# Patient Record
Sex: FEMALE | Race: WHITE | NOT HISPANIC OR LATINO | URBAN - METROPOLITAN AREA
[De-identification: names, ages, dates, MRNs, and addresses within clinical notes are randomized per-mention and may not be internally consistent; named-entity substitution may affect disease eponyms.]

---

## 2015-12-30 RX ORDER — PANTOPRAZOLE SODIUM 20 MG/1
1 TABLET, DELAYED RELEASE ORAL
Qty: 0 | Refills: 0 | COMMUNITY
Start: 2015-12-30

## 2017-05-12 ENCOUNTER — INPATIENT (INPATIENT)
Facility: HOSPITAL | Age: 82
LOS: 10 days | Discharge: NURSING, REHAB OR LONG-TERM CARE | End: 2017-05-23
Attending: INTERNAL MEDICINE | Admitting: INTERNAL MEDICINE
Payer: MEDICARE

## 2017-05-12 VITALS
OXYGEN SATURATION: 100 % | RESPIRATION RATE: 16 BRPM | SYSTOLIC BLOOD PRESSURE: 194 MMHG | HEART RATE: 90 BPM | DIASTOLIC BLOOD PRESSURE: 52 MMHG

## 2017-05-12 DIAGNOSIS — I73.9 PERIPHERAL VASCULAR DISEASE, UNSPECIFIED: ICD-10-CM

## 2017-05-12 DIAGNOSIS — K25.9 GASTRIC ULCER, UNSPECIFIED AS ACUTE OR CHRONIC, WITHOUT HEMORRHAGE OR PERFORATION: ICD-10-CM

## 2017-05-12 DIAGNOSIS — D64.9 ANEMIA, UNSPECIFIED: ICD-10-CM

## 2017-05-12 DIAGNOSIS — Z29.9 ENCOUNTER FOR PROPHYLACTIC MEASURES, UNSPECIFIED: ICD-10-CM

## 2017-05-12 DIAGNOSIS — I10 ESSENTIAL (PRIMARY) HYPERTENSION: ICD-10-CM

## 2017-05-12 DIAGNOSIS — Z98.89 OTHER SPECIFIED POSTPROCEDURAL STATES: Chronic | ICD-10-CM

## 2017-05-12 DIAGNOSIS — G45.9 TRANSIENT CEREBRAL ISCHEMIC ATTACK, UNSPECIFIED: ICD-10-CM

## 2017-05-12 LAB
ALBUMIN SERPL ELPH-MCNC: 4.5 G/DL — SIGNIFICANT CHANGE UP (ref 3.3–5)
ALP SERPL-CCNC: 143 U/L — HIGH (ref 40–120)
ALT FLD-CCNC: 18 U/L — SIGNIFICANT CHANGE UP (ref 4–33)
AST SERPL-CCNC: 23 U/L — SIGNIFICANT CHANGE UP (ref 4–32)
BASE EXCESS BLDV CALC-SCNC: -5.4 MMOL/L — SIGNIFICANT CHANGE UP
BASOPHILS # BLD AUTO: 0.02 K/UL — SIGNIFICANT CHANGE UP (ref 0–0.2)
BASOPHILS NFR BLD AUTO: 0.3 % — SIGNIFICANT CHANGE UP (ref 0–2)
BILIRUB SERPL-MCNC: 0.3 MG/DL — SIGNIFICANT CHANGE UP (ref 0.2–1.2)
BLD GP AB SCN SERPL QL: POSITIVE — SIGNIFICANT CHANGE UP
BLD GP AB SCN SERPL QL: POSITIVE — SIGNIFICANT CHANGE UP
BLOOD GAS VENOUS - CREATININE: 1.31 MG/DL — HIGH (ref 0.5–1.3)
BUN SERPL-MCNC: 27 MG/DL — HIGH (ref 7–23)
CALCIUM SERPL-MCNC: 9.6 MG/DL — SIGNIFICANT CHANGE UP (ref 8.4–10.5)
CHLORIDE BLDV-SCNC: 109 MMOL/L — HIGH (ref 96–108)
CHLORIDE SERPL-SCNC: 103 MMOL/L — SIGNIFICANT CHANGE UP (ref 98–107)
CO2 SERPL-SCNC: 17 MMOL/L — LOW (ref 22–31)
CREAT SERPL-MCNC: 1.38 MG/DL — HIGH (ref 0.5–1.3)
DAT POLY-SP REAG RBC QL: NEGATIVE — SIGNIFICANT CHANGE UP
EOSINOPHIL # BLD AUTO: 0 K/UL — SIGNIFICANT CHANGE UP (ref 0–0.5)
EOSINOPHIL NFR BLD AUTO: 0 % — SIGNIFICANT CHANGE UP (ref 0–6)
FERRITIN SERPL-MCNC: 8.91 NG/ML — LOW (ref 15–150)
GAS PNL BLDV: 136 MMOL/L — SIGNIFICANT CHANGE UP (ref 136–146)
GLUCOSE BLDV-MCNC: 156 — HIGH (ref 70–99)
GLUCOSE SERPL-MCNC: 153 MG/DL — HIGH (ref 70–99)
HAPTOGLOB SERPL-MCNC: 200 MG/DL — SIGNIFICANT CHANGE UP (ref 34–200)
HCO3 BLDV-SCNC: 19 MMOL/L — LOW (ref 20–27)
HCT VFR BLD CALC: 19.5 % — CRITICAL LOW (ref 34.5–45)
HCT VFR BLDV CALC: 17.7 % — CRITICAL LOW (ref 34.5–45)
HGB BLD-MCNC: 5.5 G/DL — CRITICAL LOW (ref 11.5–15.5)
HGB BLDV-MCNC: 5.6 G/DL — CRITICAL LOW (ref 11.5–15.5)
IMM GRANULOCYTES NFR BLD AUTO: 0.3 % — SIGNIFICANT CHANGE UP (ref 0–1.5)
IRON SATN MFR SERPL: 12 UG/DL — LOW (ref 30–160)
IRON SATN MFR SERPL: 534 UG/DL — HIGH (ref 140–530)
LACTATE BLDV-MCNC: 3.6 MMOL/L — HIGH (ref 0.5–2)
LDH SERPL L TO P-CCNC: 227 U/L — HIGH (ref 135–225)
LYMPHOCYTES # BLD AUTO: 0.69 K/UL — LOW (ref 1–3.3)
LYMPHOCYTES # BLD AUTO: 11.4 % — LOW (ref 13–44)
MCHC RBC-ENTMCNC: 23.1 PG — LOW (ref 27–34)
MCHC RBC-ENTMCNC: 28.2 % — LOW (ref 32–36)
MCV RBC AUTO: 81.9 FL — SIGNIFICANT CHANGE UP (ref 80–100)
MONOCYTES # BLD AUTO: 0.19 K/UL — SIGNIFICANT CHANGE UP (ref 0–0.9)
MONOCYTES NFR BLD AUTO: 3.1 % — SIGNIFICANT CHANGE UP (ref 2–14)
NEUTROPHILS # BLD AUTO: 5.15 K/UL — SIGNIFICANT CHANGE UP (ref 1.8–7.4)
NEUTROPHILS NFR BLD AUTO: 84.9 % — HIGH (ref 43–77)
OB PNL STL: NEGATIVE — SIGNIFICANT CHANGE UP
PCO2 BLDV: 39 MMHG — LOW (ref 41–51)
PH BLDV: 7.32 PH — SIGNIFICANT CHANGE UP (ref 7.32–7.43)
PLATELET # BLD AUTO: 307 K/UL — SIGNIFICANT CHANGE UP (ref 150–400)
PMV BLD: 10.1 FL — SIGNIFICANT CHANGE UP (ref 7–13)
PO2 BLDV: < 24 MMHG — LOW (ref 35–40)
POTASSIUM BLDV-SCNC: 4.7 MMOL/L — HIGH (ref 3.4–4.5)
POTASSIUM SERPL-MCNC: 5 MMOL/L — SIGNIFICANT CHANGE UP (ref 3.5–5.3)
POTASSIUM SERPL-SCNC: 5 MMOL/L — SIGNIFICANT CHANGE UP (ref 3.5–5.3)
PROT SERPL-MCNC: 7.7 G/DL — SIGNIFICANT CHANGE UP (ref 6–8.3)
RBC # BLD: 2.38 M/UL — LOW (ref 3.8–5.2)
RBC # FLD: 18.8 % — HIGH (ref 10.3–14.5)
RETICS #: 71.9 10X3/UL — SIGNIFICANT CHANGE UP (ref 17–73)
RETICS/RBC NFR: 3 % — HIGH (ref 0.5–2.5)
RH IG SCN BLD-IMP: POSITIVE — SIGNIFICANT CHANGE UP
RH IG SCN BLD-IMP: POSITIVE — SIGNIFICANT CHANGE UP
SAO2 % BLDV: 21.7 % — LOW (ref 60–85)
SODIUM SERPL-SCNC: 140 MMOL/L — SIGNIFICANT CHANGE UP (ref 135–145)
UIBC SERPL-MCNC: 522 UG/DL — HIGH (ref 110–370)
WBC # BLD: 6.07 K/UL — SIGNIFICANT CHANGE UP (ref 3.8–10.5)
WBC # FLD AUTO: 6.07 K/UL — SIGNIFICANT CHANGE UP (ref 3.8–10.5)

## 2017-05-12 PROCEDURE — 74176 CT ABD & PELVIS W/O CONTRAST: CPT | Mod: 26

## 2017-05-12 PROCEDURE — 86077 PHYS BLOOD BANK SERV XMATCH: CPT

## 2017-05-12 RX ORDER — ATORVASTATIN CALCIUM 80 MG/1
80 TABLET, FILM COATED ORAL AT BEDTIME
Qty: 0 | Refills: 0 | Status: DISCONTINUED | OUTPATIENT
Start: 2017-05-12 | End: 2017-05-23

## 2017-05-12 RX ORDER — ISOSORBIDE MONONITRATE 60 MG/1
60 TABLET, EXTENDED RELEASE ORAL DAILY
Qty: 0 | Refills: 0 | Status: DISCONTINUED | OUTPATIENT
Start: 2017-05-12 | End: 2017-05-23

## 2017-05-12 RX ORDER — PANTOPRAZOLE SODIUM 20 MG/1
40 TABLET, DELAYED RELEASE ORAL EVERY 12 HOURS
Qty: 0 | Refills: 0 | Status: DISCONTINUED | OUTPATIENT
Start: 2017-05-12 | End: 2017-05-18

## 2017-05-12 RX ORDER — GABAPENTIN 400 MG/1
300 CAPSULE ORAL DAILY
Qty: 0 | Refills: 0 | Status: DISCONTINUED | OUTPATIENT
Start: 2017-05-12 | End: 2017-05-23

## 2017-05-12 RX ORDER — DOXAZOSIN MESYLATE 4 MG
1 TABLET ORAL AT BEDTIME
Qty: 0 | Refills: 0 | Status: DISCONTINUED | OUTPATIENT
Start: 2017-05-12 | End: 2017-05-23

## 2017-05-12 RX ADMIN — ATORVASTATIN CALCIUM 80 MILLIGRAM(S): 80 TABLET, FILM COATED ORAL at 22:26

## 2017-05-12 RX ADMIN — Medication 1 MILLIGRAM(S): at 22:47

## 2017-05-12 NOTE — H&P ADULT. - PROBLEM SELECTOR PLAN 3
-hypertensive in ED; will restart home Cardura 1mg, Imdur 60mg  -holding Lasix 40mg and Losartan in setting of slight CELI

## 2017-05-12 NOTE — ED PROVIDER NOTE - OBJECTIVE STATEMENT
85F PMH HTN, PVD, arthritis, GIB in the past, gastric ulcer sent from urgent care for low H/H. Pt reports seen at urgent care yesterday for b/l LE pain (chronic, currently pain free and ambulatory, worked up and determined to be musculoskeletal in origin). Had bloodwork done and today received a phone call saying to go to the hospital for low hemoglobin. Unsure what value was. Denies dark stools but endorses hx of maroon stools in the past with GI bleed. No abd pain, NVD, fever/chills. Endorses fatigue, but no chest pain or SOB, no WELCH.     GI: Dr. Shelton Connolly

## 2017-05-12 NOTE — H&P ADULT. - NEGATIVE GASTROINTESTINAL SYMPTOMS
no hematochezia/+melena 2 weeks prior to admission/no vomiting/no jaundice/no diarrhea/no constipation/no nausea/no abdominal pain

## 2017-05-12 NOTE — H&P ADULT. - RS GEN PE MLT RESP DETAILS PC
no wheezes/airway patent/no intercostal retractions/good air movement/clear to auscultation bilaterally/no rhonchi/no chest wall tenderness/no rales/breath sounds equal/respirations non-labored/normal

## 2017-05-12 NOTE — PATIENT PROFILE ADULT. - NS TRANSFER PATIENT BELONGINGS
Jewelry/Money (specify)/wedding ring Clothing/cosmetic supplies/Other belongings/Cell Phone/PDA (specify)

## 2017-05-12 NOTE — ED PROVIDER NOTE - PSH
History of operative procedure on hip  Right hip fracture s/p fall on 03/16.  S/P tonsillectomy    Varicose veins  stripping

## 2017-05-12 NOTE — H&P ADULT. - ATTENDING COMMENTS
Chart reviewed. Vitals and Labs noted.   Pt seen and examined at bedside. Plan formulated with the resident/PA/NP. Detail H&P as above.   hx of gastric ulcers, PUD, hx of CVA/CAD on Asa/Plavix. here for 3 days of melena.   Hgb of 5.5. Iron deficient as well likely from chronic loss.   S/p 2 unit pRBC transfusion with appropriate Hgb response. Pt feels well.  Will supplement iron via IV.   GI consult. c/w PPI IV BID, clear liquids diet.  Already have recent NM stress test and TTE in March 2017. All normal other than mild LVH. Hold Asa/Plavix for now.   Await GI eval Chart reviewed. Vitals and Labs noted.   Pt seen and examined at bedside. Plan formulated with the resident/PA/NP. Detail H&P as above.   hx of gastric ulcers, PUD, hx of CVA/CAD on Asa/Plavix. here for 3 days of melena.   Hgb of 5.5. Iron deficient as well likely from chronic loss.   S/p 2 unit pRBC transfusion with appropriate Hgb response. Pt feels well.  Will supplement iron via IV. Recheck Vitamin B12. Was low at 200 back in 2015.   GI consult. c/w PPI IV BID, clear liquids diet.  Already have recent NM stress test and TTE in March 2017. All normal other than mild LVH. Hold Asa/Plavix for now.   Await GI eval

## 2017-05-12 NOTE — ED ADULT NURSE NOTE - CHIEF COMPLAINT QUOTE
Pt sent from [facility]  with low H/H.  Pt seen for leg pain.  Denies chest pain, sob, dizziness

## 2017-05-12 NOTE — H&P ADULT. - NEGATIVE ENMT SYMPTOMS
no ear pain/no nasal congestion/no nose bleeds/no dysphagia/no throat pain/no nasal obstruction/no tinnitus/no hearing difficulty/no vertigo

## 2017-05-12 NOTE — H&P ADULT. - NEGATIVE NEUROLOGICAL SYMPTOMS
no transient paralysis/no vertigo/no paresthesias/no generalized seizures/no focal seizures/no tremors/no weakness

## 2017-05-12 NOTE — ED PROVIDER NOTE - PROGRESS NOTE DETAILS
Hemoglobin 5.5. Consented for blood. 2U PRBC ordered. Will admit given severity of anemia and lack of source. Pt's PMD is Dr. Rasmussen ([facility]); [facility] hospitalist paged x 1. Accepted for admission to [facility]. MAR textpage sent. Pt and family aware and amenable to plan.

## 2017-05-12 NOTE — ED ADULT TRIAGE NOTE - CHIEF COMPLAINT QUOTE
Pt sent from [facility]  with low H/H.  Pt seen for leg pain.  Denies chest pain, sob, dizziness

## 2017-05-12 NOTE — ED PROVIDER NOTE - MEDICAL DECISION MAKING DETAILS
85F with anemia of unclear origin. Denies any acute obvious source of bleeding. Assess cbc, cmp, type and screen, coags, occult blood. Transfuse if indiciated (Hb <7) 85F with anemia of unclear origin. Denies any acute obvious source of bleeding. Assess cbc, cmp, type and screen, coags, occult blood. Transfuse if indiciated (Hb <7)  Tamiko: sent for low blood count had been in outpt care for chronic LE pain.  Now with brown stool now.

## 2017-05-12 NOTE — H&P ADULT. - ASSESSMENT
86 y/o F PMHx PUD c/b GIB 12/2015 -last EGD 1/2016, PVD, CVA (no deficits) on DAPT, arthritis, HTN presents from home after receiving call from Urgent Care facility for asymptomatic anemia Hb 5.5, receiving 2u pRBC in ED, hemodynamically stable at this time, will transfer to medicine floors for continued management and evaluation.

## 2017-05-12 NOTE — ED ADULT NURSE NOTE - OBJECTIVE STATEMENT
Patient received into room 11 AA&Ox3 sent from PMD for abnormal lab value - low h/h. VSS on RA. Patient denies chest pain, N/V, SOB, fever, chills, dizziness/light-headedness at this time. Patient was seen at urgent care yesterday for chronic pain to b/l LE - currently no c/o pain. 20g PIV in place to left AC, labs drawn - will continue to monitor.

## 2017-05-12 NOTE — ED ADULT NURSE REASSESSMENT NOTE - NS ED NURSE REASSESS COMMENT FT1
Patient in NAD. VSS on RA. Patient denies chest pain, N/V, SOB, weakness at this time - will continue to monitor.

## 2017-05-12 NOTE — H&P ADULT. - PROBLEM SELECTOR PLAN 2
-history of PUD c/b GIB, last EGD 1/2016 showing ulcer  -will start Protonix 40mg BID  -Clear Liquid Diet  -GI consulted, likely EGD/Colonoscopy Monday 5/15, formal evaluation to follow

## 2017-05-12 NOTE — H&P ADULT. - NEGATIVE CARDIOVASCULAR SYMPTOMS
no chest pain/no palpitations/no dyspnea on exertion/no paroxysmal nocturnal dyspnea/no orthopnea/no peripheral edema

## 2017-05-12 NOTE — ED PROVIDER NOTE - GASTROINTESTINAL NEGATIVE STATEMENT, MLM
no abdominal pain, no bloating, no constipation, no diarrhea, no nausea and no vomiting. No dark stools

## 2017-05-12 NOTE — ED ADULT NURSE NOTE - PMH
Acute stomach ulcer    Gastric ulcer, unspecified as acute or chronic, without hemorrhage or perforation    GIB (gastrointestinal bleeding)    HTN (hypertension)    PVD (peripheral vascular disease)    TIA (transient ischemic attack)

## 2017-05-12 NOTE — H&P ADULT. - PROBLEM SELECTOR PLAN 1
-Hb 5.5 in ED, unclear etiology, but given melena 2 weeks prior to admission and fatigue since, likely source chronic GIB  -GI consulted  -Will transfuse 2u pRBC now, check CBC post-transfusion and q8 hours, will transfuse to goal Hb 7.0  -Iron studies ordered  -IV PPI BID  -CLD

## 2017-05-12 NOTE — ED PROVIDER NOTE - ATTENDING CONTRIBUTION TO CARE
I performed a history and physical exam of the patient and discussed their management with the resident. I reviewed the resident's note and agree with the documented findings and plan of care. My medical decision making and observations are found above.  And soft, brown stool.

## 2017-05-13 LAB
ALBUMIN SERPL ELPH-MCNC: 4 G/DL — SIGNIFICANT CHANGE UP (ref 3.3–5)
ALP SERPL-CCNC: 119 U/L — SIGNIFICANT CHANGE UP (ref 40–120)
ALT FLD-CCNC: 17 U/L — SIGNIFICANT CHANGE UP (ref 4–33)
ANTIBODY ID 1_1: SIGNIFICANT CHANGE UP
APTT BLD: 26.4 SEC — LOW (ref 27.5–37.4)
AST SERPL-CCNC: 20 U/L — SIGNIFICANT CHANGE UP (ref 4–32)
BASOPHILS # BLD AUTO: 0.03 K/UL — SIGNIFICANT CHANGE UP (ref 0–0.2)
BASOPHILS NFR BLD AUTO: 0.5 % — SIGNIFICANT CHANGE UP (ref 0–2)
BILIRUB SERPL-MCNC: 0.6 MG/DL — SIGNIFICANT CHANGE UP (ref 0.2–1.2)
BUN SERPL-MCNC: 26 MG/DL — HIGH (ref 7–23)
CALCIUM SERPL-MCNC: 9.5 MG/DL — SIGNIFICANT CHANGE UP (ref 8.4–10.5)
CHLORIDE SERPL-SCNC: 105 MMOL/L — SIGNIFICANT CHANGE UP (ref 98–107)
CO2 SERPL-SCNC: 20 MMOL/L — LOW (ref 22–31)
CREAT SERPL-MCNC: 1.18 MG/DL — SIGNIFICANT CHANGE UP (ref 0.5–1.3)
EOSINOPHIL # BLD AUTO: 0.02 K/UL — SIGNIFICANT CHANGE UP (ref 0–0.5)
EOSINOPHIL NFR BLD AUTO: 0.3 % — SIGNIFICANT CHANGE UP (ref 0–6)
GLUCOSE SERPL-MCNC: 103 MG/DL — HIGH (ref 70–99)
HCT VFR BLD CALC: 24.9 % — LOW (ref 34.5–45)
HCT VFR BLD CALC: 25.7 % — LOW (ref 34.5–45)
HCT VFR BLD CALC: 25.9 % — LOW (ref 34.5–45)
HGB BLD-MCNC: 7.5 G/DL — LOW (ref 11.5–15.5)
HGB BLD-MCNC: 7.8 G/DL — LOW (ref 11.5–15.5)
HGB BLD-MCNC: 8.1 G/DL — LOW (ref 11.5–15.5)
IMM GRANULOCYTES NFR BLD AUTO: 0.2 % — SIGNIFICANT CHANGE UP (ref 0–1.5)
INR BLD: 1.05 — SIGNIFICANT CHANGE UP (ref 0.88–1.17)
LACTATE SERPL-SCNC: 2.9 MMOL/L — HIGH (ref 0.5–2)
LYMPHOCYTES # BLD AUTO: 0.99 K/UL — LOW (ref 1–3.3)
LYMPHOCYTES # BLD AUTO: 16.3 % — SIGNIFICANT CHANGE UP (ref 13–44)
MAGNESIUM SERPL-MCNC: 2.3 MG/DL — SIGNIFICANT CHANGE UP (ref 1.6–2.6)
MCHC RBC-ENTMCNC: 24.6 PG — LOW (ref 27–34)
MCHC RBC-ENTMCNC: 24.8 PG — LOW (ref 27–34)
MCHC RBC-ENTMCNC: 25.3 PG — LOW (ref 27–34)
MCHC RBC-ENTMCNC: 30.1 % — LOW (ref 32–36)
MCHC RBC-ENTMCNC: 30.4 % — LOW (ref 32–36)
MCHC RBC-ENTMCNC: 31.3 % — LOW (ref 32–36)
MCV RBC AUTO: 80.9 FL — SIGNIFICANT CHANGE UP (ref 80–100)
MCV RBC AUTO: 81.6 FL — SIGNIFICANT CHANGE UP (ref 80–100)
MCV RBC AUTO: 81.8 FL — SIGNIFICANT CHANGE UP (ref 80–100)
MONOCYTES # BLD AUTO: 0.47 K/UL — SIGNIFICANT CHANGE UP (ref 0–0.9)
MONOCYTES NFR BLD AUTO: 7.7 % — SIGNIFICANT CHANGE UP (ref 2–14)
NEUTROPHILS # BLD AUTO: 4.55 K/UL — SIGNIFICANT CHANGE UP (ref 1.8–7.4)
NEUTROPHILS NFR BLD AUTO: 75 % — SIGNIFICANT CHANGE UP (ref 43–77)
PHOSPHATE SERPL-MCNC: 2.7 MG/DL — SIGNIFICANT CHANGE UP (ref 2.5–4.5)
PLATELET # BLD AUTO: 242 K/UL — SIGNIFICANT CHANGE UP (ref 150–400)
PLATELET # BLD AUTO: 248 K/UL — SIGNIFICANT CHANGE UP (ref 150–400)
PLATELET # BLD AUTO: 252 K/UL — SIGNIFICANT CHANGE UP (ref 150–400)
PMV BLD: 9.5 FL — SIGNIFICANT CHANGE UP (ref 7–13)
PMV BLD: 9.7 FL — SIGNIFICANT CHANGE UP (ref 7–13)
PMV BLD: 9.9 FL — SIGNIFICANT CHANGE UP (ref 7–13)
POTASSIUM SERPL-MCNC: 4.1 MMOL/L — SIGNIFICANT CHANGE UP (ref 3.5–5.3)
POTASSIUM SERPL-SCNC: 4.1 MMOL/L — SIGNIFICANT CHANGE UP (ref 3.5–5.3)
PROT SERPL-MCNC: 7 G/DL — SIGNIFICANT CHANGE UP (ref 6–8.3)
PROTHROM AB SERPL-ACNC: 11.8 SEC — SIGNIFICANT CHANGE UP (ref 9.8–13.1)
RBC # BLD: 3.05 M/UL — LOW (ref 3.8–5.2)
RBC # BLD: 3.14 M/UL — LOW (ref 3.8–5.2)
RBC # BLD: 3.2 M/UL — LOW (ref 3.8–5.2)
RBC # FLD: 16.7 % — HIGH (ref 10.3–14.5)
RBC # FLD: 16.8 % — HIGH (ref 10.3–14.5)
RBC # FLD: 17 % — HIGH (ref 10.3–14.5)
SODIUM SERPL-SCNC: 142 MMOL/L — SIGNIFICANT CHANGE UP (ref 135–145)
WBC # BLD: 6.07 K/UL — SIGNIFICANT CHANGE UP (ref 3.8–10.5)
WBC # BLD: 6.82 K/UL — SIGNIFICANT CHANGE UP (ref 3.8–10.5)
WBC # BLD: 8.46 K/UL — SIGNIFICANT CHANGE UP (ref 3.8–10.5)
WBC # FLD AUTO: 6.07 K/UL — SIGNIFICANT CHANGE UP (ref 3.8–10.5)
WBC # FLD AUTO: 6.82 K/UL — SIGNIFICANT CHANGE UP (ref 3.8–10.5)
WBC # FLD AUTO: 8.46 K/UL — SIGNIFICANT CHANGE UP (ref 3.8–10.5)

## 2017-05-13 RX ORDER — LABETALOL HCL 100 MG
10 TABLET ORAL ONCE
Qty: 0 | Refills: 0 | Status: COMPLETED | OUTPATIENT
Start: 2017-05-13 | End: 2017-05-13

## 2017-05-13 RX ORDER — PREGABALIN 225 MG/1
1000 CAPSULE ORAL DAILY
Qty: 0 | Refills: 0 | Status: DISCONTINUED | OUTPATIENT
Start: 2017-05-13 | End: 2017-05-16

## 2017-05-13 RX ORDER — LOSARTAN POTASSIUM 100 MG/1
100 TABLET, FILM COATED ORAL DAILY
Qty: 0 | Refills: 0 | Status: DISCONTINUED | OUTPATIENT
Start: 2017-05-13 | End: 2017-05-23

## 2017-05-13 RX ORDER — FUROSEMIDE 40 MG
40 TABLET ORAL DAILY
Qty: 0 | Refills: 0 | Status: DISCONTINUED | OUTPATIENT
Start: 2017-05-13 | End: 2017-05-13

## 2017-05-13 RX ORDER — IRON SUCROSE 20 MG/ML
200 INJECTION, SOLUTION INTRAVENOUS ONCE
Qty: 0 | Refills: 0 | Status: COMPLETED | OUTPATIENT
Start: 2017-05-13 | End: 2017-05-13

## 2017-05-13 RX ORDER — FUROSEMIDE 40 MG
40 TABLET ORAL DAILY
Qty: 0 | Refills: 0 | Status: DISCONTINUED | OUTPATIENT
Start: 2017-05-13 | End: 2017-05-23

## 2017-05-13 RX ADMIN — IRON SUCROSE 110 MILLIGRAM(S): 20 INJECTION, SOLUTION INTRAVENOUS at 16:22

## 2017-05-13 RX ADMIN — Medication 10 MILLIGRAM(S): at 06:49

## 2017-05-13 RX ADMIN — Medication 1 MILLIGRAM(S): at 21:32

## 2017-05-13 RX ADMIN — PANTOPRAZOLE SODIUM 40 MILLIGRAM(S): 20 TABLET, DELAYED RELEASE ORAL at 05:06

## 2017-05-13 RX ADMIN — ISOSORBIDE MONONITRATE 60 MILLIGRAM(S): 60 TABLET, EXTENDED RELEASE ORAL at 13:22

## 2017-05-13 RX ADMIN — GABAPENTIN 300 MILLIGRAM(S): 400 CAPSULE ORAL at 13:22

## 2017-05-13 RX ADMIN — PANTOPRAZOLE SODIUM 40 MILLIGRAM(S): 20 TABLET, DELAYED RELEASE ORAL at 17:34

## 2017-05-13 RX ADMIN — PREGABALIN 1000 MICROGRAM(S): 225 CAPSULE ORAL at 13:22

## 2017-05-13 RX ADMIN — ATORVASTATIN CALCIUM 80 MILLIGRAM(S): 80 TABLET, FILM COATED ORAL at 21:32

## 2017-05-13 RX ADMIN — LOSARTAN POTASSIUM 100 MILLIGRAM(S): 100 TABLET, FILM COATED ORAL at 13:22

## 2017-05-13 RX ADMIN — Medication 40 MILLIGRAM(S): at 19:07

## 2017-05-14 LAB
BLD GP AB SCN SERPL QL: POSITIVE — SIGNIFICANT CHANGE UP
BUN SERPL-MCNC: 22 MG/DL — SIGNIFICANT CHANGE UP (ref 7–23)
CALCIUM SERPL-MCNC: 9.3 MG/DL — SIGNIFICANT CHANGE UP (ref 8.4–10.5)
CHLORIDE SERPL-SCNC: 103 MMOL/L — SIGNIFICANT CHANGE UP (ref 98–107)
CO2 SERPL-SCNC: 21 MMOL/L — LOW (ref 22–31)
CREAT SERPL-MCNC: 1.18 MG/DL — SIGNIFICANT CHANGE UP (ref 0.5–1.3)
DAT C3-SP REAG RBC QL: NEGATIVE — SIGNIFICANT CHANGE UP
DAT POLY-SP REAG RBC QL: NEGATIVE — SIGNIFICANT CHANGE UP
DIRECT COOMBS IGG: NEGATIVE — SIGNIFICANT CHANGE UP
GLUCOSE SERPL-MCNC: 137 MG/DL — HIGH (ref 70–99)
HCT VFR BLD CALC: 28.3 % — LOW (ref 34.5–45)
HGB BLD-MCNC: 8.7 G/DL — LOW (ref 11.5–15.5)
LACTATE SERPL-SCNC: 1.9 MMOL/L — SIGNIFICANT CHANGE UP (ref 0.5–2)
MAGNESIUM SERPL-MCNC: 2.2 MG/DL — SIGNIFICANT CHANGE UP (ref 1.6–2.6)
MCHC RBC-ENTMCNC: 25.3 PG — LOW (ref 27–34)
MCHC RBC-ENTMCNC: 30.7 % — LOW (ref 32–36)
MCV RBC AUTO: 82.3 FL — SIGNIFICANT CHANGE UP (ref 80–100)
NRBC FLD-RTO: 1.5 — SIGNIFICANT CHANGE UP
PHOSPHATE SERPL-MCNC: 4 MG/DL — SIGNIFICANT CHANGE UP (ref 2.5–4.5)
PLATELET # BLD AUTO: 274 K/UL — SIGNIFICANT CHANGE UP (ref 150–400)
PMV BLD: 10.4 FL — SIGNIFICANT CHANGE UP (ref 7–13)
POTASSIUM SERPL-MCNC: 4.3 MMOL/L — SIGNIFICANT CHANGE UP (ref 3.5–5.3)
POTASSIUM SERPL-SCNC: 4.3 MMOL/L — SIGNIFICANT CHANGE UP (ref 3.5–5.3)
RBC # BLD: 3.44 M/UL — LOW (ref 3.8–5.2)
RBC # FLD: 17.4 % — HIGH (ref 10.3–14.5)
RH IG SCN BLD-IMP: POSITIVE — SIGNIFICANT CHANGE UP
SODIUM SERPL-SCNC: 142 MMOL/L — SIGNIFICANT CHANGE UP (ref 135–145)
VIT B12 SERPL-MCNC: 447 PG/ML — SIGNIFICANT CHANGE UP (ref 200–900)
WBC # BLD: 4.93 K/UL — SIGNIFICANT CHANGE UP (ref 3.8–10.5)
WBC # FLD AUTO: 4.93 K/UL — SIGNIFICANT CHANGE UP (ref 3.8–10.5)

## 2017-05-14 RX ORDER — SOD SULF/SODIUM/NAHCO3/KCL/PEG
1000 SOLUTION, RECONSTITUTED, ORAL ORAL EVERY 6 HOURS
Qty: 0 | Refills: 0 | Status: COMPLETED | OUTPATIENT
Start: 2017-05-14 | End: 2017-05-14

## 2017-05-14 RX ADMIN — Medication 1000 MILLILITER(S): at 17:53

## 2017-05-14 RX ADMIN — Medication 1 MILLIGRAM(S): at 21:13

## 2017-05-14 RX ADMIN — GABAPENTIN 300 MILLIGRAM(S): 400 CAPSULE ORAL at 11:30

## 2017-05-14 RX ADMIN — LOSARTAN POTASSIUM 100 MILLIGRAM(S): 100 TABLET, FILM COATED ORAL at 05:12

## 2017-05-14 RX ADMIN — Medication 40 MILLIGRAM(S): at 05:12

## 2017-05-14 RX ADMIN — PANTOPRAZOLE SODIUM 40 MILLIGRAM(S): 20 TABLET, DELAYED RELEASE ORAL at 05:12

## 2017-05-14 RX ADMIN — Medication 5 MILLIGRAM(S): at 17:54

## 2017-05-14 RX ADMIN — Medication 5 MILLIGRAM(S): at 23:03

## 2017-05-14 RX ADMIN — PREGABALIN 1000 MICROGRAM(S): 225 CAPSULE ORAL at 11:30

## 2017-05-14 RX ADMIN — ISOSORBIDE MONONITRATE 60 MILLIGRAM(S): 60 TABLET, EXTENDED RELEASE ORAL at 11:31

## 2017-05-14 RX ADMIN — Medication 1000 MILLILITER(S): at 23:03

## 2017-05-14 RX ADMIN — ATORVASTATIN CALCIUM 80 MILLIGRAM(S): 80 TABLET, FILM COATED ORAL at 21:13

## 2017-05-14 RX ADMIN — PANTOPRAZOLE SODIUM 40 MILLIGRAM(S): 20 TABLET, DELAYED RELEASE ORAL at 17:49

## 2017-05-15 LAB
BUN SERPL-MCNC: 21 MG/DL — SIGNIFICANT CHANGE UP (ref 7–23)
CALCIUM SERPL-MCNC: 9 MG/DL — SIGNIFICANT CHANGE UP (ref 8.4–10.5)
CHLORIDE SERPL-SCNC: 106 MMOL/L — SIGNIFICANT CHANGE UP (ref 98–107)
CO2 SERPL-SCNC: 18 MMOL/L — LOW (ref 22–31)
CREAT SERPL-MCNC: 1.23 MG/DL — SIGNIFICANT CHANGE UP (ref 0.5–1.3)
GLUCOSE SERPL-MCNC: 80 MG/DL — SIGNIFICANT CHANGE UP (ref 70–99)
HCT VFR BLD CALC: 26.1 % — LOW (ref 34.5–45)
HGB BLD-MCNC: 7.7 G/DL — LOW (ref 11.5–15.5)
MAGNESIUM SERPL-MCNC: 2 MG/DL — SIGNIFICANT CHANGE UP (ref 1.6–2.6)
MCHC RBC-ENTMCNC: 24.4 PG — LOW (ref 27–34)
MCHC RBC-ENTMCNC: 29.5 % — LOW (ref 32–36)
MCV RBC AUTO: 82.9 FL — SIGNIFICANT CHANGE UP (ref 80–100)
NRBC FLD-RTO: 1 — SIGNIFICANT CHANGE UP
PHOSPHATE SERPL-MCNC: 3.5 MG/DL — SIGNIFICANT CHANGE UP (ref 2.5–4.5)
PLATELET # BLD AUTO: 239 K/UL — SIGNIFICANT CHANGE UP (ref 150–400)
PMV BLD: 9.7 FL — SIGNIFICANT CHANGE UP (ref 7–13)
POTASSIUM SERPL-MCNC: 4 MMOL/L — SIGNIFICANT CHANGE UP (ref 3.5–5.3)
POTASSIUM SERPL-SCNC: 4 MMOL/L — SIGNIFICANT CHANGE UP (ref 3.5–5.3)
RBC # BLD: 3.15 M/UL — LOW (ref 3.8–5.2)
RBC # FLD: 17.6 % — HIGH (ref 10.3–14.5)
SODIUM SERPL-SCNC: 143 MMOL/L — SIGNIFICANT CHANGE UP (ref 135–145)
WBC # BLD: 4.76 K/UL — SIGNIFICANT CHANGE UP (ref 3.8–10.5)
WBC # FLD AUTO: 4.76 K/UL — SIGNIFICANT CHANGE UP (ref 3.8–10.5)

## 2017-05-15 PROCEDURE — 93306 TTE W/DOPPLER COMPLETE: CPT | Mod: 26

## 2017-05-15 PROCEDURE — 43235 EGD DIAGNOSTIC BRUSH WASH: CPT | Mod: GC

## 2017-05-15 PROCEDURE — 99232 SBSQ HOSP IP/OBS MODERATE 35: CPT | Mod: 25,GC

## 2017-05-15 PROCEDURE — 45378 DIAGNOSTIC COLONOSCOPY: CPT | Mod: GC

## 2017-05-15 RX ORDER — IRON SUCROSE 20 MG/ML
200 INJECTION, SOLUTION INTRAVENOUS ONCE
Qty: 0 | Refills: 0 | Status: COMPLETED | OUTPATIENT
Start: 2017-05-15 | End: 2017-05-15

## 2017-05-15 RX ADMIN — GABAPENTIN 300 MILLIGRAM(S): 400 CAPSULE ORAL at 12:03

## 2017-05-15 RX ADMIN — Medication 1 MILLIGRAM(S): at 21:05

## 2017-05-15 RX ADMIN — PANTOPRAZOLE SODIUM 40 MILLIGRAM(S): 20 TABLET, DELAYED RELEASE ORAL at 05:15

## 2017-05-15 RX ADMIN — LOSARTAN POTASSIUM 100 MILLIGRAM(S): 100 TABLET, FILM COATED ORAL at 05:15

## 2017-05-15 RX ADMIN — Medication 40 MILLIGRAM(S): at 05:15

## 2017-05-15 RX ADMIN — ISOSORBIDE MONONITRATE 60 MILLIGRAM(S): 60 TABLET, EXTENDED RELEASE ORAL at 12:02

## 2017-05-15 RX ADMIN — PREGABALIN 1000 MICROGRAM(S): 225 CAPSULE ORAL at 12:03

## 2017-05-15 RX ADMIN — IRON SUCROSE 110 MILLIGRAM(S): 20 INJECTION, SOLUTION INTRAVENOUS at 21:06

## 2017-05-15 RX ADMIN — ATORVASTATIN CALCIUM 80 MILLIGRAM(S): 80 TABLET, FILM COATED ORAL at 21:05

## 2017-05-16 LAB
BUN SERPL-MCNC: 21 MG/DL — SIGNIFICANT CHANGE UP (ref 7–23)
CALCIUM SERPL-MCNC: 9 MG/DL — SIGNIFICANT CHANGE UP (ref 8.4–10.5)
CHLORIDE SERPL-SCNC: 109 MMOL/L — HIGH (ref 98–107)
CO2 SERPL-SCNC: 21 MMOL/L — LOW (ref 22–31)
CREAT SERPL-MCNC: 1.1 MG/DL — SIGNIFICANT CHANGE UP (ref 0.5–1.3)
GLUCOSE SERPL-MCNC: 107 MG/DL — HIGH (ref 70–99)
HCT VFR BLD CALC: 26.8 % — LOW (ref 34.5–45)
HGB BLD-MCNC: 7.9 G/DL — LOW (ref 11.5–15.5)
MAGNESIUM SERPL-MCNC: 2 MG/DL — SIGNIFICANT CHANGE UP (ref 1.6–2.6)
MCHC RBC-ENTMCNC: 24.7 PG — LOW (ref 27–34)
MCHC RBC-ENTMCNC: 29.5 % — LOW (ref 32–36)
MCV RBC AUTO: 83.8 FL — SIGNIFICANT CHANGE UP (ref 80–100)
PHOSPHATE SERPL-MCNC: 3 MG/DL — SIGNIFICANT CHANGE UP (ref 2.5–4.5)
PLATELET # BLD AUTO: 258 K/UL — SIGNIFICANT CHANGE UP (ref 150–400)
PMV BLD: 10 FL — SIGNIFICANT CHANGE UP (ref 7–13)
POTASSIUM SERPL-MCNC: 3.8 MMOL/L — SIGNIFICANT CHANGE UP (ref 3.5–5.3)
POTASSIUM SERPL-SCNC: 3.8 MMOL/L — SIGNIFICANT CHANGE UP (ref 3.5–5.3)
RBC # BLD: 3.2 M/UL — LOW (ref 3.8–5.2)
RBC # FLD: 17.7 % — HIGH (ref 10.3–14.5)
SODIUM SERPL-SCNC: 146 MMOL/L — HIGH (ref 135–145)
WBC # BLD: 8.28 K/UL — SIGNIFICANT CHANGE UP (ref 3.8–10.5)
WBC # FLD AUTO: 8.28 K/UL — SIGNIFICANT CHANGE UP (ref 3.8–10.5)

## 2017-05-16 PROCEDURE — 93925 LOWER EXTREMITY STUDY: CPT | Mod: 26

## 2017-05-16 PROCEDURE — 99223 1ST HOSP IP/OBS HIGH 75: CPT

## 2017-05-16 PROCEDURE — 93923 UPR/LXTR ART STDY 3+ LVLS: CPT | Mod: 26

## 2017-05-16 RX ORDER — IBUPROFEN 200 MG
400 TABLET ORAL ONCE
Qty: 0 | Refills: 0 | Status: COMPLETED | OUTPATIENT
Start: 2017-05-16 | End: 2017-05-16

## 2017-05-16 RX ORDER — ACETAMINOPHEN 500 MG
650 TABLET ORAL ONCE
Qty: 0 | Refills: 0 | Status: COMPLETED | OUTPATIENT
Start: 2017-05-16 | End: 2017-05-16

## 2017-05-16 RX ORDER — ACETAMINOPHEN 500 MG
650 TABLET ORAL EVERY 6 HOURS
Qty: 0 | Refills: 0 | Status: DISCONTINUED | OUTPATIENT
Start: 2017-05-16 | End: 2017-05-23

## 2017-05-16 RX ADMIN — GABAPENTIN 300 MILLIGRAM(S): 400 CAPSULE ORAL at 13:24

## 2017-05-16 RX ADMIN — Medication 650 MILLIGRAM(S): at 19:20

## 2017-05-16 RX ADMIN — Medication 400 MILLIGRAM(S): at 19:20

## 2017-05-16 RX ADMIN — Medication 40 MILLIGRAM(S): at 06:07

## 2017-05-16 RX ADMIN — PANTOPRAZOLE SODIUM 40 MILLIGRAM(S): 20 TABLET, DELAYED RELEASE ORAL at 06:07

## 2017-05-16 RX ADMIN — ATORVASTATIN CALCIUM 80 MILLIGRAM(S): 80 TABLET, FILM COATED ORAL at 21:44

## 2017-05-16 RX ADMIN — Medication 650 MILLIGRAM(S): at 18:22

## 2017-05-16 RX ADMIN — ISOSORBIDE MONONITRATE 60 MILLIGRAM(S): 60 TABLET, EXTENDED RELEASE ORAL at 13:25

## 2017-05-16 RX ADMIN — LOSARTAN POTASSIUM 100 MILLIGRAM(S): 100 TABLET, FILM COATED ORAL at 06:07

## 2017-05-16 RX ADMIN — Medication 400 MILLIGRAM(S): at 18:21

## 2017-05-16 RX ADMIN — Medication 1 MILLIGRAM(S): at 21:43

## 2017-05-16 RX ADMIN — PANTOPRAZOLE SODIUM 40 MILLIGRAM(S): 20 TABLET, DELAYED RELEASE ORAL at 18:22

## 2017-05-17 LAB
BUN SERPL-MCNC: 22 MG/DL — SIGNIFICANT CHANGE UP (ref 7–23)
CALCIUM SERPL-MCNC: 9.1 MG/DL — SIGNIFICANT CHANGE UP (ref 8.4–10.5)
CHLORIDE SERPL-SCNC: 107 MMOL/L — SIGNIFICANT CHANGE UP (ref 98–107)
CO2 SERPL-SCNC: 21 MMOL/L — LOW (ref 22–31)
CREAT SERPL-MCNC: 1.22 MG/DL — SIGNIFICANT CHANGE UP (ref 0.5–1.3)
GLUCOSE SERPL-MCNC: 105 MG/DL — HIGH (ref 70–99)
HCT VFR BLD CALC: 28.4 % — LOW (ref 34.5–45)
HGB BLD-MCNC: 8.5 G/DL — LOW (ref 11.5–15.5)
MAGNESIUM SERPL-MCNC: 1.9 MG/DL — SIGNIFICANT CHANGE UP (ref 1.6–2.6)
MCHC RBC-ENTMCNC: 25.1 PG — LOW (ref 27–34)
MCHC RBC-ENTMCNC: 29.9 % — LOW (ref 32–36)
MCV RBC AUTO: 84 FL — SIGNIFICANT CHANGE UP (ref 80–100)
PHOSPHATE SERPL-MCNC: 3.7 MG/DL — SIGNIFICANT CHANGE UP (ref 2.5–4.5)
PLATELET # BLD AUTO: 228 K/UL — SIGNIFICANT CHANGE UP (ref 150–400)
PMV BLD: 10.3 FL — SIGNIFICANT CHANGE UP (ref 7–13)
POTASSIUM SERPL-MCNC: 3.5 MMOL/L — SIGNIFICANT CHANGE UP (ref 3.5–5.3)
POTASSIUM SERPL-SCNC: 3.5 MMOL/L — SIGNIFICANT CHANGE UP (ref 3.5–5.3)
RBC # BLD: 3.38 M/UL — LOW (ref 3.8–5.2)
RBC # FLD: 17.6 % — HIGH (ref 10.3–14.5)
SODIUM SERPL-SCNC: 144 MMOL/L — SIGNIFICANT CHANGE UP (ref 135–145)
WBC # BLD: 7.24 K/UL — SIGNIFICANT CHANGE UP (ref 3.8–10.5)
WBC # FLD AUTO: 7.24 K/UL — SIGNIFICANT CHANGE UP (ref 3.8–10.5)

## 2017-05-17 PROCEDURE — 75635 CT ANGIO ABDOMINAL ARTERIES: CPT | Mod: 26

## 2017-05-17 RX ORDER — OXYCODONE HYDROCHLORIDE 5 MG/1
5 TABLET ORAL EVERY 6 HOURS
Qty: 0 | Refills: 0 | Status: DISCONTINUED | OUTPATIENT
Start: 2017-05-17 | End: 2017-05-19

## 2017-05-17 RX ADMIN — PANTOPRAZOLE SODIUM 40 MILLIGRAM(S): 20 TABLET, DELAYED RELEASE ORAL at 06:32

## 2017-05-17 RX ADMIN — ISOSORBIDE MONONITRATE 60 MILLIGRAM(S): 60 TABLET, EXTENDED RELEASE ORAL at 12:02

## 2017-05-17 RX ADMIN — Medication 650 MILLIGRAM(S): at 12:40

## 2017-05-17 RX ADMIN — Medication 650 MILLIGRAM(S): at 12:02

## 2017-05-17 RX ADMIN — ATORVASTATIN CALCIUM 80 MILLIGRAM(S): 80 TABLET, FILM COATED ORAL at 21:49

## 2017-05-17 RX ADMIN — LOSARTAN POTASSIUM 100 MILLIGRAM(S): 100 TABLET, FILM COATED ORAL at 06:32

## 2017-05-17 RX ADMIN — Medication 1 MILLIGRAM(S): at 21:49

## 2017-05-17 RX ADMIN — Medication 40 MILLIGRAM(S): at 06:32

## 2017-05-17 RX ADMIN — GABAPENTIN 300 MILLIGRAM(S): 400 CAPSULE ORAL at 12:02

## 2017-05-17 RX ADMIN — PANTOPRAZOLE SODIUM 40 MILLIGRAM(S): 20 TABLET, DELAYED RELEASE ORAL at 18:15

## 2017-05-17 NOTE — PROGRESS NOTE ADULT - ASSESSMENT
85 year old F with PVD c/b GIB 12/2015, PVD, CVA with no deficits on DAPT, arthritis, HTN presented initially for Hb 5.5 s/p 2u pRBC, hemodynamically stable. Now s/p EGD/colonoscopy showing non-bleeding ulcer, no biopsy taken, +anal fissure and +diverticulosis. Stable and 1-2week GI follow-up. Patient had increased left foot pain 5/16/17 found to have severe PVD (not acute), s/p [facility] and Vascular surgery consult.    #Anemia of unclear etiology  -s/p 2u pRBC on admission with stable H/H since  -s/p EGD/colonoscopy (limited views); EGD showed a non-bleeding ulcer (rec: consider capsule study)///colonoscopy showed anal fissure and diverticulosis (recs: consider capsule study, exam could not r/o malignancy will need to follow-up with GI in 1-2 weeks as outpatient  -f/u H. Pylori stool Ag  -c/w PPI therapy    #Left foot PVD  -s/p [facility] Duplex showing severe PVD, Vascular surgery consulted, will f/u their recommendations for outpatient management  -Pain control with Tylenol    #HTN   - stable; c/w Cozaar 100mg, Cardura 1mg, Imdur 60mg, Lasix 40mg    #Hx CVA   -stable; holding ASA/Plavix in setting of bleeding    #VTE ppx - SCDs    #Dispo - f/u Vascular Sx recommendations; will d/c if stable with GI outpatient f/u in 1-2 weeks

## 2017-05-17 NOTE — PROGRESS NOTE ADULT - SUBJECTIVE AND OBJECTIVE BOX
85 year old F who presented at request of Urgent Care for Hb 5.5    SUBJECTIVE / OVERNIGHT EVENTS: No acute events overnight; patient's left lower extremity is mildly improved; vascular Sx following. No weakness, headache, syncope. Mild foot pain controlled with Tylenol    MEDICATIONS  (STANDING):  pantoprazole  Injectable 40milliGRAM(s) IV Push every 12 hours  doxazosin 1milliGRAM(s) Oral at bedtime  gabapentin 300milliGRAM(s) Oral daily  atorvastatin 80milliGRAM(s) Oral at bedtime  isosorbide   mononitrate ER Tablet (IMDUR) 60milliGRAM(s) Oral daily  losartan 100milliGRAM(s) Oral daily  furosemide    Tablet 40milliGRAM(s) Oral daily    MEDICATIONS  (PRN):  acetaminophen   Tablet. 650milliGRAM(s) Oral every 6 hours PRN Moderate Pain (4 - 6)      Vital Signs Last 24 Hrs  T(C): 36.7, Max: 37 (05-16 @ 21:30)  HR: 68 (68 - 91)  BP: 147/52 (142/73 - 177/49)  RR: 18 (18 - 18)  SpO2: 100% (96% - 100%)  Wt(kg): --  CAPILLARY BLOOD GLUCOSE    I&O's Summary      PHYSICAL EXAM:  GENERAL: NAD, well-developed  HEAD:  Atraumatic, Normocephalic  EYES: EOMI, PERRLA, conjunctiva and sclera clear  NECK: Supple, No JVD  CHEST/LUNG: Clear to auscultation bilaterally; No wheeze  HEART: Regular rate and rhythm; No murmurs, rubs, or gallops  ABDOMEN: Soft, Nontender, Nondistended; Bowel sounds present  EXTREMITIES:  Able to move left lower extremity better than yesterday; plantar/dorsiflexion now intact; ext is warm; unable to appreciate DP/PT pulses b/l  PSYCH: AAOx3  NEUROLOGY: non-focal, as above  SKIN: No rashes or lesions    LABS:                        8.5    7.24  )-----------( 228      ( 17 May 2017 06:00 )             28.4     05-17    144  |  107  |  22  ----------------------------<  105<H>  3.5   |  21<L>  |  1.22    Ca    9.1      17 May 2017 06:00  Phos  3.7     05-17  Mg     1.9     05-17                RADIOLOGY & ADDITIONAL TESTS:    Imaging Personally Reviewed:    Consultant(s) Notes Reviewed:  Vascular Surgery    Care Discussed with Consultants/Other Providers:

## 2017-05-18 LAB
APTT BLD: 26.5 SEC — LOW (ref 27.5–37.4)
BASOPHILS # BLD AUTO: 0.03 K/UL — SIGNIFICANT CHANGE UP (ref 0–0.2)
BASOPHILS NFR BLD AUTO: 0.4 % — SIGNIFICANT CHANGE UP (ref 0–2)
BUN SERPL-MCNC: 25 MG/DL — HIGH (ref 7–23)
CALCIUM SERPL-MCNC: 9.1 MG/DL — SIGNIFICANT CHANGE UP (ref 8.4–10.5)
CHLORIDE SERPL-SCNC: 100 MMOL/L — SIGNIFICANT CHANGE UP (ref 98–107)
CO2 SERPL-SCNC: 21 MMOL/L — LOW (ref 22–31)
CREAT SERPL-MCNC: 1.33 MG/DL — HIGH (ref 0.5–1.3)
EOSINOPHIL # BLD AUTO: 0.13 K/UL — SIGNIFICANT CHANGE UP (ref 0–0.5)
EOSINOPHIL NFR BLD AUTO: 1.7 % — SIGNIFICANT CHANGE UP (ref 0–6)
GLUCOSE SERPL-MCNC: 96 MG/DL — SIGNIFICANT CHANGE UP (ref 70–99)
HCT VFR BLD CALC: 26.3 % — LOW (ref 34.5–45)
HGB BLD-MCNC: 8.3 G/DL — LOW (ref 11.5–15.5)
IMM GRANULOCYTES NFR BLD AUTO: 0.3 % — SIGNIFICANT CHANGE UP (ref 0–1.5)
INR BLD: 1.1 — SIGNIFICANT CHANGE UP (ref 0.88–1.17)
LYMPHOCYTES # BLD AUTO: 1.17 K/UL — SIGNIFICANT CHANGE UP (ref 1–3.3)
LYMPHOCYTES # BLD AUTO: 15 % — SIGNIFICANT CHANGE UP (ref 13–44)
MAGNESIUM SERPL-MCNC: 1.8 MG/DL — SIGNIFICANT CHANGE UP (ref 1.6–2.6)
MCHC RBC-ENTMCNC: 26.3 PG — LOW (ref 27–34)
MCHC RBC-ENTMCNC: 31.6 % — LOW (ref 32–36)
MCV RBC AUTO: 83.2 FL — SIGNIFICANT CHANGE UP (ref 80–100)
MONOCYTES # BLD AUTO: 0.95 K/UL — HIGH (ref 0–0.9)
MONOCYTES NFR BLD AUTO: 12.2 % — SIGNIFICANT CHANGE UP (ref 2–14)
NEUTROPHILS # BLD AUTO: 5.48 K/UL — SIGNIFICANT CHANGE UP (ref 1.8–7.4)
NEUTROPHILS NFR BLD AUTO: 70.4 % — SIGNIFICANT CHANGE UP (ref 43–77)
PHOSPHATE SERPL-MCNC: 3.4 MG/DL — SIGNIFICANT CHANGE UP (ref 2.5–4.5)
PLATELET # BLD AUTO: 204 K/UL — SIGNIFICANT CHANGE UP (ref 150–400)
PMV BLD: 9.9 FL — SIGNIFICANT CHANGE UP (ref 7–13)
POTASSIUM SERPL-MCNC: 3.3 MMOL/L — LOW (ref 3.5–5.3)
POTASSIUM SERPL-SCNC: 3.3 MMOL/L — LOW (ref 3.5–5.3)
PROTHROM AB SERPL-ACNC: 12.3 SEC — SIGNIFICANT CHANGE UP (ref 9.8–13.1)
RBC # BLD: 3.16 M/UL — LOW (ref 3.8–5.2)
RBC # FLD: 19.1 % — HIGH (ref 10.3–14.5)
SODIUM SERPL-SCNC: 138 MMOL/L — SIGNIFICANT CHANGE UP (ref 135–145)
WBC # BLD: 7.78 K/UL — SIGNIFICANT CHANGE UP (ref 3.8–10.5)
WBC # FLD AUTO: 7.78 K/UL — SIGNIFICANT CHANGE UP (ref 3.8–10.5)

## 2017-05-18 RX ORDER — CLOPIDOGREL BISULFATE 75 MG/1
75 TABLET, FILM COATED ORAL DAILY
Qty: 0 | Refills: 0 | Status: DISCONTINUED | OUTPATIENT
Start: 2017-05-18 | End: 2017-05-23

## 2017-05-18 RX ORDER — ASPIRIN/CALCIUM CARB/MAGNESIUM 324 MG
81 TABLET ORAL DAILY
Qty: 0 | Refills: 0 | Status: DISCONTINUED | OUTPATIENT
Start: 2017-05-18 | End: 2017-05-23

## 2017-05-18 RX ORDER — HEPARIN SODIUM 5000 [USP'U]/ML
5000 INJECTION INTRAVENOUS; SUBCUTANEOUS EVERY 12 HOURS
Qty: 0 | Refills: 0 | Status: DISCONTINUED | OUTPATIENT
Start: 2017-05-18 | End: 2017-05-23

## 2017-05-18 RX ORDER — PANTOPRAZOLE SODIUM 20 MG/1
40 TABLET, DELAYED RELEASE ORAL
Qty: 0 | Refills: 0 | Status: DISCONTINUED | OUTPATIENT
Start: 2017-05-18 | End: 2017-05-23

## 2017-05-18 RX ADMIN — OXYCODONE HYDROCHLORIDE 5 MILLIGRAM(S): 5 TABLET ORAL at 08:30

## 2017-05-18 RX ADMIN — GABAPENTIN 300 MILLIGRAM(S): 400 CAPSULE ORAL at 14:01

## 2017-05-18 RX ADMIN — CLOPIDOGREL BISULFATE 75 MILLIGRAM(S): 75 TABLET, FILM COATED ORAL at 14:00

## 2017-05-18 RX ADMIN — OXYCODONE HYDROCHLORIDE 5 MILLIGRAM(S): 5 TABLET ORAL at 07:43

## 2017-05-18 RX ADMIN — Medication 1 MILLIGRAM(S): at 22:10

## 2017-05-18 RX ADMIN — Medication 40 MILLIGRAM(S): at 06:12

## 2017-05-18 RX ADMIN — Medication 81 MILLIGRAM(S): at 14:00

## 2017-05-18 RX ADMIN — LOSARTAN POTASSIUM 100 MILLIGRAM(S): 100 TABLET, FILM COATED ORAL at 06:12

## 2017-05-18 RX ADMIN — HEPARIN SODIUM 5000 UNIT(S): 5000 INJECTION INTRAVENOUS; SUBCUTANEOUS at 18:24

## 2017-05-18 RX ADMIN — ISOSORBIDE MONONITRATE 60 MILLIGRAM(S): 60 TABLET, EXTENDED RELEASE ORAL at 14:01

## 2017-05-18 RX ADMIN — ATORVASTATIN CALCIUM 80 MILLIGRAM(S): 80 TABLET, FILM COATED ORAL at 22:10

## 2017-05-18 RX ADMIN — PANTOPRAZOLE SODIUM 40 MILLIGRAM(S): 20 TABLET, DELAYED RELEASE ORAL at 06:13

## 2017-05-18 NOTE — PHYSICAL THERAPY INITIAL EVALUATION ADULT - PERTINENT HX OF CURRENT PROBLEM, REHAB EVAL
86 y/o F PMHx PUD c/b GIB 12/2015 -last EGD 1/2016, PVD, CVA (no deficits) on DAPT, arthritis, HTN presents from home after receiving call from Urgent Care facility for anemia.

## 2017-05-18 NOTE — PROGRESS NOTE ADULT - PROBLEM SELECTOR PLAN 1
Patient was previously on DAPT (ASA/Plavix) which was discontinued due to GI Bleed. We recommend restarting ASA/Plavix given her PAD and stents if Gastroenterology does not oppose. Her foot pain, clinically is not of vascular etiology, recommend PM&R evaluation for chronic pain. Given CT findings will likely proceed with angiogram next week.

## 2017-05-18 NOTE — PHYSICAL THERAPY INITIAL EVALUATION ADULT - RANGE OF MOTION EXAMINATION, REHAB EVAL
bilateral lower extremity ROM was WFL (within functional limits)/except LLE NT, as patient refused LLE to be touched/bilateral upper extremity ROM was WFL (within functional limits)

## 2017-05-18 NOTE — PROGRESS NOTE ADULT - SUBJECTIVE AND OBJECTIVE BOX
Patient is a 85y old Female who presents with a chief complaint of Urgent care blood-work Hb 5.5 (12 May 2017 18:40)      SUBJECTIVE / OVERNIGHT EVENTS:    MEDICATIONS  (STANDING):  pantoprazole  Injectable 40milliGRAM(s) IV Push every 12 hours  doxazosin 1milliGRAM(s) Oral at bedtime  gabapentin 300milliGRAM(s) Oral daily  atorvastatin 80milliGRAM(s) Oral at bedtime  isosorbide   mononitrate ER Tablet (IMDUR) 60milliGRAM(s) Oral daily  losartan 100milliGRAM(s) Oral daily  furosemide    Tablet 40milliGRAM(s) Oral daily  heparin  Injectable 5000Unit(s) SubCutaneous every 12 hours    MEDICATIONS  (PRN):  acetaminophen   Tablet. 650milliGRAM(s) Oral every 6 hours PRN Moderate Pain (4 - 6)  oxyCODONE IR 5milliGRAM(s) Oral every 6 hours PRN moderate or severe pain      Vital Signs Last 24 Hrs  T(C): 36.8, Max: 37.6 (05-17 @ 21:29)  HR: 68 (64 - 72)  BP: 144/60 (129/56 - 163/71)  RR: 18 (18 - 18)  SpO2: 98% (98% - 100%)  Wt(kg): --  CAPILLARY BLOOD GLUCOSE    I&O's Summary      PHYSICAL EXAM:  GENERAL: NAD, well-developed  HEAD:  Atraumatic, Normocephalic  EYES: EOMI, PERRLA, conjunctiva and sclera clear  NECK: Supple, No JVD  CHEST/LUNG: Clear to auscultation bilaterally; No wheeze  HEART: Regular rate and rhythm; No murmurs, rubs, or gallops  ABDOMEN: Soft, Nontender, Nondistended; Bowel sounds present  EXTREMITIES:  2+ Peripheral Pulses, No clubbing, cyanosis, or edema  PSYCH: AAOx3  NEUROLOGY: non-focal  SKIN: No rashes or lesions    LABS:                        8.3    7.78  )-----------( 204      ( 18 May 2017 04:27 )             26.3     05-18    138  |  100  |  25<H>  ----------------------------<  96  3.3<L>   |  21<L>  |  1.33<H>    Ca    9.1      18 May 2017 04:27  Phos  3.4     05-18  Mg     1.8     05-18      PT/INR - ( 18 May 2017 04:27 )   PT: 12.3 SEC;   INR: 1.10          PTT - ( 18 May 2017 04:27 )  PTT:26.5 SEC          RADIOLOGY & ADDITIONAL TESTS:    Imaging Personally Reviewed:    Consultant(s) Notes Reviewed:      Care Discussed with Consultants/Other Providers: Patient is a 85y old Female who presents with a chief complaint of anemia found at Urgent Care to Hb 5.5 (12 May 2017 18:40)      SUBJECTIVE / OVERNIGHT EVENTS: No acute events overnight. Still has left foot pain, able to move extremity slightly more than yesterday. Right foot pain has resolved with full movement. Anemia resolved. No fatigue, syncope, light-headedness.     MEDICATIONS  (STANDING):  pantoprazole  Injectable 40milliGRAM(s) IV Push every 12 hours  doxazosin 1milliGRAM(s) Oral at bedtime  gabapentin 300milliGRAM(s) Oral daily  atorvastatin 80milliGRAM(s) Oral at bedtime  isosorbide   mononitrate ER Tablet (IMDUR) 60milliGRAM(s) Oral daily  losartan 100milliGRAM(s) Oral daily  furosemide    Tablet 40milliGRAM(s) Oral daily  heparin  Injectable 5000Unit(s) SubCutaneous every 12 hours    MEDICATIONS  (PRN):  acetaminophen   Tablet. 650milliGRAM(s) Oral every 6 hours PRN Moderate Pain (4 - 6)  oxyCODONE IR 5milliGRAM(s) Oral every 6 hours PRN moderate or severe pain      Vital Signs Last 24 Hrs  T(C): 36.8, Max: 37.6 (05-17 @ 21:29)  HR: 68 (64 - 72)  BP: 144/60 (129/56 - 163/71)  RR: 18 (18 - 18)  SpO2: 98% (98% - 100%)      PHYSICAL EXAM:  GENERAL: NAD, well-developed  HEAD:  Atraumatic, Normocephalic  EYES: EOMI, PERRLA, conjunctiva and sclera clear  NECK: Supple, No JVD  CHEST/LUNG: Clear to auscultation bilaterally; No wheezes, rales or rhonchi  HEART: Regular rate and rhythm; No murmurs, rubs, or gallops  ABDOMEN: Soft, Nontender, Nondistended; Bowel sounds present  EXTREMITIES:  both lower extremities warm to touch, no palpable pulses b/l LE; can independently move both feet; moderate weakness L > R  PSYCH: AAOx3  NEUROLOGY: as above  SKIN: No rashes or lesions    LABS:                        8.3    7.78  )-----------( 204      ( 18 May 2017 04:27 )             26.3     05-18    138  |  100  |  25<H>  ----------------------------<  96  3.3<L>   |  21<L>  |  1.33<H>    Ca    9.1      18 May 2017 04:27  Phos  3.4     05-18  Mg     1.8     05-18      PT/INR - ( 18 May 2017 04:27 )   PT: 12.3 SEC;   INR: 1.10          PTT - ( 18 May 2017 04:27 )  PTT:26.5 SEC          RADIOLOGY & ADDITIONAL TESTS:    Imaging Personally Reviewed:    Consultant(s) Notes Reviewed:      Care Discussed with Consultants/Other Providers:

## 2017-05-18 NOTE — PHYSICAL THERAPY INITIAL EVALUATION ADULT - ADDITIONAL COMMENTS
Pt reports that she lives in a house with 5 steps to enter, stairs to second floor, pt reports that she uses [employer]W for long distance walking.

## 2017-05-18 NOTE — PROGRESS NOTE ADULT - ASSESSMENT
85F with Left foot pain. Clinically she does not demonstrate evidence of critical limb ischemia or threatened limb.

## 2017-05-18 NOTE — PROGRESS NOTE ADULT - SUBJECTIVE AND OBJECTIVE BOX
VASCULAR SURGERY PROGRESS NOTE    No events overnight. Continues to complain of chronic left foot pain.  CTA reviewed.     Gen: NAD, AAOX3  LLE: Doppler signal of dp/pt, tender to palpation  RLE: Dopp PT/DP    Vital Signs Last 24 Hrs  T(C): 36.7, Max: 37.6 ( @ 21:29)  T(F): 98.1, Max: 99.7 ( @ 21:29)  HR: 70 (64 - 72)  BP: 146/53 (129/56 - 146/53)  RR: 17 (17 - 18)  SpO2: 98% (98% - 100%)  Daily Weight in k.8 (18 May 2017 05:35)                        8.3    7.78  )-----------( 204      ( 18 May 2017 04:27 )             26.3     05-18    138  |  100  |  25<H>  ----------------------------<  96  3.3<L>   |  21<L>  |  1.33<H>    Ca    9.1      18 May 2017 04:27  Phos  3.4     -18  Mg     1.8     18      PT/INR - ( 18 May 2017 04:27 )   PT: 12.3 SEC;   INR: 1.10          PTT - ( 18 May 2017 04:27 )  PTT:26.5 SEC      IMAGING STUDIES:    CT ANGIO Abdomen/Pelvis with BL LE Runoff   Marked atherosclerotic disease including the iliac and femoral arteries bilaterally.   1.  Bilateral common iliac stent grafts are in place. The one on the right is patent with a thrombus immediately distal to the margin of the graft. The one on the left is markedly narrowed.   2.  Left external iliac stent graft is patent with multifocal severe narrowing of the left external iliac artery.  3.  Multifocal moderate narrowing of the femoral arteries bilaterally.   4.  3 vessel runoff to the lower extremities bilaterally.

## 2017-05-18 NOTE — PROGRESS NOTE ADULT - ASSESSMENT
85 year old F with PVD c/b GIB 12/2015, PVD, CVA with no deficits on DAPT, arthritis, HTN presented initially for Hb 5.5 s/p 2u pRBC, hemodynamically stable. Now s/p EGD/colonoscopy showing non-bleeding ulcer, no biopsy taken, +anal fissure and +diverticulosis. Stable and 1-2week GI follow-up. Patient had increased left foot pain 5/16/17 found to have severe PVD (not acute), s/p [facility] Duplex and Vascular surgery consult.    #Anemia of unclear etiology  -s/p 2u pRBC on admission with stable H/H since  -s/p EGD/colonoscopy (limited views); EGD showed a non-bleeding ulcer (rec: consider capsule study)///colonoscopy showed anal fissure and diverticulosis (recs: consider capsule study, exam could not r/o malignancy will need to follow-up with GI in 1-2 weeks as outpatient  -f/u H. Pylori stool Ag  -c/w PPI therapy    #Left foot PVD  -s/p [facility] Duplex showing severe PVD, Vascular surgery consulted, will f/u their recommendations for outpatient management  -Pain control with Tylenol    #HTN   - stable; c/w Cozaar 100mg, Cardura 1mg, Imdur 60mg, Lasix 40mg    #Hx CVA   -stable; holding ASA/Plavix in setting of bleeding    #VTE ppx - SCDs    #Dispo - f/u Vascular Sx recommendations; will d/c if stable with GI outpatient f/u in 1-2 weeks 85 year old F with PVD c/b GIB 12/2015, PVD, CVA with no deficits on DAPT, arthritis, HTN presented initially for Hb 5.5 s/p 2u pRBC, hemodynamically stable. Now s/p EGD/colonoscopy showing non-bleeding ulcer, no biopsy taken, +anal fissure and +diverticulosis. Stable Hb; will require 1-2 week GI follow-up. Patient had increased left foot pain 5/16/17 found to have severe PVD (not acute), s/p [facility] Duplex, CTA LLE and Vascular surgery consult.    #Anemia of unclear etiology  -s/p 2u pRBC on admission with stable H/H since  -s/p EGD/colonoscopy (limited views); EGD showed a non-bleeding ulcer (rec: consider capsule study)///colonoscopy showed anal fissure and diverticulosis (recs: consider capsule study, exam could not r/o malignancy will need to follow-up with GI in 1-2 weeks as outpatient  -f/u H. Pylori stool Ag  -c/w PPI therapy    #Left foot PVD  -s/p [facility] Duplex showing severe PVD/PAD  - Awaiting Vascular Surgery recommendations  -Pain control with Tylenol    #HTN   - stable; c/w Cozaar 100mg, Cardura 1mg, Imdur 60mg, Lasix 40mg    #Hx CVA   -stable; holding ASA/Plavix in setting of bleeding    #VTE ppx - SCDs    #Dispo - f/u Vascular Sx recommendations; f/u PT evaluation; will need outpatient GI f/u in 1-2 weeks

## 2017-05-19 LAB
BASOPHILS # BLD AUTO: 0.04 K/UL — SIGNIFICANT CHANGE UP (ref 0–0.2)
BASOPHILS NFR BLD AUTO: 0.6 % — SIGNIFICANT CHANGE UP (ref 0–2)
BUN SERPL-MCNC: 35 MG/DL — HIGH (ref 7–23)
CALCIUM SERPL-MCNC: 9.1 MG/DL — SIGNIFICANT CHANGE UP (ref 8.4–10.5)
CHLORIDE SERPL-SCNC: 101 MMOL/L — SIGNIFICANT CHANGE UP (ref 98–107)
CO2 SERPL-SCNC: 19 MMOL/L — LOW (ref 22–31)
CREAT SERPL-MCNC: 1.71 MG/DL — HIGH (ref 0.5–1.3)
EOSINOPHIL # BLD AUTO: 0.19 K/UL — SIGNIFICANT CHANGE UP (ref 0–0.5)
EOSINOPHIL NFR BLD AUTO: 2.7 % — SIGNIFICANT CHANGE UP (ref 0–6)
GLUCOSE SERPL-MCNC: 86 MG/DL — SIGNIFICANT CHANGE UP (ref 70–99)
HCT VFR BLD CALC: 26.8 % — LOW (ref 34.5–45)
HGB BLD-MCNC: 8.1 G/DL — LOW (ref 11.5–15.5)
IMM GRANULOCYTES NFR BLD AUTO: 0.3 % — SIGNIFICANT CHANGE UP (ref 0–1.5)
LYMPHOCYTES # BLD AUTO: 0.89 K/UL — LOW (ref 1–3.3)
LYMPHOCYTES # BLD AUTO: 12.5 % — LOW (ref 13–44)
MAGNESIUM SERPL-MCNC: 1.9 MG/DL — SIGNIFICANT CHANGE UP (ref 1.6–2.6)
MCHC RBC-ENTMCNC: 25.3 PG — LOW (ref 27–34)
MCHC RBC-ENTMCNC: 30.2 % — LOW (ref 32–36)
MCV RBC AUTO: 83.8 FL — SIGNIFICANT CHANGE UP (ref 80–100)
MONOCYTES # BLD AUTO: 1.06 K/UL — HIGH (ref 0–0.9)
MONOCYTES NFR BLD AUTO: 14.9 % — HIGH (ref 2–14)
NEUTROPHILS # BLD AUTO: 4.9 K/UL — SIGNIFICANT CHANGE UP (ref 1.8–7.4)
NEUTROPHILS NFR BLD AUTO: 69 % — SIGNIFICANT CHANGE UP (ref 43–77)
PHOSPHATE SERPL-MCNC: 3.7 MG/DL — SIGNIFICANT CHANGE UP (ref 2.5–4.5)
PLATELET # BLD AUTO: 213 K/UL — SIGNIFICANT CHANGE UP (ref 150–400)
PMV BLD: 10 FL — SIGNIFICANT CHANGE UP (ref 7–13)
POTASSIUM SERPL-MCNC: 3.8 MMOL/L — SIGNIFICANT CHANGE UP (ref 3.5–5.3)
POTASSIUM SERPL-SCNC: 3.8 MMOL/L — SIGNIFICANT CHANGE UP (ref 3.5–5.3)
RBC # BLD: 3.2 M/UL — LOW (ref 3.8–5.2)
RBC # FLD: 19.6 % — HIGH (ref 10.3–14.5)
SODIUM SERPL-SCNC: 139 MMOL/L — SIGNIFICANT CHANGE UP (ref 135–145)
WBC # BLD: 7.1 K/UL — SIGNIFICANT CHANGE UP (ref 3.8–10.5)
WBC # FLD AUTO: 7.1 K/UL — SIGNIFICANT CHANGE UP (ref 3.8–10.5)

## 2017-05-19 PROCEDURE — 99232 SBSQ HOSP IP/OBS MODERATE 35: CPT | Mod: GC

## 2017-05-19 RX ORDER — OXYCODONE HYDROCHLORIDE 5 MG/1
10 TABLET ORAL EVERY 6 HOURS
Qty: 0 | Refills: 0 | Status: DISCONTINUED | OUTPATIENT
Start: 2017-05-19 | End: 2017-05-23

## 2017-05-19 RX ORDER — OXYCODONE HYDROCHLORIDE 5 MG/1
5 TABLET ORAL EVERY 6 HOURS
Qty: 0 | Refills: 0 | Status: DISCONTINUED | OUTPATIENT
Start: 2017-05-19 | End: 2017-05-23

## 2017-05-19 RX ORDER — SODIUM CHLORIDE 9 MG/ML
1000 INJECTION INTRAMUSCULAR; INTRAVENOUS; SUBCUTANEOUS
Qty: 0 | Refills: 0 | Status: DISCONTINUED | OUTPATIENT
Start: 2017-05-19 | End: 2017-05-23

## 2017-05-19 RX ADMIN — Medication 81 MILLIGRAM(S): at 13:13

## 2017-05-19 RX ADMIN — LOSARTAN POTASSIUM 100 MILLIGRAM(S): 100 TABLET, FILM COATED ORAL at 06:16

## 2017-05-19 RX ADMIN — HEPARIN SODIUM 5000 UNIT(S): 5000 INJECTION INTRAVENOUS; SUBCUTANEOUS at 17:33

## 2017-05-19 RX ADMIN — Medication 40 MILLIGRAM(S): at 06:16

## 2017-05-19 RX ADMIN — GABAPENTIN 300 MILLIGRAM(S): 400 CAPSULE ORAL at 13:13

## 2017-05-19 RX ADMIN — PANTOPRAZOLE SODIUM 40 MILLIGRAM(S): 20 TABLET, DELAYED RELEASE ORAL at 06:16

## 2017-05-19 RX ADMIN — HEPARIN SODIUM 5000 UNIT(S): 5000 INJECTION INTRAVENOUS; SUBCUTANEOUS at 06:16

## 2017-05-19 RX ADMIN — SODIUM CHLORIDE 100 MILLILITER(S): 9 INJECTION INTRAMUSCULAR; INTRAVENOUS; SUBCUTANEOUS at 10:07

## 2017-05-19 RX ADMIN — CLOPIDOGREL BISULFATE 75 MILLIGRAM(S): 75 TABLET, FILM COATED ORAL at 13:13

## 2017-05-19 RX ADMIN — ISOSORBIDE MONONITRATE 60 MILLIGRAM(S): 60 TABLET, EXTENDED RELEASE ORAL at 13:14

## 2017-05-19 RX ADMIN — Medication 1 MILLIGRAM(S): at 21:48

## 2017-05-19 RX ADMIN — ATORVASTATIN CALCIUM 80 MILLIGRAM(S): 80 TABLET, FILM COATED ORAL at 21:48

## 2017-05-19 NOTE — PROGRESS NOTE ADULT - SUBJECTIVE AND OBJECTIVE BOX
VASCULAR SURGERY PROGRESS NOTE    No events overnight. Continues to complain of chronic left foot pain.  CTA reviewed.     Gen: NAD, AAOX3  LLE: Doppler signal of dp/pt, tender to palpation  RLE: Dopp PT/DP    Vital Signs Last 24 Hrs  T(C): 36.8, Max: 36.8 (05-19 @ 05:37)  T(F): 98.3, Max: 98.3 (05-19 @ 05:37)  HR: 67 (67 - 74)  BP: 149/64 (146/53 - 149/64)  BP(mean): --  RR: 18 (17 - 18)  SpO2: 97% (97% - 98%)                        8.3    7.78  )-----------( 204      ( 18 May 2017 04:27 )             26.3     05-18    138  |  100  |  25<H>  ----------------------------<  96  3.3<L>   |  21<L>  |  1.33<H>    Ca    9.1      18 May 2017 04:27  Phos  3.4     05-18  Mg     1.8     05-18      PT/INR - ( 18 May 2017 04:27 )   PT: 12.3 SEC;   INR: 1.10          PTT - ( 18 May 2017 04:27 )  PTT:26.5 SEC      IMAGING STUDIES:    CT ANGIO Abdomen/Pelvis with BL LE Runoff   Marked atherosclerotic disease including the iliac and femoral arteries bilaterally.   1.  Bilateral common iliac stent grafts are in place. The one on the right is patent with a thrombus immediately distal to the margin of the graft. The one on the left is markedly narrowed.   2.  Left external iliac stent graft is patent with multifocal severe narrowing of the left external iliac artery.  3.  Multifocal moderate narrowing of the femoral arteries bilaterally.   4.  3 vessel runoff to the lower extremities bilaterally.

## 2017-05-19 NOTE — DISCHARGE NOTE ADULT - ADDITIONAL INSTRUCTIONS
Please make a follow-up appointment with Dr. Chung (GI) within 1-2 weeks of discharge  Please make a follow-up appointment with Dr. Quintana (Vascular Surgery) within 2-3 weeks of discharge for an outpatient angiogram  Please return to the ED for new/worsening symptoms

## 2017-05-19 NOTE — DISCHARGE NOTE ADULT - PLAN OF CARE
You came to the hospital with a diagnosis of peripheral vascular disease (PVD). Initially this issue was stable, but during the hospitalization your left foot began to hurt and you were unable to move the extremity. You were evaluated by Vascular Surgery. After an arterial doppler (ultrasound) and CT:angriography of your lower extremities it was determined this was not an acute ischemic event and the surgeons recommended an angiogram to further evaluate your vasculature. During the hospitalization (once your anemia was resolved), you were continued on your home Aspirin and Plavix. You were continued on your home Neurontin 300mg throughout the hospitalization. Please continue to take your medication as prescribed and follow-up with your primary care physician and Dr. Quintana within 1 week of discharge. You came to the hospital with a diagnosis of hypertension. Your blood pressure medication was initially held secondary to your anemia and bleeding concern. You then were started on your home Lasix 40mg, Cozaar 100mg, Cardura 1mg, Imdur 60mg and your blood pressure was well controlled. Please continue to take your medication as prescribed and follow-up with Dr. Rasmussen within 1 week of discharge concerning your recent hospitalization. You came to the hospital with a diagnosis of having had a TIA in the past. You were started on your home medication Lipitor 80mg and did well. Please continue to take your medication as prescribed and follow-up with Dr. Rasmussen within 1 week of discharge concerning your recent hospitalization. Normal hemoglobin level Resolution Symptomatic relief Normal blood pressure Prevent complications 2 units of  packed red blood cells You came to the hospital for anemia to Hb 5.5. You received 2 units of packed red blood cells during this hospitalization with resolution of your anemia. You did well. The ultimate cause of this anemia is unknown, but on GI imaging (with an EGD) you were found to have a small non-bleeding ulcer. Please follow-up with the GI department in 1-2 weeks after discharge. You came to the hospital with a diagnosis of peripheral vascular disease (PVD). Initially this issue was stable, but during the hospitalization your left foot began to hurt and you were unable to move the extremity. You were evaluated by Vascular Surgery. After an arterial doppler (ultrasound) and CT:angriography of your lower extremities it was determined this was not an acute ischemic event and the surgeons recommended an angiogram to further evaluate your vasculature. During the hospitalization (once your anemia was resolved), you were continued on your home Aspirin and Plavix. You were continued on your home Neurontin 300mg throughout the hospitalization. Please continue to take your medication as prescribed and follow-up with your primary care physician and Dr. Quintana within 1-2 week of discharge.

## 2017-05-19 NOTE — PROGRESS NOTE ADULT - ASSESSMENT
85 year old F with PVD c/b GIB 12/2015, PVD, CVA with no deficits on DAPT, arthritis, HTN presented initially for Hb 5.5 s/p 2u pRBC, hemodynamically stable. Now s/p EGD/colonoscopy showing non-bleeding ulcer, no biopsy taken, +anal fissure and +diverticulosis. Stable Hb; will require 1-2 week GI follow-up. Patient had increased left foot pain 5/16/17 found to have severe PAD (not acute), s/p VA Duplex, CTA LLE and Vascular surgery consult. Pending inpatient rehab, will receive angiogram as an outpatient.    #Anemia of unclear etiology - Resolved  -s/p 2u pRBC on admission with stable H/H since  -s/p EGD/colonoscopy (limited views); EGD showed a non-bleeding ulcer (rec: consider capsule study)///colonoscopy showed anal fissure and diverticulosis (recs: consider capsule study, exam could not r/o malignancy will need to follow-up with GI in 1-2 weeks as outpatient)  -f/u H. Pylori stool Ag  -c/w PPI therapy, changed to PO yesterday    #Left foot PVD  -s/p VA Duplex showing severe PVD/PAD  -Vascular Surgery recommends outpatient angiogram for LLE +/- PM&R consult  -Pain control with Tylenol    #HTN   - stable; c/w Cozaar 100mg, Cardura 1mg, Imdur 60mg, Lasix 40mg    #Hx CVA -stable  -restarted home aspirin and Plavix yesterday without event    #VTE ppx - SCDs    #Dispo - PT recommends inpatient rehabilitation facility; will need outpatient Angiogram per Vascular Surgery and outpatient GI follow-up in 1-2 weeks

## 2017-05-19 NOTE — PROGRESS NOTE ADULT - ASSESSMENT
85F with Left foot pain. Clinically she does not demonstrate evidence of critical limb ischemia or threatened limb.       Plan: Patient was previously on DAPT (ASA/Plavix) which was discontinued due to GI Bleed. We recommend restarting ASA/Plavix given her PAD and stents if Gastroenterology does not oppose. Her foot pain, clinically is not of vascular etiology, recommend PM&R evaluation for chronic pain. Given CT findings will likely proceed with angiogram next week if patient still in the hospital, otherwise, plan for angiogram as outpatient.

## 2017-05-19 NOTE — DISCHARGE NOTE ADULT - CARE PLAN
Principal Discharge DX:	Anemia  Goal:	Normal hemoglobin level  Instructions for follow-up, activity and diet:	2 units of  packed red blood cells  Secondary Diagnosis:	Gastric ulcer  Goal:	Resolution  Secondary Diagnosis:	PVD (peripheral vascular disease)  Goal:	Symptomatic relief  Instructions for follow-up, activity and diet:	You came to the hospital with a diagnosis of peripheral vascular disease (PVD). Initially this issue was stable, but during the hospitalization your left foot began to hurt and you were unable to move the extremity. You were evaluated by Vascular Surgery. After an arterial doppler (ultrasound) and CT:angriography of your lower extremities it was determined this was not an acute ischemic event and the surgeons recommended an angiogram to further evaluate your vasculature. During the hospitalization (once your anemia was resolved), you were continued on your home Aspirin and Plavix. You were continued on your home Neurontin 300mg throughout the hospitalization. Please continue to take your medication as prescribed and follow-up with your primary care physician and Dr. Quintana within 1 week of discharge.  Secondary Diagnosis:	HTN (hypertension)  Goal:	Normal blood pressure  Instructions for follow-up, activity and diet:	You came to the hospital with a diagnosis of hypertension. Your blood pressure medication was initially held secondary to your anemia and bleeding concern. You then were started on your home Lasix 40mg, Cozaar 100mg, Cardura 1mg, Imdur 60mg and your blood pressure was well controlled. Please continue to take your medication as prescribed and follow-up with Dr. Rasmussen within 1 week of discharge concerning your recent hospitalization.  Secondary Diagnosis:	TIA (transient ischemic attack)  Goal:	Prevent complications  Instructions for follow-up, activity and diet:	You came to the hospital with a diagnosis of having had a TIA in the past. You were started on your home medication Lipitor 80mg and did well. Please continue to take your medication as prescribed and follow-up with Dr. Rasmussen within 1 week of discharge concerning your recent hospitalization. Principal Discharge DX:	Anemia  Goal:	Normal hemoglobin level  Instructions for follow-up, activity and diet:	You came to the hospital for anemia to Hb 5.5. You received 2 units of packed red blood cells during this hospitalization with resolution of your anemia. You did well. The ultimate cause of this anemia is unknown, but on GI imaging (with an EGD) you were found to have a small non-bleeding ulcer. Please follow-up with the GI department in 1-2 weeks after discharge.  Secondary Diagnosis:	Gastric ulcer  Goal:	Resolution  Instructions for follow-up, activity and diet:	You came to the hospital for anemia to Hb 5.5. You received 2 units of packed red blood cells during this hospitalization with resolution of your anemia. You did well. The ultimate cause of this anemia is unknown, but on GI imaging (with an EGD) you were found to have a small non-bleeding ulcer. Please follow-up with the GI department in 1-2 weeks after discharge.  Secondary Diagnosis:	PVD (peripheral vascular disease)  Goal:	Symptomatic relief  Instructions for follow-up, activity and diet:	You came to the hospital with a diagnosis of peripheral vascular disease (PVD). Initially this issue was stable, but during the hospitalization your left foot began to hurt and you were unable to move the extremity. You were evaluated by Vascular Surgery. After an arterial doppler (ultrasound) and CT:angriography of your lower extremities it was determined this was not an acute ischemic event and the surgeons recommended an angiogram to further evaluate your vasculature. During the hospitalization (once your anemia was resolved), you were continued on your home Aspirin and Plavix. You were continued on your home Neurontin 300mg throughout the hospitalization. Please continue to take your medication as prescribed and follow-up with your primary care physician and Dr. Quintana within 1-2 week of discharge.  Secondary Diagnosis:	HTN (hypertension)  Goal:	Normal blood pressure  Instructions for follow-up, activity and diet:	You came to the hospital with a diagnosis of hypertension. Your blood pressure medication was initially held secondary to your anemia and bleeding concern. You then were started on your home Lasix 40mg, Cozaar 100mg, Cardura 1mg, Imdur 60mg and your blood pressure was well controlled. Please continue to take your medication as prescribed and follow-up with Dr. Rasmussen within 1 week of discharge concerning your recent hospitalization.  Secondary Diagnosis:	TIA (transient ischemic attack)  Goal:	Prevent complications  Instructions for follow-up, activity and diet:	You came to the hospital with a diagnosis of having had a TIA in the past. You were started on your home medication Lipitor 80mg and did well. Please continue to take your medication as prescribed and follow-up with Dr. Rasmussen within 1 week of discharge concerning your recent hospitalization.

## 2017-05-19 NOTE — DISCHARGE NOTE ADULT - CONDITIONS AT DISCHARGE
Patient alert and forgetful at times, vs wnl, no c/o pain, patient discharged today accompanied by ambulette, no distress noted.

## 2017-05-19 NOTE — DISCHARGE NOTE ADULT - CARE PROVIDER_API CALL
Shannan Quintana), Surgery  [address] [place] [address]  Phone: [phone]  Fax: [phone]    Amrit Rasmussen  [address] [place] [address]  Phone: [phone]  Fax: (   )    - Shannan Quintana), Surgery  [address] [place] [address]  Phone: [phone]  Fax: [phone]    Amrit Rasmussen  [address] [place] [address]  Phone: [phone]  Fax: (   )    -    Ton Chung), Gastroenterology; Internal Medicine  [address] [place] [address]  Phone: [phone]  Fax: [phone]

## 2017-05-19 NOTE — DISCHARGE NOTE ADULT - MEDICATION SUMMARY - MEDICATIONS TO STOP TAKING
I will STOP taking the medications listed below when I get home from the hospital:    Medrol Dosepak 4 mg oral tablet  --  by mouth (Use as directed - taper)

## 2017-05-19 NOTE — PROGRESS NOTE ADULT - SUBJECTIVE AND OBJECTIVE BOX
Patient is a 85y old  Female who presents with a chief complaint of anemia discovered at Urgent Care blood-work Hb 5.5 (12 May 2017 18:40)      SUBJECTIVE / OVERNIGHT EVENTS: No acute events overnight. Patient endorses more mobility of LLE and less pain/tenderness. Plan for inpatient rehab. Will get angiogram as outpatient with vascular surgery. No chest pain, shortness of breath, weakness, dizziness, abdominal pain. Distal lower extremity pulses present on doppler per Vascular Surgery.  Restarted home Aspirin and Plavix yesterday.    MEDICATIONS  (STANDING):    doxazosin 1milliGRAM(s) Oral at bedtime  gabapentin 300milliGRAM(s) Oral daily  atorvastatin 80milliGRAM(s) Oral at bedtime  isosorbide   mononitrate ER Tablet (IMDUR) 60milliGRAM(s) Oral daily  losartan 100milliGRAM(s) Oral daily  furosemide    Tablet 40milliGRAM(s) Oral daily  heparin  Injectable 5000Unit(s) SubCutaneous every 12 hours  aspirin enteric coated 81milliGRAM(s) Oral daily  clopidogrel Tablet 75milliGRAM(s) Oral daily  pantoprazole    Tablet 40milliGRAM(s) Oral before breakfast    MEDICATIONS  (PRN):  acetaminophen   Tablet. 650milliGRAM(s) Oral every 6 hours PRN Moderate Pain (4 - 6)  oxyCODONE IR 5milliGRAM(s) Oral every 6 hours PRN moderate or severe pain      Vital Signs Last 24 Hrs  T(C): 36.8, Max: 36.8 (05-19 @ 05:37)  HR: 67 (67 - 74)  BP: 149/64 (146/53 - 149/64)  RR: 18 (17 - 18)  SpO2: 97% (97% - 98%)      PHYSICAL EXAM:  GENERAL: NAD, well-developed  HEAD:  Atraumatic, Normocephalic  EYES: EOMI, PERRLA, conjunctiva and sclera clear  NECK: Supple, No JVD  CHEST/LUNG: Clear to auscultation bilaterally; No wheezes, rales or rhonchi  HEART: Regular rate and rhythm; No murmurs, rubs, or gallops  ABDOMEN: Soft, Nontender, Nondistended; Bowel sounds present  EXTREMITIES: No clubbing, cyanosis, or edema, distal extremities warm, no palpable pulses in DP/PT, but doppler waveform present  PSYCH: AAOx3  NEUROLOGY: non-focal  SKIN: No rashes or lesions    LABS:              Pending          RADIOLOGY & ADDITIONAL TESTS:    Imaging Personally Reviewed:    Consultant(s) Notes Reviewed:  Vascular Surgery    Care Discussed with Consultants/Other Providers: Patient is a 85y old  Female who presents with a chief complaint of anemia discovered at Urgent Care blood-work Hb 5.5 (12 May 2017 18:40)      SUBJECTIVE / OVERNIGHT EVENTS: No acute events overnight. Patient endorses more mobility of LLE and less pain/tenderness. Plan for inpatient rehab. Will get angiogram as outpatient with vascular surgery. No chest pain, shortness of breath, weakness, dizziness, abdominal pain. Distal lower extremity pulses present on doppler per Vascular Surgery.  Restarted home Aspirin and Plavix yesterday.    MEDICATIONS  (STANDING):    doxazosin 1milliGRAM(s) Oral at bedtime  gabapentin 300milliGRAM(s) Oral daily  atorvastatin 80milliGRAM(s) Oral at bedtime  isosorbide   mononitrate ER Tablet (IMDUR) 60milliGRAM(s) Oral daily  losartan 100milliGRAM(s) Oral daily  furosemide    Tablet 40milliGRAM(s) Oral daily  heparin  Injectable 5000Unit(s) SubCutaneous every 12 hours  aspirin enteric coated 81milliGRAM(s) Oral daily  clopidogrel Tablet 75milliGRAM(s) Oral daily  pantoprazole    Tablet 40milliGRAM(s) Oral before breakfast    MEDICATIONS  (PRN):  acetaminophen   Tablet. 650milliGRAM(s) Oral every 6 hours PRN Moderate Pain (4 - 6)  oxyCODONE IR 5milliGRAM(s) Oral every 6 hours PRN moderate or severe pain      Vital Signs Last 24 Hrs  T(C): 36.8, Max: 36.8 (05-19 @ 05:37)  HR: 67 (67 - 74)  BP: 149/64 (146/53 - 149/64)  RR: 18 (17 - 18)  SpO2: 97% (97% - 98%)      PHYSICAL EXAM:  GENERAL: NAD, well-developed  HEAD:  Atraumatic, Normocephalic  EYES: EOMI, PERRLA, conjunctiva and sclera clear  NECK: Supple, No JVD  CHEST/LUNG: Clear to auscultation bilaterally; No wheezes, rales or rhonchi  HEART: Regular rate and rhythm; No murmurs, rubs, or gallops  ABDOMEN: Soft, Nontender, Nondistended; Bowel sounds present  EXTREMITIES: No clubbing, cyanosis, or edema, distal extremities warm, no palpable pulses in DP/PT, but doppler waveform present  PSYCH: AAOx3  NEUROLOGY: non-focal  SKIN: No rashes or lesions    LABS:                                   8.1    7.10  )-----------( 213      ( 19 May 2017 05:55 )             26.8     05-19    139  |  101  |  35<H>  ----------------------------<  86  3.8   |  19<L>  |  1.71<H>    Ca    9.1      19 May 2017 05:58  Phos  3.7     05-19  Mg     1.9     05-19      CAPILLARY BLOOD GLUCOSE      PT/INR - ( 18 May 2017 04:27 )   PT: 12.3 SEC;   INR: 1.10          PTT - ( 18 May 2017 04:27 )  PTT:26.5 SEC Pending          RADIOLOGY & ADDITIONAL TESTS:    Imaging Personally Reviewed:    Consultant(s) Notes Reviewed:  Vascular Surgery    Care Discussed with Consultants/Other Providers:

## 2017-05-19 NOTE — DISCHARGE NOTE ADULT - PATIENT PORTAL LINK FT
“You can access the FollowHealth Patient Portal, offered by [facility], by registering with the following website: [email or web address]”

## 2017-05-19 NOTE — DISCHARGE NOTE ADULT - MEDICATION SUMMARY - MEDICATIONS TO TAKE
I will START or STAY ON the medications listed below when I get home from the hospital:    aspirin 81 mg oral delayed release tablet  -- 1 tab(s) by mouth once a day  -- Indication: For PVD (peripheral vascular disease)    losartan 100 mg oral tablet  -- 1 tab(s) by mouth once a day  -- Indication: For HTN (hypertension)    doxazosin 1 mg oral tablet  -- 1 tab(s) by mouth once a day (at bedtime)  -- Indication: For HTN (hypertension)    isosorbide mononitrate 60 mg oral tablet, extended release  -- 1 tab(s) by mouth once a day  -- Indication: For HTN (hypertension)    gabapentin 300 mg oral capsule  -- 1 cap(s) by mouth once a day  -- Indication: For PVD (peripheral vascular disease)    atorvastatin 80 mg oral tablet  -- 1 tab(s) by mouth once a day  -- Indication: For TIA (transient ischemic attack)    clopidogrel 75 mg oral tablet  -- 1 tab(s) by mouth once a day  -- Indication: For PVD (peripheral vascular disease)    furosemide 40 mg oral tablet  -- 1 tab(s) by mouth once a day  -- Indication: For HTN (hypertension)    pantoprazole 40 mg oral delayed release tablet  -- 1 tab(s) by mouth 2 times a day  -- Indication: For Gastric ulcer

## 2017-05-19 NOTE — DISCHARGE NOTE ADULT - PROVIDER TOKENS
TOKEN:'[id number]',FREE:[LAST:[Valery],FIRST:[Amrit],PHONE:[phone]:[(   )    -],ADDRESS:[[address] [place] [address]]] TOKEN:'[id number]',FREE:[LAST:[Valery],FIRST:[Amrit],PHONE:[phone]],FAX:[(   )    -],ADDRESS:[[address] [place] [address]]],TOKEN:'[id number]'

## 2017-05-19 NOTE — DISCHARGE NOTE ADULT - CARE PROVIDERS DIRECT ADDRESSES
[email or web address]_Unknown [email or web address],DirectAddress_Unknown,[email or web address]

## 2017-05-19 NOTE — DISCHARGE NOTE ADULT - COMMUNITY RESOURCES
[facility] ([facility]) [address] [place] [address] [phone]  [facility] [phone] [facility] ([facility]) [address], [place] [id number] [phone]  [facility] [phone]

## 2017-05-19 NOTE — DISCHARGE NOTE ADULT - HOSPITAL COURSE
Patient was admitted to the hospital for anemia found at Urgent Care to Hb 5.5, she received 2u pRBC in the ED and responded appropriately. He was seen by the GI department given a history of gastric ulcer as a possible cause to the anemia. She received an EGD/Colonoscopy by Dr. Chung which showed; Colonoscopy: - Anal fissure found on perianal exam.- Diverticulosis in the sigmoid colon and in the descending colon. EGD: - Hiatus hernia - Non-bleeding gastric ulcer with no stigmata of bleeding- Erythematous mucosa in the antrum and duodenum.    The patient did well and was set to be discharged with GI follow-up when she developed profound LLE pain with movement, absence of pulses with preserved warmth and doppler flow. In the work-up of this symptom she received a CTA Lower Extremities which showed: Marked atherosclerotic disease including the iliac and femoral arteries bilaterally.   1.  Bilateral common iliac stent grafts are in place. The one on the right is patent with a thrombus immediately distal to the margin of the graft. The one on the left is markedly narrowed.   2.  Left external iliac stent graft is patent with multifocal severe narrowing of the left external iliac artery..   3.  Multifocal moderate narrowing of the femoral arteries bilaterally.   4.  3 vessel runoff to the lower extremities bilaterally.  VA Duplex:  1. Right lower extremity LOUIE is moderately reduced (0.70). Right TBI is mildly reduced (0.55). Findings suggest the presence of multi-level arterial disease in the rightlower extremity.  2. Left lower extremity LOUIE is moderately reduced (0.40). Left TBI is severely reduced (0.24). Findings suggest thepresence of multi-level arterial disease in the left lower extremity.  3. Elevated brachial pressures (Right 194 mmHg; Left 186mmHg).    This marked vascular disease was evaluated by Vascular Surgery (Dr. Quintana) whom recommended outpatient Angiogram to further evaluate and possibly intervene upon.     Course was complicated by minor, transient CELI which resolved spontaneously. All home medications were continued when clinically appropriate after patient was deemed hemodynamically stable. Patient was discharged 5/23/17 stable to inpatient rehab with instructions to follow-up with GI and Vascular Sugery as and outpatient.

## 2017-05-20 LAB
BASOPHILS # BLD AUTO: 0.03 K/UL — SIGNIFICANT CHANGE UP (ref 0–0.2)
BASOPHILS NFR BLD AUTO: 0.5 % — SIGNIFICANT CHANGE UP (ref 0–2)
BUN SERPL-MCNC: 34 MG/DL — HIGH (ref 7–23)
CALCIUM SERPL-MCNC: 9.1 MG/DL — SIGNIFICANT CHANGE UP (ref 8.4–10.5)
CHLORIDE SERPL-SCNC: 103 MMOL/L — SIGNIFICANT CHANGE UP (ref 98–107)
CO2 SERPL-SCNC: 22 MMOL/L — SIGNIFICANT CHANGE UP (ref 22–31)
CREAT SERPL-MCNC: 1.56 MG/DL — HIGH (ref 0.5–1.3)
EOSINOPHIL # BLD AUTO: 0.19 K/UL — SIGNIFICANT CHANGE UP (ref 0–0.5)
EOSINOPHIL NFR BLD AUTO: 3 % — SIGNIFICANT CHANGE UP (ref 0–6)
GLUCOSE SERPL-MCNC: 109 MG/DL — HIGH (ref 70–99)
HCT VFR BLD CALC: 25.6 % — LOW (ref 34.5–45)
HGB BLD-MCNC: 7.6 G/DL — LOW (ref 11.5–15.5)
IMM GRANULOCYTES NFR BLD AUTO: 0.3 % — SIGNIFICANT CHANGE UP (ref 0–1.5)
LYMPHOCYTES # BLD AUTO: 0.78 K/UL — LOW (ref 1–3.3)
LYMPHOCYTES # BLD AUTO: 12.5 % — LOW (ref 13–44)
MAGNESIUM SERPL-MCNC: 2 MG/DL — SIGNIFICANT CHANGE UP (ref 1.6–2.6)
MCHC RBC-ENTMCNC: 25.1 PG — LOW (ref 27–34)
MCHC RBC-ENTMCNC: 29.7 % — LOW (ref 32–36)
MCV RBC AUTO: 84.5 FL — SIGNIFICANT CHANGE UP (ref 80–100)
MONOCYTES # BLD AUTO: 0.76 K/UL — SIGNIFICANT CHANGE UP (ref 0–0.9)
MONOCYTES NFR BLD AUTO: 12.2 % — SIGNIFICANT CHANGE UP (ref 2–14)
NEUTROPHILS # BLD AUTO: 4.45 K/UL — SIGNIFICANT CHANGE UP (ref 1.8–7.4)
NEUTROPHILS NFR BLD AUTO: 71.5 % — SIGNIFICANT CHANGE UP (ref 43–77)
PHOSPHATE SERPL-MCNC: 3.7 MG/DL — SIGNIFICANT CHANGE UP (ref 2.5–4.5)
PLATELET # BLD AUTO: 199 K/UL — SIGNIFICANT CHANGE UP (ref 150–400)
PMV BLD: 9.7 FL — SIGNIFICANT CHANGE UP (ref 7–13)
POTASSIUM SERPL-MCNC: 3.8 MMOL/L — SIGNIFICANT CHANGE UP (ref 3.5–5.3)
POTASSIUM SERPL-SCNC: 3.8 MMOL/L — SIGNIFICANT CHANGE UP (ref 3.5–5.3)
RBC # BLD: 3.03 M/UL — LOW (ref 3.8–5.2)
RBC # FLD: 19.9 % — HIGH (ref 10.3–14.5)
SODIUM SERPL-SCNC: 140 MMOL/L — SIGNIFICANT CHANGE UP (ref 135–145)
WBC # BLD: 6.23 K/UL — SIGNIFICANT CHANGE UP (ref 3.8–10.5)
WBC # FLD AUTO: 6.23 K/UL — SIGNIFICANT CHANGE UP (ref 3.8–10.5)

## 2017-05-20 RX ADMIN — OXYCODONE HYDROCHLORIDE 10 MILLIGRAM(S): 5 TABLET ORAL at 05:59

## 2017-05-20 RX ADMIN — Medication 40 MILLIGRAM(S): at 05:47

## 2017-05-20 RX ADMIN — HEPARIN SODIUM 5000 UNIT(S): 5000 INJECTION INTRAVENOUS; SUBCUTANEOUS at 05:47

## 2017-05-20 RX ADMIN — Medication 1 MILLIGRAM(S): at 21:32

## 2017-05-20 RX ADMIN — OXYCODONE HYDROCHLORIDE 5 MILLIGRAM(S): 5 TABLET ORAL at 13:58

## 2017-05-20 RX ADMIN — LOSARTAN POTASSIUM 100 MILLIGRAM(S): 100 TABLET, FILM COATED ORAL at 05:47

## 2017-05-20 RX ADMIN — OXYCODONE HYDROCHLORIDE 10 MILLIGRAM(S): 5 TABLET ORAL at 07:01

## 2017-05-20 RX ADMIN — OXYCODONE HYDROCHLORIDE 5 MILLIGRAM(S): 5 TABLET ORAL at 14:47

## 2017-05-20 RX ADMIN — PANTOPRAZOLE SODIUM 40 MILLIGRAM(S): 20 TABLET, DELAYED RELEASE ORAL at 07:27

## 2017-05-20 RX ADMIN — ATORVASTATIN CALCIUM 80 MILLIGRAM(S): 80 TABLET, FILM COATED ORAL at 21:31

## 2017-05-20 RX ADMIN — GABAPENTIN 300 MILLIGRAM(S): 400 CAPSULE ORAL at 12:49

## 2017-05-20 RX ADMIN — CLOPIDOGREL BISULFATE 75 MILLIGRAM(S): 75 TABLET, FILM COATED ORAL at 12:49

## 2017-05-20 RX ADMIN — ISOSORBIDE MONONITRATE 60 MILLIGRAM(S): 60 TABLET, EXTENDED RELEASE ORAL at 12:49

## 2017-05-20 RX ADMIN — Medication 81 MILLIGRAM(S): at 12:49

## 2017-05-20 RX ADMIN — HEPARIN SODIUM 5000 UNIT(S): 5000 INJECTION INTRAVENOUS; SUBCUTANEOUS at 17:22

## 2017-05-20 NOTE — PROGRESS NOTE ADULT - ASSESSMENT
85 year old F with PVD c/b GIB 12/2015, PVD, CVA with no deficits on DAPT, arthritis, HTN presented initially for Hb 5.5 s/p 2u pRBC, hemodynamically stable. Now s/p EGD/colonoscopy showing non-bleeding ulcer, no biopsy taken, +anal fissure and +diverticulosis. Stable Hb; will require 1-2 week GI follow-up. Patient had increased left foot pain 5/16/17 found to have severe PAD (not acute), s/p VA Duplex, CTA LLE and Vascular surgery consult. Pending inpatient rehab, will receive angiogram as an inpatient v. outpatient pending timing of rehab approval    #Left foot PVD  -s/p VA Duplex showing severe PVD/PAD  -Vascular Surgery recommends angiogram for LLE; will plan for outpatient angiogram, but if patient is still hospitalized next week, will attempt to get exam done inpatient   -Pain control with Tylenol  -Follow-up Vascular Surgery recommendations    #Anemia of unclear etiology - Resolved  -s/p 2u pRBC on admission with stable H/H since  -s/p EGD/colonoscopy (limited views); EGD showed a non-bleeding ulcer (rec: consider capsule study)///colonoscopy showed anal fissure and diverticulosis (recs: consider capsule study, exam could not r/o malignancy will need to follow-up with GI in 1-2 weeks as outpatient)  -f/u H. Pylori stool Ag  -c/w PO Protonix  -Monitor daily CBC    #HTN -stable  -c/w Cozaar 100mg, Cardura 1mg, Imdur 60mg, Lasix 40mg    #Hx CVA -stable  -continue with home Aspirin and Plavix    #VTE ppx  -SQH    #Dispo - PT recommends inpatient rehabilitation facility; will need Angiogram (Outpatient v. Inpatient) per Vascular Surgery and outpatient GI follow-up in 1-2 weeks

## 2017-05-20 NOTE — PROGRESS NOTE ADULT - SUBJECTIVE AND OBJECTIVE BOX
Patient is a 85y old  Female who presents with a chief complaint of Sent from Urgent Care for anemia (Hb 5.5) (19 May 2017 13:23)      SUBJECTIVE / OVERNIGHT EVENTS: No acute events overnight. Patient able to move LLE with mild-moderate pain. +Passive ROM. Unable to bear weight. Patient is encouraged to ambulate with assistance today. Pending inpatient rehab insurance approval & inpatient v. outpatient angiogram per Vascular Surgery. No headache, dizziness, chest pain, shortness of breath, abdominal pain.    MEDICATIONS  (STANDING):  doxazosin 1milliGRAM(s) Oral at bedtime  gabapentin 300milliGRAM(s) Oral daily  atorvastatin 80milliGRAM(s) Oral at bedtime  isosorbide   mononitrate ER Tablet (IMDUR) 60milliGRAM(s) Oral daily  losartan 100milliGRAM(s) Oral daily  furosemide    Tablet 40milliGRAM(s) Oral daily  heparin  Injectable 5000Unit(s) SubCutaneous every 12 hours  aspirin enteric coated 81milliGRAM(s) Oral daily  clopidogrel Tablet 75milliGRAM(s) Oral daily  pantoprazole    Tablet 40milliGRAM(s) Oral before breakfast  sodium chloride 0.9%. 1000milliLiter(s) IV Continuous <Continuous>    MEDICATIONS  (PRN):  acetaminophen   Tablet. 650milliGRAM(s) Oral every 6 hours PRN Moderate Pain (4 - 6)  oxyCODONE IR 10milliGRAM(s) Oral every 6 hours PRN Severe Pain (7 - 10)  oxyCODONE IR 5milliGRAM(s) Oral every 6 hours PRN Moderate Pain (4 - 6)      Vital Signs Last 24 Hrs  T(C): 36.8, Max: 37.4 (05-19 @ 12:25)  HR: 69 (66 - 70)  BP: 185/64 (127/75 - 185/64)  RR: 18 (18 - 18)  SpO2: 97% (97% - 100%)      PHYSICAL EXAM:  GENERAL: NAD, well-developed  HEAD:  Atraumatic, Normocephalic  EYES: EOMI, PERRLA, conjunctiva and sclera clear  NECK: Supple, No JVD  CHEST/LUNG: Clear to auscultation bilaterally; No wheezes, rales or rhonchi  HEART: Regular rate and rhythm; No murmurs, rubs, or gallops  ABDOMEN: Soft, Nontender, Nondistended; Bowel sounds present  EXTREMITIES: LE pulses non-palpable, ext warm, non-tender to palpation. +Passive ROM. Active ROM limited  PSYCH: AAOx3  NEUROLOGY: non-focal  SKIN: No rashes or lesions    LABS:                        7.6    6.23  )-----------( 199      ( 20 May 2017 06:56 )             25.6     05-20    140  |  103  |  34<H>  ----------------------------<  109<H>  3.8   |  22  |  1.56<H>    Ca    9.1      20 May 2017 06:56  Phos  3.7     05-20  Mg     2.0     05-20                RADIOLOGY & ADDITIONAL TESTS:    Imaging Personally Reviewed:    Consultant(s) Notes Reviewed:      Care Discussed with Consultants/Other Providers:

## 2017-05-21 LAB
BUN SERPL-MCNC: 35 MG/DL — HIGH (ref 7–23)
CALCIUM SERPL-MCNC: 9 MG/DL — SIGNIFICANT CHANGE UP (ref 8.4–10.5)
CHLORIDE SERPL-SCNC: 103 MMOL/L — SIGNIFICANT CHANGE UP (ref 98–107)
CO2 SERPL-SCNC: 26 MMOL/L — SIGNIFICANT CHANGE UP (ref 22–31)
CREAT SERPL-MCNC: 1.38 MG/DL — HIGH (ref 0.5–1.3)
GLUCOSE SERPL-MCNC: 110 MG/DL — HIGH (ref 70–99)
HCT VFR BLD CALC: 27.4 % — LOW (ref 34.5–45)
HGB BLD-MCNC: 8.3 G/DL — LOW (ref 11.5–15.5)
MAGNESIUM SERPL-MCNC: 2 MG/DL — SIGNIFICANT CHANGE UP (ref 1.6–2.6)
MCHC RBC-ENTMCNC: 25.8 PG — LOW (ref 27–34)
MCHC RBC-ENTMCNC: 30.3 % — LOW (ref 32–36)
MCV RBC AUTO: 85.1 FL — SIGNIFICANT CHANGE UP (ref 80–100)
PHOSPHATE SERPL-MCNC: 3.6 MG/DL — SIGNIFICANT CHANGE UP (ref 2.5–4.5)
PLATELET # BLD AUTO: 201 K/UL — SIGNIFICANT CHANGE UP (ref 150–400)
PMV BLD: 10.1 FL — SIGNIFICANT CHANGE UP (ref 7–13)
POTASSIUM SERPL-MCNC: 4 MMOL/L — SIGNIFICANT CHANGE UP (ref 3.5–5.3)
POTASSIUM SERPL-SCNC: 4 MMOL/L — SIGNIFICANT CHANGE UP (ref 3.5–5.3)
RBC # BLD: 3.22 M/UL — LOW (ref 3.8–5.2)
RBC # FLD: 20.1 % — HIGH (ref 10.3–14.5)
SODIUM SERPL-SCNC: 140 MMOL/L — SIGNIFICANT CHANGE UP (ref 135–145)
WBC # BLD: 5.88 K/UL — SIGNIFICANT CHANGE UP (ref 3.8–10.5)
WBC # FLD AUTO: 5.88 K/UL — SIGNIFICANT CHANGE UP (ref 3.8–10.5)

## 2017-05-21 RX ORDER — DOCUSATE SODIUM 100 MG
100 CAPSULE ORAL
Qty: 0 | Refills: 0 | Status: DISCONTINUED | OUTPATIENT
Start: 2017-05-21 | End: 2017-05-23

## 2017-05-21 RX ADMIN — GABAPENTIN 300 MILLIGRAM(S): 400 CAPSULE ORAL at 11:38

## 2017-05-21 RX ADMIN — Medication 1 MILLIGRAM(S): at 21:23

## 2017-05-21 RX ADMIN — Medication 40 MILLIGRAM(S): at 05:40

## 2017-05-21 RX ADMIN — OXYCODONE HYDROCHLORIDE 10 MILLIGRAM(S): 5 TABLET ORAL at 11:38

## 2017-05-21 RX ADMIN — CLOPIDOGREL BISULFATE 75 MILLIGRAM(S): 75 TABLET, FILM COATED ORAL at 11:38

## 2017-05-21 RX ADMIN — PANTOPRAZOLE SODIUM 40 MILLIGRAM(S): 20 TABLET, DELAYED RELEASE ORAL at 06:03

## 2017-05-21 RX ADMIN — HEPARIN SODIUM 5000 UNIT(S): 5000 INJECTION INTRAVENOUS; SUBCUTANEOUS at 17:32

## 2017-05-21 RX ADMIN — ATORVASTATIN CALCIUM 80 MILLIGRAM(S): 80 TABLET, FILM COATED ORAL at 21:23

## 2017-05-21 RX ADMIN — OXYCODONE HYDROCHLORIDE 10 MILLIGRAM(S): 5 TABLET ORAL at 12:20

## 2017-05-21 RX ADMIN — LOSARTAN POTASSIUM 100 MILLIGRAM(S): 100 TABLET, FILM COATED ORAL at 05:40

## 2017-05-21 RX ADMIN — ISOSORBIDE MONONITRATE 60 MILLIGRAM(S): 60 TABLET, EXTENDED RELEASE ORAL at 14:27

## 2017-05-21 RX ADMIN — HEPARIN SODIUM 5000 UNIT(S): 5000 INJECTION INTRAVENOUS; SUBCUTANEOUS at 05:40

## 2017-05-21 RX ADMIN — Medication 81 MILLIGRAM(S): at 11:38

## 2017-05-21 NOTE — PROGRESS NOTE ADULT - SUBJECTIVE AND OBJECTIVE BOX
Patient is a 85y old  Female who presents with a chief complaint of Sent from Urgent Care for anemia (Hb 5.5) (19 May 2017 13:23)      SUBJECTIVE / OVERNIGHT EVENTS: No acute events overnight. Endorses more movement of LLE (with pain), attempted to walk yesterday but was limited by pain. Was able to move to chair with assistance yesterday. Hb stable. Will attempt to walk patient again today.    MEDICATIONS  (STANDING):  doxazosin 1milliGRAM(s) Oral at bedtime  gabapentin 300milliGRAM(s) Oral daily  atorvastatin 80milliGRAM(s) Oral at bedtime  isosorbide   mononitrate ER Tablet (IMDUR) 60milliGRAM(s) Oral daily  losartan 100milliGRAM(s) Oral daily  furosemide    Tablet 40milliGRAM(s) Oral daily  heparin  Injectable 5000Unit(s) SubCutaneous every 12 hours  aspirin enteric coated 81milliGRAM(s) Oral daily  clopidogrel Tablet 75milliGRAM(s) Oral daily  pantoprazole    Tablet 40milliGRAM(s) Oral before breakfast  sodium chloride 0.9%. 1000milliLiter(s) IV Continuous <Continuous>    MEDICATIONS  (PRN):  acetaminophen   Tablet. 650milliGRAM(s) Oral every 6 hours PRN Moderate Pain (4 - 6)  oxyCODONE IR 10milliGRAM(s) Oral every 6 hours PRN Severe Pain (7 - 10)  oxyCODONE IR 5milliGRAM(s) Oral every 6 hours PRN Moderate Pain (4 - 6)      Vital Signs Last 24 Hrs  T(C): 36.7, Max: 36.9 (05-20 @ 21:08)  HR: 71 (65 - 71)  BP: 153/64 (133/61 - 153/64)  RR: 18 (18 - 18)  SpO2: 97% (97% - 98%)      PHYSICAL EXAM:  GENERAL: NAD, well-developed  HEAD:  Atraumatic, Normocephalic  EYES: EOMI, PERRLA, conjunctiva and sclera clear  NECK: Supple, No JVD  CHEST/LUNG: Clear to auscultation bilaterally; No wheezes, rales or rhonchi  HEART: Regular rate and rhythm; No murmurs, rubs, or gallops  ABDOMEN: Soft, Nontender, Nondistended; Bowel sounds present  EXTREMITIES:  Non-palpable distal pulses, ext warm, mobile, full passive ROM, limited active ROM 2/2 pain, No clubbing, cyanosis, or edema  PSYCH: AAOx3  NEUROLOGY: non-focal  SKIN: No rashes or lesions    LABS:                        8.3    5.88  )-----------( 201      ( 21 May 2017 07:05 )             27.4     05-21    140  |  103  |  35<H>  ----------------------------<  110<H>  4.0   |  26  |  1.38<H>    Ca    9.0      21 May 2017 07:05  Phos  3.6     05-21  Mg     2.0     05-21                RADIOLOGY & ADDITIONAL TESTS:    Imaging Personally Reviewed:    Consultant(s) Notes Reviewed:      Care Discussed with Consultants/Other Providers:

## 2017-05-22 LAB
BASOPHILS # BLD AUTO: 0.03 K/UL — SIGNIFICANT CHANGE UP (ref 0–0.2)
BASOPHILS NFR BLD AUTO: 0.6 % — SIGNIFICANT CHANGE UP (ref 0–2)
BUN SERPL-MCNC: 36 MG/DL — HIGH (ref 7–23)
CALCIUM SERPL-MCNC: 9.7 MG/DL — SIGNIFICANT CHANGE UP (ref 8.4–10.5)
CHLORIDE SERPL-SCNC: 105 MMOL/L — SIGNIFICANT CHANGE UP (ref 98–107)
CO2 SERPL-SCNC: 21 MMOL/L — LOW (ref 22–31)
CREAT SERPL-MCNC: 1.4 MG/DL — HIGH (ref 0.5–1.3)
EOSINOPHIL # BLD AUTO: 0.26 K/UL — SIGNIFICANT CHANGE UP (ref 0–0.5)
EOSINOPHIL NFR BLD AUTO: 5 % — SIGNIFICANT CHANGE UP (ref 0–6)
GLUCOSE SERPL-MCNC: 95 MG/DL — SIGNIFICANT CHANGE UP (ref 70–99)
HCT VFR BLD CALC: 27.9 % — LOW (ref 34.5–45)
HGB BLD-MCNC: 8.2 G/DL — LOW (ref 11.5–15.5)
IMM GRANULOCYTES NFR BLD AUTO: 0.2 % — SIGNIFICANT CHANGE UP (ref 0–1.5)
LYMPHOCYTES # BLD AUTO: 0.89 K/UL — LOW (ref 1–3.3)
LYMPHOCYTES # BLD AUTO: 17.1 % — SIGNIFICANT CHANGE UP (ref 13–44)
MAGNESIUM SERPL-MCNC: 2.1 MG/DL — SIGNIFICANT CHANGE UP (ref 1.6–2.6)
MCHC RBC-ENTMCNC: 25 PG — LOW (ref 27–34)
MCHC RBC-ENTMCNC: 29.4 % — LOW (ref 32–36)
MCV RBC AUTO: 85.1 FL — SIGNIFICANT CHANGE UP (ref 80–100)
MONOCYTES # BLD AUTO: 0.62 K/UL — SIGNIFICANT CHANGE UP (ref 0–0.9)
MONOCYTES NFR BLD AUTO: 11.9 % — SIGNIFICANT CHANGE UP (ref 2–14)
NEUTROPHILS # BLD AUTO: 3.4 K/UL — SIGNIFICANT CHANGE UP (ref 1.8–7.4)
NEUTROPHILS NFR BLD AUTO: 65.2 % — SIGNIFICANT CHANGE UP (ref 43–77)
PHOSPHATE SERPL-MCNC: 3.8 MG/DL — SIGNIFICANT CHANGE UP (ref 2.5–4.5)
PLATELET # BLD AUTO: 230 K/UL — SIGNIFICANT CHANGE UP (ref 150–400)
PMV BLD: 10.1 FL — SIGNIFICANT CHANGE UP (ref 7–13)
POTASSIUM SERPL-MCNC: 4.4 MMOL/L — SIGNIFICANT CHANGE UP (ref 3.5–5.3)
POTASSIUM SERPL-SCNC: 4.4 MMOL/L — SIGNIFICANT CHANGE UP (ref 3.5–5.3)
RBC # BLD: 3.28 M/UL — LOW (ref 3.8–5.2)
RBC # FLD: 20.6 % — HIGH (ref 10.3–14.5)
SODIUM SERPL-SCNC: 142 MMOL/L — SIGNIFICANT CHANGE UP (ref 135–145)
WBC # BLD: 5.21 K/UL — SIGNIFICANT CHANGE UP (ref 3.8–10.5)
WBC # FLD AUTO: 5.21 K/UL — SIGNIFICANT CHANGE UP (ref 3.8–10.5)

## 2017-05-22 RX ADMIN — Medication 100 MILLIGRAM(S): at 05:34

## 2017-05-22 RX ADMIN — ATORVASTATIN CALCIUM 80 MILLIGRAM(S): 80 TABLET, FILM COATED ORAL at 21:36

## 2017-05-22 RX ADMIN — ISOSORBIDE MONONITRATE 60 MILLIGRAM(S): 60 TABLET, EXTENDED RELEASE ORAL at 12:08

## 2017-05-22 RX ADMIN — GABAPENTIN 300 MILLIGRAM(S): 400 CAPSULE ORAL at 12:08

## 2017-05-22 RX ADMIN — Medication 81 MILLIGRAM(S): at 12:08

## 2017-05-22 RX ADMIN — HEPARIN SODIUM 5000 UNIT(S): 5000 INJECTION INTRAVENOUS; SUBCUTANEOUS at 05:35

## 2017-05-22 RX ADMIN — Medication 100 MILLIGRAM(S): at 18:10

## 2017-05-22 RX ADMIN — HEPARIN SODIUM 5000 UNIT(S): 5000 INJECTION INTRAVENOUS; SUBCUTANEOUS at 18:10

## 2017-05-22 RX ADMIN — Medication 1 MILLIGRAM(S): at 21:35

## 2017-05-22 RX ADMIN — PANTOPRAZOLE SODIUM 40 MILLIGRAM(S): 20 TABLET, DELAYED RELEASE ORAL at 05:34

## 2017-05-22 RX ADMIN — Medication 40 MILLIGRAM(S): at 05:34

## 2017-05-22 RX ADMIN — LOSARTAN POTASSIUM 100 MILLIGRAM(S): 100 TABLET, FILM COATED ORAL at 05:35

## 2017-05-22 RX ADMIN — CLOPIDOGREL BISULFATE 75 MILLIGRAM(S): 75 TABLET, FILM COATED ORAL at 12:08

## 2017-05-22 NOTE — PROGRESS NOTE ADULT - ASSESSMENT
85 year old F with PVD c/b GIB 12/2015, PVD, CVA with no deficits on DAPT, arthritis, HTN presented initially for Hb 5.5 s/p 2u pRBC, hemodynamically stable. Now s/p EGD/colonoscopy showing non-bleeding ulcer, no biopsy taken, +anal fissure and +diverticulosis. Stable Hb; will require 1-2 week GI follow-up. Patient had increased left foot pain 5/16/17 found to have severe PAD (not acute), s/p [facility] Duplex, CTA LLE and Vascular surgery consult. Pending inpatient rehab, will receive angiogram as an inpatient v. outpatient pending timing of rehab approval    #Left foot PVD  -s/p [facility] Duplex showing severe PVD/PAD  -Vascular Surgery recommends angiogram for LLE; will plan for outpatient angiogram, but if patient is still hospitalized next week, will attempt to get exam done inpatient   -Pain control with Tylenol and Oxycodone IR  -Follow-up Vascular Surgery recommendations today for timing of angiogram    #Anemia of unclear etiology - Resolved  -s/p 2u pRBC on admission with stable H/H since  -s/p EGD/colonoscopy (limited views); EGD showed a non-bleeding ulcer (rec: consider capsule study)///colonoscopy showed anal fissure and diverticulosis (recs: consider capsule study, exam could not r/o malignancy will need to follow-up with GI in 1-2 weeks as outpatient)  -c/w PO Protonix  -Monitor daily CBC    #HTN -stable  -c/w Cozaar 100mg, Cardura 1mg, Imdur 60mg, Lasix 40mg    #Hx CVA -stable  -continue with home Aspirin and Plavix    #VTE ppx  -SQH    #Dispo - PT recommends inpatient rehabilitation facility; will need Angiogram (Outpatient v. Inpatient) per Vascular Surgery and outpatient GI follow-up in 1-2 weeks

## 2017-05-22 NOTE — PROGRESS NOTE ADULT - SUBJECTIVE AND OBJECTIVE BOX
Patient is a 85y old  Female who presents with a chief complaint of Sent from Urgent Care for anemia (Hb 5.5) (19 May 2017 13:23)      SUBJECTIVE / OVERNIGHT EVENTS: No acute events overnight. Endorses stable movement of LLE (with pain), attempted to walk yesterday but was again limited by pain. Was able to move to chair with assistance. Hb stable. Will attempt to walk patient again today with better pain control. Pending rehab placement.    MEDICATIONS  (STANDING):    doxazosin 1milliGRAM(s) Oral at bedtime  gabapentin 300milliGRAM(s) Oral daily  atorvastatin 80milliGRAM(s) Oral at bedtime  isosorbide   mononitrate ER Tablet (IMDUR) 60milliGRAM(s) Oral daily  losartan 100milliGRAM(s) Oral daily  furosemide    Tablet 40milliGRAM(s) Oral daily  heparin  Injectable 5000Unit(s) SubCutaneous every 12 hours  aspirin enteric coated 81milliGRAM(s) Oral daily  clopidogrel Tablet 75milliGRAM(s) Oral daily  pantoprazole    Tablet 40milliGRAM(s) Oral before breakfast  sodium chloride 0.9%. 1000milliLiter(s) IV Continuous <Continuous>  docusate sodium 100milliGRAM(s) Oral two times a day    MEDICATIONS  (PRN):    acetaminophen   Tablet. 650milliGRAM(s) Oral every 6 hours PRN Moderate Pain (4 - 6)  oxyCODONE IR 10milliGRAM(s) Oral every 6 hours PRN Severe Pain (7 - 10)  oxyCODONE IR 5milliGRAM(s) Oral every 6 hours PRN Moderate Pain (4 - 6)      Vital Signs Last 24 Hrs  T(C): 36.8, Max: 37 (05-21 @ 21:48)  HR: 73 (66 - 78)  BP: 149/63 (133/56 - 159/62)  RR: 17 (17 - 18)  SpO2: 98% (96% - 98%)  Wt(kg): --  CAPILLARY BLOOD GLUCOSE    I&O's Summary    I & Os for current day (as of 22 May 2017 09:09)  =============================================  IN: 750 ml / OUT: 1000 ml / NET: -250 ml      PHYSICAL EXAM:  GENERAL: NAD, well-developed  HEAD:  Atraumatic, Normocephalic  EYES: EOMI, PERRLA, conjunctiva and sclera clear  NECK: Supple, No JVD  CHEST/LUNG: Clear to auscultation bilaterally; No wheezes, rales or rhonchi  HEART: Regular rate and rhythm; No murmurs, rubs, or gallops  ABDOMEN: Soft, Nontender, Nondistended; Bowel sounds present  EXTREMITIES:  Non-palpable distal pulses, extremities are warm, mobile, limited passive ROM 2/2 pain with minimal movement, No clubbing, cyanosis, or edema  PSYCH: AAOx3  NEUROLOGY: non-focal  SKIN: No rashes or lesions    LABS:                        8.2    5.21  )-----------( 230      ( 22 May 2017 06:00 )             27.9     05-22    142  |  105  |  36<H>  ----------------------------<  95  4.4   |  21<L>  |  1.40<H>    Ca    9.7      22 May 2017 06:00  Phos  3.8     05-22  Mg     2.1     05-22                RADIOLOGY & ADDITIONAL TESTS:    Imaging Personally Reviewed:    Consultant(s) Notes Reviewed:      Care Discussed with Consultants/Other Providers:

## 2017-05-23 VITALS
DIASTOLIC BLOOD PRESSURE: 58 MMHG | HEART RATE: 68 BPM | RESPIRATION RATE: 18 BRPM | OXYGEN SATURATION: 98 % | SYSTOLIC BLOOD PRESSURE: 172 MMHG

## 2017-05-23 LAB
BASOPHILS # BLD AUTO: 0.05 K/UL — SIGNIFICANT CHANGE UP (ref 0–0.2)
BASOPHILS NFR BLD AUTO: 1 % — SIGNIFICANT CHANGE UP (ref 0–2)
BUN SERPL-MCNC: 32 MG/DL — HIGH (ref 7–23)
CALCIUM SERPL-MCNC: 9.9 MG/DL — SIGNIFICANT CHANGE UP (ref 8.4–10.5)
CHLORIDE SERPL-SCNC: 100 MMOL/L — SIGNIFICANT CHANGE UP (ref 98–107)
CO2 SERPL-SCNC: 24 MMOL/L — SIGNIFICANT CHANGE UP (ref 22–31)
CREAT SERPL-MCNC: 1.17 MG/DL — SIGNIFICANT CHANGE UP (ref 0.5–1.3)
EOSINOPHIL # BLD AUTO: 0.29 K/UL — SIGNIFICANT CHANGE UP (ref 0–0.5)
EOSINOPHIL NFR BLD AUTO: 5.7 % — SIGNIFICANT CHANGE UP (ref 0–6)
GLUCOSE SERPL-MCNC: 110 MG/DL — HIGH (ref 70–99)
HCT VFR BLD CALC: 26.4 % — LOW (ref 34.5–45)
HGB BLD-MCNC: 7.9 G/DL — LOW (ref 11.5–15.5)
IMM GRANULOCYTES NFR BLD AUTO: 0.2 % — SIGNIFICANT CHANGE UP (ref 0–1.5)
LYMPHOCYTES # BLD AUTO: 0.86 K/UL — LOW (ref 1–3.3)
LYMPHOCYTES # BLD AUTO: 16.9 % — SIGNIFICANT CHANGE UP (ref 13–44)
MAGNESIUM SERPL-MCNC: 2.1 MG/DL — SIGNIFICANT CHANGE UP (ref 1.6–2.6)
MCHC RBC-ENTMCNC: 25.2 PG — LOW (ref 27–34)
MCHC RBC-ENTMCNC: 29.9 % — LOW (ref 32–36)
MCV RBC AUTO: 84.3 FL — SIGNIFICANT CHANGE UP (ref 80–100)
MONOCYTES # BLD AUTO: 0.46 K/UL — SIGNIFICANT CHANGE UP (ref 0–0.9)
MONOCYTES NFR BLD AUTO: 9 % — SIGNIFICANT CHANGE UP (ref 2–14)
NEUTROPHILS # BLD AUTO: 3.43 K/UL — SIGNIFICANT CHANGE UP (ref 1.8–7.4)
NEUTROPHILS NFR BLD AUTO: 67.2 % — SIGNIFICANT CHANGE UP (ref 43–77)
PHOSPHATE SERPL-MCNC: 3.4 MG/DL — SIGNIFICANT CHANGE UP (ref 2.5–4.5)
PLATELET # BLD AUTO: 252 K/UL — SIGNIFICANT CHANGE UP (ref 150–400)
PMV BLD: 9.5 FL — SIGNIFICANT CHANGE UP (ref 7–13)
POTASSIUM SERPL-MCNC: 4 MMOL/L — SIGNIFICANT CHANGE UP (ref 3.5–5.3)
POTASSIUM SERPL-SCNC: 4 MMOL/L — SIGNIFICANT CHANGE UP (ref 3.5–5.3)
RBC # BLD: 3.13 M/UL — LOW (ref 3.8–5.2)
RBC # FLD: 20.6 % — HIGH (ref 10.3–14.5)
SODIUM SERPL-SCNC: 139 MMOL/L — SIGNIFICANT CHANGE UP (ref 135–145)
WBC # BLD: 5.1 K/UL — SIGNIFICANT CHANGE UP (ref 3.8–10.5)
WBC # FLD AUTO: 5.1 K/UL — SIGNIFICANT CHANGE UP (ref 3.8–10.5)

## 2017-05-23 RX ADMIN — Medication 40 MILLIGRAM(S): at 05:24

## 2017-05-23 RX ADMIN — HEPARIN SODIUM 5000 UNIT(S): 5000 INJECTION INTRAVENOUS; SUBCUTANEOUS at 05:24

## 2017-05-23 RX ADMIN — PANTOPRAZOLE SODIUM 40 MILLIGRAM(S): 20 TABLET, DELAYED RELEASE ORAL at 07:16

## 2017-05-23 RX ADMIN — CLOPIDOGREL BISULFATE 75 MILLIGRAM(S): 75 TABLET, FILM COATED ORAL at 11:36

## 2017-05-23 RX ADMIN — Medication 100 MILLIGRAM(S): at 05:24

## 2017-05-23 RX ADMIN — LOSARTAN POTASSIUM 100 MILLIGRAM(S): 100 TABLET, FILM COATED ORAL at 05:24

## 2017-05-23 RX ADMIN — GABAPENTIN 300 MILLIGRAM(S): 400 CAPSULE ORAL at 11:36

## 2017-05-23 RX ADMIN — ISOSORBIDE MONONITRATE 60 MILLIGRAM(S): 60 TABLET, EXTENDED RELEASE ORAL at 11:41

## 2017-05-23 RX ADMIN — Medication 81 MILLIGRAM(S): at 11:36

## 2017-05-23 NOTE — PROGRESS NOTE ADULT - NSHPATTENDINGPLANDISCUSS_GEN_ALL_CORE
Hs team, pt and 
patient and 
patient, spouse, HS team
hs team, patient
patient and HS team
patient, spouse HS team

## 2017-05-23 NOTE — PROVIDER CONTACT NOTE (OTHER) - ASSESSMENT
asyptomatic, 177/49
Blood pressure: 185/64; No acute distress noted.
Patient BP was 170/68, Patient was alert no physical symptoms noted
Pt BP is 174/75 mmhg
patient asymptomatic due for lasix and losartan

## 2017-05-23 NOTE — PROGRESS NOTE ADULT - SUBJECTIVE AND OBJECTIVE BOX
Patient is a 85y old  Female who presents with a chief complaint of Sent from Urgent Care for anemia (Hb 5.5) (19 May 2017 13:23)      SUBJECTIVE / OVERNIGHT EVENTS: No acute events overnight. Endorses stable movement of LLE (with consistent pain), attempted to walk yesterday but was again limited by pain.  Hb stable. Will attempt to walk patient again today. Pending rehab acceptance. Medically stable for rehab      MEDICATIONS  (STANDING):  doxazosin 1milliGRAM(s) Oral at bedtime  gabapentin 300milliGRAM(s) Oral daily  atorvastatin 80milliGRAM(s) Oral at bedtime  isosorbide   mononitrate ER Tablet (IMDUR) 60milliGRAM(s) Oral daily  losartan 100milliGRAM(s) Oral daily  furosemide    Tablet 40milliGRAM(s) Oral daily  heparin  Injectable 5000Unit(s) SubCutaneous every 12 hours  aspirin enteric coated 81milliGRAM(s) Oral daily  clopidogrel Tablet 75milliGRAM(s) Oral daily  pantoprazole    Tablet 40milliGRAM(s) Oral before breakfast  sodium chloride 0.9%. 1000milliLiter(s) IV Continuous <Continuous>  docusate sodium 100milliGRAM(s) Oral two times a day    MEDICATIONS  (PRN):  acetaminophen   Tablet. 650milliGRAM(s) Oral every 6 hours PRN Moderate Pain (4 - 6)  oxyCODONE IR 10milliGRAM(s) Oral every 6 hours PRN Severe Pain (7 - 10)  oxyCODONE IR 5milliGRAM(s) Oral every 6 hours PRN Moderate Pain (4 - 6)      Vital Signs Last 24 Hrs  T(C): 37.2, Max: 37.2 (05-23 @ 05:18)  HR: 64 (63 - 71)  BP: 170/75 (136/55 - 180/61)  RR: 18 (16 - 18)  SpO2: 98% (98% - 98%)      PHYSICAL EXAM:  GENERAL: NAD, well-developed  HEAD:  Atraumatic, Normocephalic  EYES: EOMI, PERRLA, conjunctiva and sclera clear  NECK: Supple, No JVD  CHEST/LUNG: Clear to auscultation bilaterally; No wheezes, rales or rhonchi  HEART: Regular rate and rhythm; No murmurs, rubs, or gallops  ABDOMEN: Soft, Nontender, Nondistended; Bowel sounds present  EXTREMITIES:  Non-palpable distal pulses, extremities are warm, mobile, limited passive ROM 2/2 pain with minimal movement, No clubbing, cyanosis, or edema  PSYCH: AAOx3  NEUROLOGY: non-focal  SKIN: No rashes or lesions    LABS:                        7.9    5.10  )-----------( 252      ( 23 May 2017 06:15 )             26.4     05-23    139  |  100  |  32<H>  ----------------------------<  110<H>  4.0   |  24  |  1.17    Ca    9.9      23 May 2017 06:15  Phos  3.4     05-23  Mg     2.1     05-23                RADIOLOGY & ADDITIONAL TESTS:    Imaging Personally Reviewed:    Consultant(s) Notes Reviewed:      Care Discussed with Consultants/Other Providers:

## 2017-05-23 NOTE — PROGRESS NOTE ADULT - ASSESSMENT
85 year old F with PVD c/b GIB 12/2015, PVD, CVA with no deficits on DAPT, arthritis, HTN presented initially for Hb 5.5 s/p 2u pRBC, hemodynamically stable. Now s/p EGD/colonoscopy showing non-bleeding ulcer, no biopsy taken, +anal fissure and +diverticulosis. Stable Hb; will require 1-2 week GI follow-up. Patient had increased left foot pain 5/16/17 found to have severe PAD (not acute), s/p [facility] Duplex, CTA LLE and Vascular surgery consult. Pending inpatient rehab placement, will receive angiogram as an outpatient with Dr. Quintana    #Left foot PVD  -s/p VA Duplex showing severe PVD/PAD  -Vascular Surgery recommends angiogram for LLE; will plan for outpatient angiogram with Dr. Quintana  -Pain control with Tylenol and Oxycodone IR    #Anemia of unclear etiology - Resolved  -s/p 2u pRBC on admission with stable H/H since  -s/p EGD/colonoscopy (limited views); EGD showed a non-bleeding ulcer (rec: consider capsule study)///colonoscopy showed anal fissure and diverticulosis (recs: consider capsule study, exam could not r/o malignancy will need to follow-up with GI in 1-2 weeks as outpatient)  -c/w PO Protonix  -Monitor daily CBC    #HTN  -c/w home Cozaar 100mg, Cardura 1mg, Imdur 60mg, Lasix 40mg    #Hx CVA -stable  -continue with home Aspirin and Plavix    #VTE ppx  -SQH    #Dispo - Inpatient rehab pending approval; patient is medically stable for discharge

## 2017-05-23 NOTE — PROVIDER CONTACT NOTE (OTHER) - BACKGROUND
Pt. with HTN
Pt. with HTN
Patient admitted for anemia
Patient came in with anemia. she is on isosorbide for hypertension.
Pt was admitted for anemia
patient dx w anemia, hx of acute stomach ulcer, gastric ulcer, TIA, HTN, GI bleed, and peripheral vascular disease

## 2017-05-23 NOTE — PROGRESS NOTE ADULT - PROVIDER SPECIALTY LIST ADULT
Internal Medicine
Vascular Surgery
Vascular Surgery
Internal Medicine

## 2017-05-23 NOTE — PROVIDER CONTACT NOTE (OTHER) - ACTION/TREATMENT ORDERED:
administered Imdur as ordered
Administer am antihypertensive meds
Blood pressure will be rechecked within the hour after patient receives losartan.
Dr Gold aware, ordered to hold the bp medication and recheck BP in an hour. Nursing care to continue
give Isosorbide and monitor patient

## 2017-05-23 NOTE — PROVIDER CONTACT NOTE (OTHER) - RECOMMENDATIONS
administer Imdur as ordered
Administer am antihypertensive meds
Give patient losartan and recheck blood pressure within the hour.
Hold medication
monitor patient

## 2017-05-23 NOTE — PROGRESS NOTE ADULT - ATTENDING COMMENTS
pt seen and examined agree with above detailed progress note.  labs reviewd  #GIB resolved ,stable h/h to moniter  #PAD with foot pain likely ischemic origin. given acute on chronic pain  Ct angio reviewed, vascular f/u noted, no evidence of critical limb ischemia or threatened limb. Given CT findings will likely need angiogram inpt vs outpt  asa and plavix restarted  f/u vasc rec  dc planing to rehab in progress
pt seen and examined, plan d/w team, agree with above detailed progress note  #blood loss anemia d/t gib- stable  #Foot pain d/t chr worsening PAD: Vascular plan angiogram outpt in 2wks, pain control, PT  plan to dc pt to rehab
pt seen and exmained , agree with above progress note  85 year old F with PVD c/b GIB 12/2015, PVD, CVA with no deficits on DAPT, arthritis, HTN presented initially for Hb 5.5 s/p 2u pRBC, s/p EGD/colonoscopy showing non-bleeding ulcer, no biopsy taken, +anal fissure and +diverticulosis. Stable Hb; will require 1-2 week GI follow-up.     #Left foot PAD  -s/p VA Duplex showing severe /PAD  -Vascular Surgery recommending outpt angiogram for LLE, but if patient is still hospitalized next week, will attempt to get exam done inpatient, Pain control with Tylenol  -Follow-up Vascular Surgery recommendations
pt seen and examined , agree with above detailed progress note  #Foot pain: likely arterial insufficiency  reviewed arterial dopplers and rashawn/pvr results  CTA leg f/u results  to f/u vasc sx recs  pain control  #[id number] s/p egd/cscope
pt seen and examined and plan d/w HS team, agree with above progress note  #GIB resolved ,stable h/h  #PAD with foot pain likely ischemic origin. given acute on chronic pain  Ct angio reviewed, vascular f/u noted, no evidence of critical limb ischemia or threatened limb. Given CT findings will likely proceed with angiogram next week   asa and plavix restarted  d/w patient and  at length and addressed all their concerns who wish to speak to vascular surgeon. spoke to HS team to notify vasc sx
pt seen and examined. agree with above detailed progress notes
pt seen and examined and plan dw HS team.  agree with above progress note  #GIB-Anemia; stable, gi f/u  #PVD-foot pain f/u vascular-re; ct angio leg and furthur mgmt  pt eval and dc planing

## 2017-08-03 ENCOUNTER — INPATIENT (INPATIENT)
Facility: HOSPITAL | Age: 82
LOS: 7 days | Discharge: NURSING, REHAB OR LONG-TERM CARE | DRG: 811 | End: 2017-08-11
Attending: INTERNAL MEDICINE | Admitting: INTERNAL MEDICINE
Payer: COMMERCIAL

## 2017-08-03 VITALS — DIASTOLIC BLOOD PRESSURE: 62 MMHG | SYSTOLIC BLOOD PRESSURE: 155 MMHG | RESPIRATION RATE: 18 BRPM | HEART RATE: 78 BPM

## 2017-08-03 DIAGNOSIS — Z98.89 OTHER SPECIFIED POSTPROCEDURAL STATES: Chronic | ICD-10-CM

## 2017-08-03 LAB
ALBUMIN SERPL ELPH-MCNC: 3.8 G/DL — SIGNIFICANT CHANGE UP (ref 3.3–5)
ALP SERPL-CCNC: 266 U/L — HIGH (ref 40–120)
ALT FLD-CCNC: 37 U/L RC — SIGNIFICANT CHANGE UP (ref 10–45)
ANION GAP SERPL CALC-SCNC: 16 MMOL/L — SIGNIFICANT CHANGE UP (ref 5–17)
ANISOCYTOSIS BLD QL: SIGNIFICANT CHANGE UP
APTT BLD: 27.9 SEC — SIGNIFICANT CHANGE UP (ref 27.5–37.4)
AST SERPL-CCNC: 45 U/L — HIGH (ref 10–40)
BASE EXCESS BLDV CALC-SCNC: -3.3 MMOL/L — LOW (ref -2–2)
BASOPHILS # BLD AUTO: 0 K/UL — SIGNIFICANT CHANGE UP (ref 0–0.2)
BASOPHILS NFR BLD AUTO: 0.4 % — SIGNIFICANT CHANGE UP (ref 0–2)
BILIRUB SERPL-MCNC: 0.3 MG/DL — SIGNIFICANT CHANGE UP (ref 0.2–1.2)
BUN SERPL-MCNC: 28 MG/DL — HIGH (ref 7–23)
CA-I SERPL-SCNC: 1.22 MMOL/L — SIGNIFICANT CHANGE UP (ref 1.12–1.3)
CALCIUM SERPL-MCNC: 8.8 MG/DL — SIGNIFICANT CHANGE UP (ref 8.4–10.5)
CHLORIDE BLDV-SCNC: 107 MMOL/L — SIGNIFICANT CHANGE UP (ref 96–108)
CHLORIDE SERPL-SCNC: 103 MMOL/L — SIGNIFICANT CHANGE UP (ref 96–108)
CK SERPL-CCNC: 49 U/L — SIGNIFICANT CHANGE UP (ref 25–170)
CO2 BLDV-SCNC: 24 MMOL/L — SIGNIFICANT CHANGE UP (ref 22–30)
CO2 SERPL-SCNC: 20 MMOL/L — LOW (ref 22–31)
CREAT SERPL-MCNC: 1.31 MG/DL — HIGH (ref 0.5–1.3)
DACRYOCYTES BLD QL SMEAR: SLIGHT — SIGNIFICANT CHANGE UP
ELLIPTOCYTES BLD QL SMEAR: SLIGHT — SIGNIFICANT CHANGE UP
EOSINOPHIL # BLD AUTO: 0 K/UL — SIGNIFICANT CHANGE UP (ref 0–0.5)
EOSINOPHIL NFR BLD AUTO: 0.7 % — SIGNIFICANT CHANGE UP (ref 0–6)
GAS PNL BLDV: 138 MMOL/L — SIGNIFICANT CHANGE UP (ref 136–145)
GAS PNL BLDV: SIGNIFICANT CHANGE UP
GAS PNL BLDV: SIGNIFICANT CHANGE UP
GLUCOSE BLDV-MCNC: 125 MG/DL — HIGH (ref 70–99)
GLUCOSE SERPL-MCNC: 130 MG/DL — HIGH (ref 70–99)
HCO3 BLDV-SCNC: 22 MMOL/L — SIGNIFICANT CHANGE UP (ref 21–29)
HCT VFR BLD CALC: 21.6 % — LOW (ref 34.5–45)
HCT VFR BLDA CALC: 21 % — CRITICAL LOW (ref 39–50)
HGB BLD CALC-MCNC: 6.7 G/DL — CRITICAL LOW (ref 11.5–15.5)
HGB BLD-MCNC: 6.8 G/DL — CRITICAL LOW (ref 11.5–15.5)
HYPOCHROMIA BLD QL: SLIGHT — SIGNIFICANT CHANGE UP
INR BLD: 1.08 RATIO — SIGNIFICANT CHANGE UP (ref 0.88–1.16)
LACTATE BLDV-MCNC: 2.1 MMOL/L — HIGH (ref 0.7–2)
LIDOCAIN IGE QN: 41 U/L — SIGNIFICANT CHANGE UP (ref 7–60)
LYMPHOCYTES # BLD AUTO: 0.7 K/UL — LOW (ref 1–3.3)
LYMPHOCYTES # BLD AUTO: 9.7 % — LOW (ref 13–44)
MACROCYTES BLD QL: SLIGHT — SIGNIFICANT CHANGE UP
MCHC RBC-ENTMCNC: 27.8 PG — SIGNIFICANT CHANGE UP (ref 27–34)
MCHC RBC-ENTMCNC: 31.4 GM/DL — LOW (ref 32–36)
MCV RBC AUTO: 88.6 FL — SIGNIFICANT CHANGE UP (ref 80–100)
MICROCYTES BLD QL: SLIGHT — SIGNIFICANT CHANGE UP
MONOCYTES # BLD AUTO: 0.7 K/UL — SIGNIFICANT CHANGE UP (ref 0–0.9)
MONOCYTES NFR BLD AUTO: 9.4 % — SIGNIFICANT CHANGE UP (ref 2–14)
NEUTROPHILS # BLD AUTO: 6.1 K/UL — SIGNIFICANT CHANGE UP (ref 1.8–7.4)
NEUTROPHILS NFR BLD AUTO: 79.8 % — HIGH (ref 43–77)
NT-PROBNP SERPL-SCNC: 1156 PG/ML — HIGH (ref 0–300)
OVALOCYTES BLD QL SMEAR: SLIGHT — SIGNIFICANT CHANGE UP
PCO2 BLDV: 47 MMHG — SIGNIFICANT CHANGE UP (ref 35–50)
PH BLDV: 7.3 — LOW (ref 7.35–7.45)
PLAT MORPH BLD: NORMAL — SIGNIFICANT CHANGE UP
PLATELET # BLD AUTO: 215 K/UL — SIGNIFICANT CHANGE UP (ref 150–400)
PO2 BLDV: 20 MMHG — LOW (ref 25–45)
POLYCHROMASIA BLD QL SMEAR: SLIGHT — SIGNIFICANT CHANGE UP
POTASSIUM BLDV-SCNC: 4.5 MMOL/L — SIGNIFICANT CHANGE UP (ref 3.5–5)
POTASSIUM SERPL-MCNC: 4.9 MMOL/L — SIGNIFICANT CHANGE UP (ref 3.5–5.3)
POTASSIUM SERPL-SCNC: 4.9 MMOL/L — SIGNIFICANT CHANGE UP (ref 3.5–5.3)
PROT SERPL-MCNC: 6.7 G/DL — SIGNIFICANT CHANGE UP (ref 6–8.3)
PROTHROM AB SERPL-ACNC: 11.7 SEC — SIGNIFICANT CHANGE UP (ref 9.8–12.7)
RBC # BLD: 2.44 M/UL — LOW (ref 3.8–5.2)
RBC # FLD: 18.9 % — HIGH (ref 10.3–14.5)
RBC BLD AUTO: ABNORMAL
SAO2 % BLDV: 19 % — LOW (ref 67–88)
SODIUM SERPL-SCNC: 139 MMOL/L — SIGNIFICANT CHANGE UP (ref 135–145)
TARGETS BLD QL SMEAR: SLIGHT — SIGNIFICANT CHANGE UP
TROPONIN T SERPL-MCNC: <0.01 NG/ML — SIGNIFICANT CHANGE UP (ref 0–0.06)
WBC # BLD: 7.7 K/UL — SIGNIFICANT CHANGE UP (ref 3.8–10.5)
WBC # FLD AUTO: 7.7 K/UL — SIGNIFICANT CHANGE UP (ref 3.8–10.5)

## 2017-08-03 PROCEDURE — 71010: CPT | Mod: 26

## 2017-08-03 PROCEDURE — 93010 ELECTROCARDIOGRAM REPORT: CPT

## 2017-08-03 PROCEDURE — 99285 EMERGENCY DEPT VISIT HI MDM: CPT | Mod: 25

## 2017-08-03 PROCEDURE — 70450 CT HEAD/BRAIN W/O DYE: CPT | Mod: 26

## 2017-08-03 RX ORDER — SODIUM CHLORIDE 9 MG/ML
1000 INJECTION INTRAMUSCULAR; INTRAVENOUS; SUBCUTANEOUS
Qty: 0 | Refills: 0 | Status: DISCONTINUED | OUTPATIENT
Start: 2017-08-03 | End: 2017-08-04

## 2017-08-03 RX ORDER — SODIUM CHLORIDE 9 MG/ML
3 INJECTION INTRAMUSCULAR; INTRAVENOUS; SUBCUTANEOUS ONCE
Qty: 0 | Refills: 0 | Status: COMPLETED | OUTPATIENT
Start: 2017-08-03 | End: 2017-08-03

## 2017-08-03 RX ADMIN — SODIUM CHLORIDE 3 MILLILITER(S): 9 INJECTION INTRAMUSCULAR; INTRAVENOUS; SUBCUTANEOUS at 21:56

## 2017-08-03 RX ADMIN — SODIUM CHLORIDE 150 MILLILITER(S): 9 INJECTION INTRAMUSCULAR; INTRAVENOUS; SUBCUTANEOUS at 21:58

## 2017-08-03 NOTE — ED PROVIDER NOTE - ATTENDING CONTRIBUTION TO CARE
[I agree with scribe documentation except where as noted below]   85 yof pmhx prior tia (baseline mild to mod confusion), htn, chronic anemia, prior gastric ulcer w admission for upper gi bleed earlier this year s/p upper endoscopy, presents with dizziness and near syncope in the pacu after having an IR procedure for stenting for sv periph vasc disease earlier today. denies any cp or sob prior to the event. states no dark stools recently.     ROS:   constitutional - no fever, no chills  eyes - no visual changes, no redness  eent - no sore throat, no nasal congestion  cvs - no chest pain, no leg swelling, + dizziness, + near syncope   resp - no shortness of breath, no cough  gi - no abdominal pain, no vomiting, no diarrhea  gu - no dysuria, no hematuria  msk - no acute back pain, no joint swelling  skin - no rashes, no jaundice  neuro - no headache, no focal weakness  psych - no acute mental health issue     Physical Exam:   constitutional - well appearing, awake and alert, oriented x3  head - no external evidence of trauma  cvs - rrr, no murmurs, no peripheral edema  resp - breath sounds clear and equal bilat  gi - abdomen soft and nontender, no rigidity, guarding or rebound, bowel sounds present, guiac neg  no inguinal hematoma or pulsatile mass  msk - moving all extremities spontaneously  neuro - alert and oriented x3, no focal deficits, CNs 2-12 grossly intact  skin- no jaundice, warm and dry  psych - mood and affect wnl, no apparent risk to self or others     ? near syncope/dizziness secondary to post-anesthesia given during procedure. pt w low h/h unclear whether due to acute blood loss (however no hypotension, no tachycardia) vs woresning of pt's chronic anemia. will transfuse and admit for further eval. BLAKE Gamez MD

## 2017-08-03 NOTE — ED ADULT NURSE NOTE - OBJECTIVE STATEMENT
Received patient awake and alert to time and place, but unaware why she was brought to the ED. Per EMS, brought in after having an angioplasty done and having a dizziness and near syncope episode. Patients  states the mental status is baseline. Breathing unlabored with no S/S of distress noted. All concerns and questions addressed,  at bedside, will continue to monitor.

## 2017-08-03 NOTE — ED PROVIDER NOTE - OBJECTIVE STATEMENT
86 y/o F with PMHX TIA, HTN, anemia, gastric ulcer, PVD c/o dizziness, confusion near syncope s/p angioplasty of the leg Interventional radiology. PT's  says that pt's baseline is confused.

## 2017-08-03 NOTE — ED ADULT NURSE REASSESSMENT NOTE - NS ED NURSE REASSESS COMMENT FT1
Patient to receive 1 unit of PRBCs, per blood bank, need an hour to process the blood. Patient resting in bed at this time with  at bedside, plan of care reviewed, will continue to monitor.

## 2017-08-04 DIAGNOSIS — D64.89 OTHER SPECIFIED ANEMIAS: ICD-10-CM

## 2017-08-04 DIAGNOSIS — R74.8 ABNORMAL LEVELS OF OTHER SERUM ENZYMES: ICD-10-CM

## 2017-08-04 DIAGNOSIS — D64.9 ANEMIA, UNSPECIFIED: ICD-10-CM

## 2017-08-04 DIAGNOSIS — R42 DIZZINESS AND GIDDINESS: ICD-10-CM

## 2017-08-04 DIAGNOSIS — I73.9 PERIPHERAL VASCULAR DISEASE, UNSPECIFIED: ICD-10-CM

## 2017-08-04 DIAGNOSIS — K25.7 CHRONIC GASTRIC ULCER WITHOUT HEMORRHAGE OR PERFORATION: ICD-10-CM

## 2017-08-04 DIAGNOSIS — I10 ESSENTIAL (PRIMARY) HYPERTENSION: ICD-10-CM

## 2017-08-04 LAB
ALBUMIN SERPL ELPH-MCNC: 4.1 G/DL — SIGNIFICANT CHANGE UP (ref 3.3–5)
ALP SERPL-CCNC: 250 U/L — HIGH (ref 40–120)
ALT FLD-CCNC: 30 U/L RC — SIGNIFICANT CHANGE UP (ref 10–45)
ANION GAP SERPL CALC-SCNC: 12 MMOL/L — SIGNIFICANT CHANGE UP (ref 5–17)
ANTIBODY ID 1_1: SIGNIFICANT CHANGE UP
APPEARANCE UR: CLEAR — SIGNIFICANT CHANGE UP
AST SERPL-CCNC: 30 U/L — SIGNIFICANT CHANGE UP (ref 10–40)
BACTERIA # UR AUTO: NEGATIVE — SIGNIFICANT CHANGE UP
BASOPHILS # BLD AUTO: 0 K/UL — SIGNIFICANT CHANGE UP (ref 0–0.2)
BASOPHILS NFR BLD AUTO: 0.3 % — SIGNIFICANT CHANGE UP (ref 0–2)
BILIRUB SERPL-MCNC: 0.5 MG/DL — SIGNIFICANT CHANGE UP (ref 0.2–1.2)
BILIRUB UR-MCNC: NEGATIVE — SIGNIFICANT CHANGE UP
BLD GP AB SCN SERPL QL: POSITIVE — SIGNIFICANT CHANGE UP
BUN SERPL-MCNC: 23 MG/DL — SIGNIFICANT CHANGE UP (ref 7–23)
CALCIUM SERPL-MCNC: 8.9 MG/DL — SIGNIFICANT CHANGE UP (ref 8.4–10.5)
CHLORIDE SERPL-SCNC: 106 MMOL/L — SIGNIFICANT CHANGE UP (ref 96–108)
CK MB CFR SERPL CALC: 1.3 NG/ML — SIGNIFICANT CHANGE UP (ref 0–3.8)
CK SERPL-CCNC: 36 U/L — SIGNIFICANT CHANGE UP (ref 25–170)
CO2 SERPL-SCNC: 25 MMOL/L — SIGNIFICANT CHANGE UP (ref 22–31)
COLOR SPEC: YELLOW — SIGNIFICANT CHANGE UP
CREAT SERPL-MCNC: 1.01 MG/DL — SIGNIFICANT CHANGE UP (ref 0.5–1.3)
DAT POLY-SP REAG RBC QL: NEGATIVE — SIGNIFICANT CHANGE UP
DIFF PNL FLD: NEGATIVE — SIGNIFICANT CHANGE UP
EOSINOPHIL # BLD AUTO: 0.1 K/UL — SIGNIFICANT CHANGE UP (ref 0–0.5)
EOSINOPHIL NFR BLD AUTO: 1.4 % — SIGNIFICANT CHANGE UP (ref 0–6)
EPI CELLS # UR: 1 /HPF — SIGNIFICANT CHANGE UP (ref 0–5)
GGT SERPL-CCNC: 200 U/L — HIGH (ref 8–40)
GLUCOSE SERPL-MCNC: 123 MG/DL — HIGH (ref 70–99)
GLUCOSE UR QL: NEGATIVE MG/DL — SIGNIFICANT CHANGE UP
HCT VFR BLD CALC: 23.8 % — LOW (ref 34.5–45)
HCT VFR BLD CALC: 26.4 % — LOW (ref 34.5–45)
HGB BLD-MCNC: 7.6 G/DL — LOW (ref 11.5–15.5)
HGB BLD-MCNC: 8.3 G/DL — LOW (ref 11.5–15.5)
HYALINE CASTS # UR AUTO: 0 /LPF — SIGNIFICANT CHANGE UP (ref 0–7)
KETONES UR-MCNC: NEGATIVE — SIGNIFICANT CHANGE UP
LACTATE SERPL-SCNC: 1.2 MMOL/L — SIGNIFICANT CHANGE UP (ref 0.7–2)
LEUKOCYTE ESTERASE UR-ACNC: NEGATIVE — SIGNIFICANT CHANGE UP
LYMPHOCYTES # BLD AUTO: 0.7 K/UL — LOW (ref 1–3.3)
LYMPHOCYTES # BLD AUTO: 10.5 % — LOW (ref 13–44)
MAGNESIUM SERPL-MCNC: 2 MG/DL — SIGNIFICANT CHANGE UP (ref 1.6–2.6)
MCHC RBC-ENTMCNC: 27.2 PG — SIGNIFICANT CHANGE UP (ref 27–34)
MCHC RBC-ENTMCNC: 27.9 PG — SIGNIFICANT CHANGE UP (ref 27–34)
MCHC RBC-ENTMCNC: 31.2 GM/DL — LOW (ref 32–36)
MCHC RBC-ENTMCNC: 32 GM/DL — SIGNIFICANT CHANGE UP (ref 32–36)
MCV RBC AUTO: 87 FL — SIGNIFICANT CHANGE UP (ref 80–100)
MCV RBC AUTO: 87.3 FL — SIGNIFICANT CHANGE UP (ref 80–100)
MONOCYTES # BLD AUTO: 0.6 K/UL — SIGNIFICANT CHANGE UP (ref 0–0.9)
MONOCYTES NFR BLD AUTO: 10.2 % — SIGNIFICANT CHANGE UP (ref 2–14)
NEUTROPHILS # BLD AUTO: 4.8 K/UL — SIGNIFICANT CHANGE UP (ref 1.8–7.4)
NEUTROPHILS NFR BLD AUTO: 77.7 % — HIGH (ref 43–77)
NITRITE UR-MCNC: NEGATIVE — SIGNIFICANT CHANGE UP
PH UR: 6 — SIGNIFICANT CHANGE UP (ref 5–8)
PHOSPHATE SERPL-MCNC: 3.7 MG/DL — SIGNIFICANT CHANGE UP (ref 2.5–4.5)
PLATELET # BLD AUTO: 177 K/UL — SIGNIFICANT CHANGE UP (ref 150–400)
PLATELET # BLD AUTO: 200 K/UL — SIGNIFICANT CHANGE UP (ref 150–400)
POTASSIUM SERPL-MCNC: 4.6 MMOL/L — SIGNIFICANT CHANGE UP (ref 3.5–5.3)
POTASSIUM SERPL-SCNC: 4.6 MMOL/L — SIGNIFICANT CHANGE UP (ref 3.5–5.3)
PROT SERPL-MCNC: 6.5 G/DL — SIGNIFICANT CHANGE UP (ref 6–8.3)
PROT UR-MCNC: ABNORMAL MG/DL
RBC # BLD: 2.73 M/UL — LOW (ref 3.8–5.2)
RBC # BLD: 3.04 M/UL — LOW (ref 3.8–5.2)
RBC # FLD: 17.5 % — HIGH (ref 10.3–14.5)
RBC # FLD: 17.7 % — HIGH (ref 10.3–14.5)
RBC CASTS # UR COMP ASSIST: 1 /HPF — SIGNIFICANT CHANGE UP (ref 0–4)
RH IG SCN BLD-IMP: POSITIVE — SIGNIFICANT CHANGE UP
SODIUM SERPL-SCNC: 143 MMOL/L — SIGNIFICANT CHANGE UP (ref 135–145)
SP GR SPEC: 1.02 — SIGNIFICANT CHANGE UP (ref 1.01–1.02)
TROPONIN T SERPL-MCNC: <0.01 NG/ML — SIGNIFICANT CHANGE UP (ref 0–0.06)
UROBILINOGEN FLD QL: NEGATIVE MG/DL — SIGNIFICANT CHANGE UP
WBC # BLD: 6.2 K/UL — SIGNIFICANT CHANGE UP (ref 3.8–10.5)
WBC # BLD: 6.8 K/UL — SIGNIFICANT CHANGE UP (ref 3.8–10.5)
WBC # FLD AUTO: 6.2 K/UL — SIGNIFICANT CHANGE UP (ref 3.8–10.5)
WBC # FLD AUTO: 6.8 K/UL — SIGNIFICANT CHANGE UP (ref 3.8–10.5)
WBC UR QL: 2 /HPF — SIGNIFICANT CHANGE UP (ref 0–5)

## 2017-08-04 PROCEDURE — 99223 1ST HOSP IP/OBS HIGH 75: CPT | Mod: AI

## 2017-08-04 PROCEDURE — 74176 CT ABD & PELVIS W/O CONTRAST: CPT | Mod: 26

## 2017-08-04 PROCEDURE — 86077 PHYS BLOOD BANK SERV XMATCH: CPT

## 2017-08-04 RX ORDER — FUROSEMIDE 40 MG
40 TABLET ORAL DAILY
Qty: 0 | Refills: 0 | Status: DISCONTINUED | OUTPATIENT
Start: 2017-08-04 | End: 2017-08-04

## 2017-08-04 RX ORDER — PANTOPRAZOLE SODIUM 20 MG/1
40 TABLET, DELAYED RELEASE ORAL
Qty: 0 | Refills: 0 | Status: DISCONTINUED | OUTPATIENT
Start: 2017-08-04 | End: 2017-08-11

## 2017-08-04 RX ORDER — ISOSORBIDE MONONITRATE 60 MG/1
60 TABLET, EXTENDED RELEASE ORAL DAILY
Qty: 0 | Refills: 0 | Status: DISCONTINUED | OUTPATIENT
Start: 2017-08-04 | End: 2017-08-11

## 2017-08-04 RX ORDER — GABAPENTIN 400 MG/1
300 CAPSULE ORAL DAILY
Qty: 0 | Refills: 0 | Status: DISCONTINUED | OUTPATIENT
Start: 2017-08-04 | End: 2017-08-11

## 2017-08-04 RX ORDER — LOSARTAN POTASSIUM 100 MG/1
100 TABLET, FILM COATED ORAL DAILY
Qty: 0 | Refills: 0 | Status: DISCONTINUED | OUTPATIENT
Start: 2017-08-04 | End: 2017-08-11

## 2017-08-04 RX ORDER — DOXAZOSIN MESYLATE 4 MG
1 TABLET ORAL AT BEDTIME
Qty: 0 | Refills: 0 | Status: DISCONTINUED | OUTPATIENT
Start: 2017-08-04 | End: 2017-08-11

## 2017-08-04 RX ORDER — ATORVASTATIN CALCIUM 80 MG/1
80 TABLET, FILM COATED ORAL AT BEDTIME
Qty: 0 | Refills: 0 | Status: DISCONTINUED | OUTPATIENT
Start: 2017-08-04 | End: 2017-08-11

## 2017-08-04 RX ADMIN — Medication 1 MILLIGRAM(S): at 21:59

## 2017-08-04 RX ADMIN — PANTOPRAZOLE SODIUM 40 MILLIGRAM(S): 20 TABLET, DELAYED RELEASE ORAL at 06:52

## 2017-08-04 RX ADMIN — ISOSORBIDE MONONITRATE 60 MILLIGRAM(S): 60 TABLET, EXTENDED RELEASE ORAL at 14:38

## 2017-08-04 RX ADMIN — GABAPENTIN 300 MILLIGRAM(S): 400 CAPSULE ORAL at 13:17

## 2017-08-04 RX ADMIN — PANTOPRAZOLE SODIUM 40 MILLIGRAM(S): 20 TABLET, DELAYED RELEASE ORAL at 17:40

## 2017-08-04 RX ADMIN — LOSARTAN POTASSIUM 100 MILLIGRAM(S): 100 TABLET, FILM COATED ORAL at 17:40

## 2017-08-04 RX ADMIN — ATORVASTATIN CALCIUM 80 MILLIGRAM(S): 80 TABLET, FILM COATED ORAL at 21:59

## 2017-08-04 NOTE — ED ADULT NURSE REASSESSMENT NOTE - NS ED NURSE REASSESS COMMENT FT1
Blood bank called, per blood bank, patient is positive for antibodies and blood is being prepared at this time, not ready at this time, will continue to monitor.

## 2017-08-04 NOTE — CONSULT NOTE ADULT - SUBJECTIVE AND OBJECTIVE BOX
CC: Patient is a 85y old  Female who presents with a chief complaint of dizziness (04 Aug 2017 04:51)      HPI:  84 y/o female with h/o HTN, PVD, s/p TIA that was referred to the ED following lower extremity angioplasty at her vascular surgeon's office (Dr. Sousa). She felt dizzy/lightheaded & was taken to the ED for further observation. W/u revealed anemia with a small left retroperitoneal   hematoma. She received 1 PRBC on admission with appropriate response. She has subsequently remained hemodynamically stable & currently deneis dizziness, lightheadedness, & abd pain.    PMH  Gastric ulcer, unspecified as acute or chronic, without hemorrhage or perforation  Acute stomach ulcer  PVD (peripheral vascular disease)  GIB (gastrointestinal bleeding)  HTN (hypertension)  TIA (transient ischemic attack)    PSH  History of operative procedure on hip  Varicose veins  S/P tonsillectomy    MEDS  MEDICATIONS  (STANDING):  doxazosin 1 milliGRAM(s) Oral at bedtime  isosorbide   mononitrate ER Tablet (IMDUR) 60 milliGRAM(s) Oral daily  gabapentin 300 milliGRAM(s) Oral daily  atorvastatin 80 milliGRAM(s) Oral at bedtime  pantoprazole    Tablet 40 milliGRAM(s) Oral two times a day before meals  losartan 100 milliGRAM(s) Oral daily    Allergies  No Known Allergies    Physical Exam  T(C): 37.2 (17 @ 16:55), Max: 37.5 (17 @ 06:30)  HR: 60 (17 @ 16:55) (60 - 85)  BP: 189/65 (17 @ 16:55) (128/71 - 189/65)  RR: 18 (17 @ 16:55) (16 - 23)  SpO2: 98% (17 @ 16:55) (93% - 100%)  Wt(kg): --  Tmax: T(C): , Max: 37.5 (17 @ 06:30)  Wt(kg): --    Gen: NAD  HEENT: normocephalic, atraumatic, no scleral icterus  CV: S1, S2, RRR  Pulm: CTA B/L  Abd: Soft, ND, mild RLQ TTP, no rebound, no guarding, no palpable organomegaly/masses  Ext: warm, no edema, palp dp B/L    -  -  08-  --------------------------------------------------------  IN:    Oral Fluid: 240 mL  Total IN: 240 mL    OUT:  Total OUT: 0 mL    Total NET: 240 mL          Labs:                        8.3    6.8   )-----------( 177      ( 04 Aug 2017 10:32 )             26.4     -    143  |  106  |  23  ----------------------------<  123<H>  4.6   |  25  |  1.01    Ca    8.9      04 Aug 2017 07:34  Phos  3.7       Mg     2.0         TPro  6.5  /  Alb  4.1  /  TBili  0.5  /  DBili  x   /  AST  30  /  ALT  30  /  AlkPhos  250<H>  08    PT/INR - ( 03 Aug 2017 22:06 )   PT: 11.7 sec;   INR: 1.08 ratio         PTT - ( 03 Aug 2017 22:06 )  PTT:27.9 sec  Urinalysis Basic - ( 04 Aug 2017 08:37 )    Color: Yellow / Appearance: Clear / S.023 / pH: x  Gluc: x / Ketone: Negative  / Bili: Negative / Urobili: Negative mg/dL   Blood: x / Protein: Trace mg/dL / Nitrite: Negative   Leuk Esterase: Negative / RBC: 1 /HPF / WBC 2 /HPF   Sq Epi: x / Non Sq Epi: 1 /HPF / Bacteria: Negative      Imaging  < from: CT Abdomen and Pelvis No Cont (17 @ 06:02) >  Small left retroperitoneal hematoma.    < end of copied text >

## 2017-08-04 NOTE — H&P ADULT - GASTROINTESTINAL COMMENTS
two small incisions lower abdomen covered with tegaderm no active bleeding, mildly distended and tender to palpation

## 2017-08-04 NOTE — ED ADULT NURSE REASSESSMENT NOTE - NS ED NURSE REASSESS COMMENT FT1
0310 - Called blood bank for PRBCs release. Per blood bank needs a few more minutes, blood not ready.

## 2017-08-04 NOTE — CONSULT NOTE ADULT - ASSESSMENT
86 y/o female with small left retroperitoneal hematoma following IR access for lower extremity angioplasty    - Pt s/p 1 PRBC with appropriate response; currently hemodynamically stable. Continue to monitor vitals & H/H. C/w supportive care per primary team, no acute surgical intervention at this time. D/w Dr. Huy Burkett, PGY-4  p [phone] 84 y/o female with small left retroperitoneal hematoma following IR access for lower extremity angioplasty    - Pt s/p 1 PRBC with appropriate response; currently hemodynamically stable. Continue to monitor vitals & H/H. C/w supportive care per primary team, no acute surgical intervention at this time. D/w Dr. Son & pt and primary team (Dr. Cobos)    -Willi Burkett, PGY-4  p [phone]

## 2017-08-04 NOTE — H&P ADULT - PROBLEM SELECTOR PLAN 1
pt had extensive gi work up on last admission and found to have nonbleeding ulcer; this admission pt has no s/s GIB  concern given procedure on abdomen (? entry site given pvd intervention) for intra-abdominal bleeding  -receiving prbc's now, ck post txn cbc to assess efficacy  -ck stat CT a/p (d/w radiology) to r/o bleed  -hold asa/plavix  -no venodynes for now given vasc procedure - can decide on DVT prophylaxis after am assessment of anemia and details of procedure (can d/w ir or vasc)

## 2017-08-04 NOTE — H&P ADULT - NSHPLABSRESULTS_GEN_ALL_CORE
wbc 7.7, hb 6.8, plt 215, na 139, bun 28, creat 1.3  ce neg  bnp 1156  ast 45, alk phos 266 (new), lact 2.1  TTE nl LV and RV, LVH  cxr rotated, ? increased markings (awaiting official read)  cth chronic lacunar infarct  ekg ordered (to follow up)

## 2017-08-04 NOTE — H&P ADULT - RS GEN PE MLT RESP DETAILS PC
clear to auscultation bilaterally/breath sounds equal/airway patent/good air movement/respirations non-labored

## 2017-08-04 NOTE — H&P ADULT - NSHPSOCIALHISTORY_GEN_ALL_CORE
, lives with   quit smoking 35 yrs ago, prior 1/2 PPD x 20-30 yrs  drinks small glass of whiskey or gin a night filled with ice, no withdrawal if she does not drink for a few nights

## 2017-08-04 NOTE — H&P ADULT - HISTORY OF PRESENT ILLNESS
85 f h/o  TIA (baseline mild confusion), htn, anemia, h/o gastric ulcer (5/17 admission for UGIB s/p txn and endoscopies revealing tics, hiatal hernia, nonbleeding gastric ulcer) now p/w episode of dizziness following an IR procedure to intervene on severe PVD.    During May admission once hb stable and GI had done evaluation, pt had LE dopplers for chronic leg pain and found to have severe PVD confirmed on CT scan.  Pt was discharged to rehab with outpt follow up.  Pt states she has been feeling well.  Very mild fatigue but prior to yesterday no episodes of dizziness.  No ha/cp/sob.  Pt has had normal BMs with no melena or blood (states she had this prior to May admission but not since then).  + urinary hesitancy but no dysuria/hematuria.  No other trauma or falls.  Nl appetite, no f/c.    For procedure yesterday pt states she had mild lower abdominal cramping and swelling.  States she was ready to leave and while walking to bathroom she felt very dizzy and had to sit, no LOC.  Was advised to come to ED for evaluation.    on arrival 155/62, hr 78, temp 36.8, rr 18, sat 100  guiac negative in ED  Hb 6.8 from 7.6 in June, receiving 1 U prbc's

## 2017-08-05 ENCOUNTER — TRANSCRIPTION ENCOUNTER (OUTPATIENT)
Age: 82
End: 2017-08-05

## 2017-08-05 LAB
ANION GAP SERPL CALC-SCNC: 15 MMOL/L — SIGNIFICANT CHANGE UP (ref 5–17)
BUN SERPL-MCNC: 14 MG/DL — SIGNIFICANT CHANGE UP (ref 7–23)
CALCIUM SERPL-MCNC: 9.3 MG/DL — SIGNIFICANT CHANGE UP (ref 8.4–10.5)
CHLORIDE SERPL-SCNC: 106 MMOL/L — SIGNIFICANT CHANGE UP (ref 96–108)
CO2 SERPL-SCNC: 20 MMOL/L — LOW (ref 22–31)
CREAT SERPL-MCNC: 0.9 MG/DL — SIGNIFICANT CHANGE UP (ref 0.5–1.3)
CULTURE RESULTS: SIGNIFICANT CHANGE UP
GLUCOSE SERPL-MCNC: 112 MG/DL — HIGH (ref 70–99)
HCT VFR BLD CALC: 23.3 % — LOW (ref 34.5–45)
HGB BLD-MCNC: 7 G/DL — CRITICAL LOW (ref 11.5–15.5)
MCHC RBC-ENTMCNC: 25.1 PG — LOW (ref 27–34)
MCHC RBC-ENTMCNC: 30 GM/DL — LOW (ref 32–36)
MCV RBC AUTO: 83.5 FL — SIGNIFICANT CHANGE UP (ref 80–100)
PLATELET # BLD AUTO: 217 K/UL — SIGNIFICANT CHANGE UP (ref 150–400)
POTASSIUM SERPL-MCNC: 4.1 MMOL/L — SIGNIFICANT CHANGE UP (ref 3.5–5.3)
POTASSIUM SERPL-SCNC: 4.1 MMOL/L — SIGNIFICANT CHANGE UP (ref 3.5–5.3)
RBC # BLD: 2.79 M/UL — LOW (ref 3.8–5.2)
RBC # FLD: 18.4 % — HIGH (ref 10.3–14.5)
SODIUM SERPL-SCNC: 141 MMOL/L — SIGNIFICANT CHANGE UP (ref 135–145)
SPECIMEN SOURCE: SIGNIFICANT CHANGE UP
WBC # BLD: 6.04 K/UL — SIGNIFICANT CHANGE UP (ref 3.8–10.5)
WBC # FLD AUTO: 6.04 K/UL — SIGNIFICANT CHANGE UP (ref 3.8–10.5)

## 2017-08-05 RX ORDER — LOSARTAN POTASSIUM 100 MG/1
1 TABLET, FILM COATED ORAL
Qty: 0 | Refills: 0 | COMMUNITY
Start: 2017-08-05

## 2017-08-05 RX ORDER — ACETAMINOPHEN 500 MG
650 TABLET ORAL ONCE
Qty: 0 | Refills: 0 | Status: COMPLETED | OUTPATIENT
Start: 2017-08-05 | End: 2017-08-05

## 2017-08-05 RX ADMIN — LOSARTAN POTASSIUM 100 MILLIGRAM(S): 100 TABLET, FILM COATED ORAL at 05:20

## 2017-08-05 RX ADMIN — PANTOPRAZOLE SODIUM 40 MILLIGRAM(S): 20 TABLET, DELAYED RELEASE ORAL at 16:21

## 2017-08-05 RX ADMIN — ISOSORBIDE MONONITRATE 60 MILLIGRAM(S): 60 TABLET, EXTENDED RELEASE ORAL at 12:18

## 2017-08-05 RX ADMIN — Medication 1 MILLIGRAM(S): at 21:45

## 2017-08-05 RX ADMIN — Medication 650 MILLIGRAM(S): at 22:58

## 2017-08-05 RX ADMIN — Medication 650 MILLIGRAM(S): at 21:58

## 2017-08-05 RX ADMIN — GABAPENTIN 300 MILLIGRAM(S): 400 CAPSULE ORAL at 11:28

## 2017-08-05 RX ADMIN — ATORVASTATIN CALCIUM 80 MILLIGRAM(S): 80 TABLET, FILM COATED ORAL at 21:45

## 2017-08-05 RX ADMIN — PANTOPRAZOLE SODIUM 40 MILLIGRAM(S): 20 TABLET, DELAYED RELEASE ORAL at 05:20

## 2017-08-05 NOTE — DISCHARGE NOTE ADULT - PLAN OF CARE
Follow up with your Primary care doctor in 1 week Bleeding resolves Low salt diet  Activity as tolerated.  Take all medication as prescribed.  Follow up with your medical doctor for routine blood pressure monitoring at your next visit.  Notify your doctor if you have any of the following symptoms:   Dizziness, Lightheadedness, Blurry vision, Headache, Chest pain, Shortness of breath resolved There are 2 common types of GI Bleed, Upper GI Bleed and Lower GI Bleed.  Upper GI Bleed affects the esophagus, stomach, and first part of the small intestine. Lower GI Bleed affects the colon and rectum.  Upper GI Bleed signs and symptoms to notify your Health Care Provider are vomiting blood, or coffee ground vomitus, and bowel movements that look like black tar.  Lower GI Bleed signs and symptoms to notify your health care provider are bright red bloody bowel movements.   Take your medications as prescribed by your Gastroenterologist.  If you have had an Endoscopy or Colonoscopy, follow up with your Gastroenterologist for Pathology results.  Avoid NSAIDs unless your Health Care Provider tells you that it is ok (Aspirin, Ibuprofen, Advil, Motrin, Aleve).  Follow up with your Gastroenterologist within 1-2 weeks of discharge. Continue with medications as prescribed by your doctor.  Follow-up with your primary care physician in 1 week.   TIA is a small transient stroke . A stroke is a brain attack that occurs when an artery or a blood vessel becomes occluded breaks, interrupting blood flow to an area of the brain cells begin to die. Please call 911 for facial droop slurring speech, unable to move a limb or sudden weakness in one limb or one side of the body or confusion. Continue with Prednisone as prescribed.  Follow-up with your primary care physician 1 week after discharge from rehab. Follow up with your Primary care doctor in 1 week.  Symptoms to report, bleeding, palpitations, fatigue, pale skin, cold skin, dizziness. Take medications as ordered by PCP

## 2017-08-05 NOTE — PHYSICAL THERAPY INITIAL EVALUATION ADULT - PERTINENT HX OF CURRENT PROBLEM, REHAB EVAL
85 f h/o  TIA (baseline mild confusion), htn, anemia, h/o gastric ulcer (5/17 admission for UGIB s/p txn and endoscopies revealing tics, hiatal hernia, nonbleeding gastric ulcer) now p/w episode of dizziness following an IR procedure to intervene on severe PVD. Result shows decreased h/h, CT head: No acute intracranial hemorrhage, mass effect, or acute osseous fracture. CT abd showed Small left retroperitoneal hematoma,

## 2017-08-05 NOTE — DISCHARGE NOTE ADULT - CARE PROVIDER_API CALL
Dr. Amrit Rasmussen, Primary care physician  [facility]   [address], [address] [place] [address]  Phone: [phone]  Fax: (   )    -

## 2017-08-05 NOTE — DISCHARGE NOTE ADULT - MEDICATION SUMMARY - MEDICATIONS TO TAKE
I will START or STAY ON the medications listed below when I get home from the hospital:    predniSONE 20 mg oral tablet  -- 1 tab(s) by mouth once tomorrow Aug 12.  Then 10mg by mouth daily for following 3 days (8/13, 8/14, 8/15)  Then t5mg by mouth daily for  3 days (8/16, 8/17, 8/18). Then stop.  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    acetaminophen 325 mg oral tablet  -- 2 tab(s) by mouth every 6 hours, As needed, Mild Pain (1 - 3)  -- Indication: For mild pain    aspirin 81 mg oral delayed release tablet  -- 1 tab(s) by mouth once a day  -- Indication: For Protect from clot formation    losartan 100 mg oral tablet  -- 1 tab(s) by mouth once a day  -- Indication: For High blood pressure    doxazosin 1 mg oral tablet  -- 1 tab(s) by mouth once a day (at bedtime)  -- Indication: For High blood pressure, peripheral vascular disease    isosorbide mononitrate 60 mg oral tablet, extended release  -- 1 tab(s) by mouth once a day  -- Indication: For High blood pressure, protect from chest  discomfort    gabapentin 300 mg oral capsule  -- 1 cap(s) by mouth once a day  -- Indication: For Chronic pain    atorvastatin 80 mg oral tablet  -- 1 tab(s) by mouth once a day  -- Indication: For High cholesterol    clopidogrel 75 mg oral tablet  -- 1 tab(s) by mouth once a day  -- Indication: For TIA (transient ischemic attack)    amLODIPine 10 mg oral tablet  -- 1 tab(s) by mouth once a day  -- Indication: For High blood pressure    furosemide 40 mg oral tablet  -- 1 tab(s) by mouth once a day  -- Indication: For fluid overload    pantoprazole 40 mg oral delayed release tablet  -- 1 tab(s) by mouth 2 times a day  -- Indication: For Gastric ulcer I will START or STAY ON the medications listed below when I get home from the hospital:    predniSONE 20 mg oral tablet  -- 1 tab(s) by mouth once tomorrow Aug 12.   Then stop.  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    aspirin 81 mg oral delayed release tablet  -- 1 tab(s) by mouth once a day  -- Indication: For Protect from clot formation    acetaminophen 325 mg oral tablet  -- 2 tab(s) by mouth every 6 hours, As needed, Mild Pain (1 - 3)  -- Indication: For mild pain    losartan 100 mg oral tablet  -- 1 tab(s) by mouth once a day  -- Indication: For High blood pressure    doxazosin 1 mg oral tablet  -- 1 tab(s) by mouth once a day (at bedtime)  -- Indication: For High blood pressure, peripheral vascular disease    isosorbide mononitrate 60 mg oral tablet, extended release  -- 1 tab(s) by mouth once a day  -- Indication: For High blood pressure, protect from chest  discomfort    gabapentin 300 mg oral capsule  -- 1 cap(s) by mouth once a day  -- Indication: For Chronic pain    atorvastatin 80 mg oral tablet  -- 1 tab(s) by mouth once a day  -- Indication: For High cholesterol    clopidogrel 75 mg oral tablet  -- 1 tab(s) by mouth once a day  -- Indication: For TIA (transient ischemic attack)    amLODIPine 10 mg oral tablet  -- 1 tab(s) by mouth once a day  -- Indication: For High blood pressure    furosemide 40 mg oral tablet  -- 1 tab(s) by mouth once a day  -- Indication: For fluid overload    pantoprazole 40 mg oral delayed release tablet  -- 1 tab(s) by mouth 2 times a day  -- Indication: For Gastric ulcer

## 2017-08-05 NOTE — DISCHARGE NOTE ADULT - HOME CARE AGENCY
[place] [phone] for RN visit to assess for home P/T for start of care the day after discharge

## 2017-08-05 NOTE — DISCHARGE NOTE ADULT - CARE PLAN
Principal Discharge DX:	Anemia Principal Discharge DX:	Acute blood loss anemia  Goal:	Bleeding resolves  Instructions for follow-up, activity and diet:	Follow up with your Primary care doctor in 1 week  Secondary Diagnosis:	GIB (gastrointestinal bleeding)  Secondary Diagnosis:	HTN (hypertension)  Instructions for follow-up, activity and diet:	Low salt diet  Activity as tolerated.  Take all medication as prescribed.  Follow up with your medical doctor for routine blood pressure monitoring at your next visit.  Notify your doctor if you have any of the following symptoms:   Dizziness, Lightheadedness, Blurry vision, Headache, Chest pain, Shortness of breath  Secondary Diagnosis:	Dizziness  Instructions for follow-up, activity and diet:	resolved Principal Discharge DX:	Acute blood loss anemia  Goal:	Bleeding resolves  Instructions for follow-up, activity and diet:	Follow up with your Primary care doctor in 1 week.  Symptoms to report, bleeding, palpitations, fatigue, pale skin, cold skin, dizziness. Take medications as ordered by PCP  Secondary Diagnosis:	GIB (gastrointestinal bleeding)  Instructions for follow-up, activity and diet:	There are 2 common types of GI Bleed, Upper GI Bleed and Lower GI Bleed.  Upper GI Bleed affects the esophagus, stomach, and first part of the small intestine. Lower GI Bleed affects the colon and rectum.  Upper GI Bleed signs and symptoms to notify your Health Care Provider are vomiting blood, or coffee ground vomitus, and bowel movements that look like black tar.  Lower GI Bleed signs and symptoms to notify your health care provider are bright red bloody bowel movements.   Take your medications as prescribed by your Gastroenterologist.  If you have had an Endoscopy or Colonoscopy, follow up with your Gastroenterologist for Pathology results.  Avoid NSAIDs unless your Health Care Provider tells you that it is ok (Aspirin, Ibuprofen, Advil, Motrin, Aleve).  Follow up with your Gastroenterologist within 1-2 weeks of discharge.  Secondary Diagnosis:	HTN (hypertension)  Instructions for follow-up, activity and diet:	Low salt diet  Activity as tolerated.  Take all medication as prescribed.  Follow up with your medical doctor for routine blood pressure monitoring at your next visit.  Notify your doctor if you have any of the following symptoms:   Dizziness, Lightheadedness, Blurry vision, Headache, Chest pain, Shortness of breath  Secondary Diagnosis:	Dizziness  Instructions for follow-up, activity and diet:	resolved  Secondary Diagnosis:	TIA (transient ischemic attack)  Instructions for follow-up, activity and diet:	Continue with medications as prescribed by your doctor.  Follow-up with your primary care physician in 1 week.   TIA is a small transient stroke . A stroke is a brain attack that occurs when an artery or a blood vessel becomes occluded breaks, interrupting blood flow to an area of the brain cells begin to die. Please call 911 for facial droop slurring speech, unable to move a limb or sudden weakness in one limb or one side of the body or confusion.  Secondary Diagnosis:	Acute idiopathic gout of left hand  Instructions for follow-up, activity and diet:	Continue with Prednisone as prescribed.  Follow-up with your primary care physician 1 week after discharge from rehab. Principal Discharge DX:	Acute blood loss anemia  Goal:	Bleeding resolves  Instructions for follow-up, activity and diet:	Follow up with your Primary care doctor in 1 week.  Symptoms to report, bleeding, palpitations, fatigue, pale skin, cold skin, dizziness. Take medications as ordered by PCP  Secondary Diagnosis:	GIB (gastrointestinal bleeding)  Instructions for follow-up, activity and diet:	There are 2 common types of GI Bleed, Upper GI Bleed and Lower GI Bleed.  Upper GI Bleed affects the esophagus, stomach, and first part of the small intestine. Lower GI Bleed affects the colon and rectum.  Upper GI Bleed signs and symptoms to notify your Health Care Provider are vomiting blood, or coffee ground vomitus, and bowel movements that look like black tar.  Lower GI Bleed signs and symptoms to notify your health care provider are bright red bloody bowel movements.   Take your medications as prescribed by your Gastroenterologist.  If you have had an Endoscopy or Colonoscopy, follow up with your Gastroenterologist for Pathology results.  Avoid NSAIDs unless your Health Care Provider tells you that it is ok (Aspirin, Ibuprofen, Advil, Motrin, Aleve).  Follow up with your Gastroenterologist within 1-2 weeks of discharge.  Secondary Diagnosis:	HTN (hypertension)  Instructions for follow-up, activity and diet:	Low salt diet  Activity as tolerated.  Take all medication as prescribed.  Follow up with your medical doctor for routine blood pressure monitoring at your next visit.  Notify your doctor if you have any of the following symptoms:   Dizziness, Lightheadedness, Blurry vision, Headache, Chest pain, Shortness of breath  Secondary Diagnosis:	Dizziness  Instructions for follow-up, activity and diet:	resolved  Secondary Diagnosis:	TIA (transient ischemic attack)  Instructions for follow-up, activity and diet:	Continue with medications as prescribed by your doctor.  Follow-up with your primary care physician in 1 week.   TIA is a small transient stroke . A stroke is a brain attack that occurs when an artery or a blood vessel becomes occluded breaks, interrupting blood flow to an area of the brain cells begin to die. Please call 911 for facial droop slurring speech, unable to move a limb or sudden weakness in one limb or one side of the body or confusion.  Secondary Diagnosis:	Arthritis of hand, left  Instructions for follow-up, activity and diet:	Continue with Prednisone as prescribed.  Follow-up with your primary care physician 1 week after discharge from rehab.

## 2017-08-05 NOTE — DISCHARGE NOTE ADULT - PATIENT PORTAL LINK FT
“You can access the FollowHealth Patient Portal, offered by [facility], by registering with the following website: [email or web address]”

## 2017-08-05 NOTE — PHYSICAL THERAPY INITIAL EVALUATION ADULT - ADDITIONAL COMMENTS
PT stated she lives in a pvt house with spouse 5steps to enter, HR present, Independent in ADLs PTA, Owns RW used for outdoor ambulation.

## 2017-08-05 NOTE — PROGRESS NOTE ADULT - SUBJECTIVE AND OBJECTIVE BOX
Patient is a 85y old  Female who presents with a chief complaint of dizziness (05 Aug 2017 08:08)      SUBJECTIVE / OVERNIGHT EVENTS: no new c/o, no abd pain. s/p iliac art. stenting b/l on day of admission. developed small retroperitoneal bleed, seen by surgery, if HH stable today, DC home. DW her [facility] surgeon Dr. Sousa    MEDICATIONS  (STANDING):  doxazosin 1 milliGRAM(s) Oral at bedtime  isosorbide   mononitrate ER Tablet (IMDUR) 60 milliGRAM(s) Oral daily  gabapentin 300 milliGRAM(s) Oral daily  atorvastatin 80 milliGRAM(s) Oral at bedtime  pantoprazole    Tablet 40 milliGRAM(s) Oral two times a day before meals  losartan 100 milliGRAM(s) Oral daily    MEDICATIONS  (PRN):      Vital Signs Last 24 Hrs  T(F): 98.4 (17 @ 05:17), Max: 99.5 (17 @ 21:53)  HR: 76 (17 @ 05:17) (60 - 83)  BP: 157/60 (17 @ 05:17) (157/60 - 189/65)  RR: 18 (17 @ 05:17) (18 - 19)  SpO2: 96% (17 @ 05:17) (94% - 98%)  Telemetry:   CAPILLARY BLOOD GLUCOSE        I&O's Summary    04 Aug 2017 07:01  -  05 Aug 2017 07:00  --------------------------------------------------------  IN: 480 mL / OUT: 0 mL / NET: 480 mL        PHYSICAL EXAM:  GENERAL: NAD, well-developed  HEAD:  Atraumatic, Normocephalic  EYES: EOMI, PERRLA, conjunctiva and sclera clear  NECK: Supple, No JVD  CHEST/LUNG: Clear to auscultation bilaterally; No wheeze  HEART: Regular rate and rhythm; No murmurs, rubs, or gallops  ABDOMEN: Soft, Nontender, Nondistended; Bowel sounds present  EXTREMITIES:  2+ Peripheral Pulses, No clubbing, cyanosis, or edema  PSYCH: AAOx3  NEUROLOGY: non-focal  SKIN: No rashes or lesions    LABS:                        8.3    6.8   )-----------( 177      ( 04 Aug 2017 10:32 )             26.4     08-    143  |  106  |  23  ----------------------------<  123<H>  4.6   |  25  |  1.01    Ca    8.9      04 Aug 2017 07:34  Phos  3.7     08-  Mg     2.0     -    TPro  6.5  /  Alb  4.1  /  TBili  0.5  /  DBili  x   /  AST  30  /  ALT  30  /  AlkPhos  250<H>  08-04    PT/INR - ( 03 Aug 2017 22:06 )   PT: 11.7 sec;   INR: 1.08 ratio         PTT - ( 03 Aug 2017 22:06 )  PTT:27.9 sec  CARDIAC MARKERS ( 04 Aug 2017 08:58 )  x     / x     / 36 U/L / x     / x      CARDIAC MARKERS ( 04 Aug 2017 07:34 )  x     / <0.01 ng/mL / x     / x     / 1.3 ng/mL  CARDIAC MARKERS ( 03 Aug 2017 22:06 )  x     / <0.01 ng/mL / 49 U/L / x     / x          Urinalysis Basic - ( 04 Aug 2017 08:37 )    Color: Yellow / Appearance: Clear / S.023 / pH: x  Gluc: x / Ketone: Negative  / Bili: Negative / Urobili: Negative mg/dL   Blood: x / Protein: Trace mg/dL / Nitrite: Negative   Leuk Esterase: Negative / RBC: 1 /HPF / WBC 2 /HPF   Sq Epi: x / Non Sq Epi: 1 /HPF / Bacteria: Negative        RADIOLOGY & ADDITIONAL TESTS:    Imaging Personally Reviewed:    Consultant(s) Notes Reviewed:      Care Discussed with Consultants/Other Providers:

## 2017-08-05 NOTE — DISCHARGE NOTE ADULT - PROVIDER TOKENS
FREE:[LAST:[Dr. Amrit Rasmussen],FIRST:[Primary care physician],PHONE:[phone]],FAX:[(   [phone]    -],ADDRESS:[[facility]   [address] [place] [address]]]

## 2017-08-05 NOTE — PHYSICAL THERAPY INITIAL EVALUATION ADULT - PLANNED THERAPY INTERVENTIONS, PT EVAL
bed mobility training/motor coordination training/balance training/gait training/transfer training/strengthening

## 2017-08-05 NOTE — PHYSICAL THERAPY INITIAL EVALUATION ADULT - DISCHARGE DISPOSITION, PT EVAL
home w/assist of family and home PT to improve her strength, balance, gait, and for home assessment for safety/home w/ home PT

## 2017-08-05 NOTE — DISCHARGE NOTE ADULT - SECONDARY DIAGNOSIS.
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

## 2017-08-06 DIAGNOSIS — T14.8 OTHER INJURY OF UNSPECIFIED BODY REGION: ICD-10-CM

## 2017-08-06 LAB
HCT VFR BLD CALC: 26.3 % — LOW (ref 34.5–45)
HGB BLD-MCNC: 8.2 G/DL — LOW (ref 11.5–15.5)
MCHC RBC-ENTMCNC: 26 PG — LOW (ref 27–34)
MCHC RBC-ENTMCNC: 31.2 GM/DL — LOW (ref 32–36)
MCV RBC AUTO: 83.5 FL — SIGNIFICANT CHANGE UP (ref 80–100)
PLATELET # BLD AUTO: 209 K/UL — SIGNIFICANT CHANGE UP (ref 150–400)
RBC # BLD: 3.15 M/UL — LOW (ref 3.8–5.2)
RBC # FLD: 18.4 % — HIGH (ref 10.3–14.5)
WBC # BLD: 6.5 K/UL — SIGNIFICANT CHANGE UP (ref 3.8–10.5)
WBC # FLD AUTO: 6.5 K/UL — SIGNIFICANT CHANGE UP (ref 3.8–10.5)

## 2017-08-06 RX ADMIN — ATORVASTATIN CALCIUM 80 MILLIGRAM(S): 80 TABLET, FILM COATED ORAL at 21:10

## 2017-08-06 RX ADMIN — ISOSORBIDE MONONITRATE 60 MILLIGRAM(S): 60 TABLET, EXTENDED RELEASE ORAL at 11:38

## 2017-08-06 RX ADMIN — PANTOPRAZOLE SODIUM 40 MILLIGRAM(S): 20 TABLET, DELAYED RELEASE ORAL at 05:47

## 2017-08-06 RX ADMIN — GABAPENTIN 300 MILLIGRAM(S): 400 CAPSULE ORAL at 11:38

## 2017-08-06 RX ADMIN — PANTOPRAZOLE SODIUM 40 MILLIGRAM(S): 20 TABLET, DELAYED RELEASE ORAL at 17:09

## 2017-08-06 RX ADMIN — LOSARTAN POTASSIUM 100 MILLIGRAM(S): 100 TABLET, FILM COATED ORAL at 05:47

## 2017-08-06 RX ADMIN — Medication 1 MILLIGRAM(S): at 21:10

## 2017-08-06 NOTE — PROGRESS NOTE ADULT - SUBJECTIVE AND OBJECTIVE BOX
INTERVAL HPI/OVERNIGHT EVENTS: Pt seen and examined. Feeling well. Eating and tolerating diet. Rec'd 1 unit PRBCs yesterday w/ appropriate response.         MEDICATIONS  (STANDING):  doxazosin 1 milliGRAM(s) Oral at bedtime  isosorbide   mononitrate ER Tablet (IMDUR) 60 milliGRAM(s) Oral daily  gabapentin 300 milliGRAM(s) Oral daily  atorvastatin 80 milliGRAM(s) Oral at bedtime  pantoprazole    Tablet 40 milliGRAM(s) Oral two times a day before meals  losartan 100 milliGRAM(s) Oral daily    MEDICATIONS  (PRN):      Vital Signs Last 24 Hrs  T(C): 36.9 (06 Aug 2017 09:14), Max: 37.1 (05 Aug 2017 21:37)  T(F): 98.4 (06 Aug 2017 09:14), Max: 98.8 (05 Aug 2017 21:37)  HR: 82 (06 Aug 2017 09:14) (74 - 88)  BP: 170/82 (06 Aug 2017 09:14) (140/89 - 192/78)  BP(mean): --  RR: 18 (06 Aug 2017 09:14) (18 - 18)  SpO2: 95% (06 Aug 2017 09:14) (95% - 97%)    PHYSICAL EXAM:      Constitutional: NAD    Gastrointestinal: Abd soft, NT, ND            I&O's Detail    05 Aug 2017 07:01  -  06 Aug 2017 07:00  --------------------------------------------------------  IN:    Oral Fluid: 1310 mL    Packed Red Blood Cells: 310 mL  Total IN: 1620 mL    OUT:    Voided: 150 mL  Total OUT: 150 mL    Total NET: 1470 mL          LABS:                        8.2    6.50  )-----------( 209      ( 06 Aug 2017 08:51 )             26.3     08-05    141  |  106  |  14  ----------------------------<  112<H>  4.1   |  20<L>  |  0.90    Ca    9.3      05 Aug 2017 09:19            RADIOLOGY & ADDITIONAL STUDIES:

## 2017-08-06 NOTE — PROGRESS NOTE ADULT - SUBJECTIVE AND OBJECTIVE BOX
Patient is a 85y old  Female who presents with a chief complaint of dizziness (05 Aug 2017 08:08)      SUBJECTIVE / OVERNIGHT EVENTS:    MEDICATIONS  (STANDING):  doxazosin 1 milliGRAM(s) Oral at bedtime  isosorbide   mononitrate ER Tablet (IMDUR) 60 milliGRAM(s) Oral daily  gabapentin 300 milliGRAM(s) Oral daily  atorvastatin 80 milliGRAM(s) Oral at bedtime  pantoprazole    Tablet 40 milliGRAM(s) Oral two times a day before meals  losartan 100 milliGRAM(s) Oral daily    MEDICATIONS  (PRN):      Vital Signs Last 24 Hrs  T(F): 99 (08-06-17 @ 20:58), Max: 99 (08-06-17 @ 20:58)  HR: 67 (08-06-17 @ 20:58) (67 - 82)  BP: 168/74 (08-06-17 @ 20:58) (154/64 - 170/82)  RR: 18 (08-06-17 @ 20:58) (18 - 18)  SpO2: 96% (08-06-17 @ 20:58) (95% - 97%)  Telemetry:   CAPILLARY BLOOD GLUCOSE        I&O's Summary    05 Aug 2017 07:01  -  06 Aug 2017 07:00  --------------------------------------------------------  IN: 1620 mL / OUT: 150 mL / NET: 1470 mL    06 Aug 2017 07:01  -  06 Aug 2017 22:54  --------------------------------------------------------  IN: 360 mL / OUT: 450 mL / NET: -90 mL        PHYSICAL EXAM:  GENERAL: NAD, well-developed  HEAD:  Atraumatic, Normocephalic  EYES: EOMI, PERRLA, conjunctiva and sclera clear  NECK: Supple, No JVD  CHEST/LUNG: Clear to auscultation bilaterally; No wheeze  HEART: Regular rate and rhythm; No murmurs, rubs, or gallops  ABDOMEN: Soft, Nontender, Nondistended; Bowel sounds present  EXTREMITIES:  2+ Peripheral Pulses, No clubbing, cyanosis, or edema  PSYCH: AAOx3  NEUROLOGY: non-focal  SKIN: No rashes or lesions    LABS:                        8.2    6.50  )-----------( 209      ( 06 Aug 2017 08:51 )             26.3     08-05    141  |  106  |  14  ----------------------------<  112<H>  4.1   |  20<L>  |  0.90    Ca    9.3      05 Aug 2017 09:19                RADIOLOGY & ADDITIONAL TESTS:    Imaging Personally Reviewed:    Consultant(s) Notes Reviewed:      Care Discussed with Consultants/Other Providers: Patient is a 85y old  Female who presents with a chief complaint of dizziness (05 Aug 2017 08:08)      SUBJECTIVE / OVERNIGHT EVENTS: no new c/o, no abd pain    MEDICATIONS  (STANDING):  doxazosin 1 milliGRAM(s) Oral at bedtime  isosorbide   mononitrate ER Tablet (IMDUR) 60 milliGRAM(s) Oral daily  gabapentin 300 milliGRAM(s) Oral daily  atorvastatin 80 milliGRAM(s) Oral at bedtime  pantoprazole    Tablet 40 milliGRAM(s) Oral two times a day before meals  losartan 100 milliGRAM(s) Oral daily    MEDICATIONS  (PRN):      Vital Signs Last 24 Hrs  T(F): 99 (08-06-17 @ 20:58), Max: 99 (08-06-17 @ 20:58)  HR: 67 (08-06-17 @ 20:58) (67 - 82)  BP: 168/74 (08-06-17 @ 20:58) (154/64 - 170/82)  RR: 18 (08-06-17 @ 20:58) (18 - 18)  SpO2: 96% (08-06-17 @ 20:58) (95% - 97%)  Telemetry:   CAPILLARY BLOOD GLUCOSE        I&O's Summary    05 Aug 2017 07:01  -  06 Aug 2017 07:00  --------------------------------------------------------  IN: 1620 mL / OUT: 150 mL / NET: 1470 mL    06 Aug 2017 07:01  -  06 Aug 2017 22:54  --------------------------------------------------------  IN: 360 mL / OUT: 450 mL / NET: -90 mL        PHYSICAL EXAM:  GENERAL: NAD, well-developed  HEAD:  Atraumatic, Normocephalic  EYES: EOMI, PERRLA, conjunctiva and sclera clear  NECK: Supple, No JVD  CHEST/LUNG: Clear to auscultation bilaterally; No wheeze  HEART: Regular rate and rhythm; No murmurs, rubs, or gallops  ABDOMEN: Soft, Nontender, Nondistended; Bowel sounds present  EXTREMITIES:  2+ Peripheral Pulses, No clubbing, cyanosis, or edema  PSYCH: AAOx3  NEUROLOGY: non-focal  SKIN: No rashes or lesions    LABS:                        8.2    6.50  )-----------( 209      ( 06 Aug 2017 08:51 )             26.3     08-05    141  |  106  |  14  ----------------------------<  112<H>  4.1   |  20<L>  |  0.90    Ca    9.3      05 Aug 2017 09:19                RADIOLOGY & ADDITIONAL TESTS:    Imaging Personally Reviewed:    Consultant(s) Notes Reviewed:      Care Discussed with Consultants/Other Providers:

## 2017-08-07 LAB
ANION GAP SERPL CALC-SCNC: 14 MMOL/L — SIGNIFICANT CHANGE UP (ref 5–17)
BUN SERPL-MCNC: 14 MG/DL — SIGNIFICANT CHANGE UP (ref 7–23)
CALCIUM SERPL-MCNC: 9.2 MG/DL — SIGNIFICANT CHANGE UP (ref 8.4–10.5)
CHLORIDE SERPL-SCNC: 104 MMOL/L — SIGNIFICANT CHANGE UP (ref 96–108)
CO2 SERPL-SCNC: 21 MMOL/L — LOW (ref 22–31)
CREAT SERPL-MCNC: 0.98 MG/DL — SIGNIFICANT CHANGE UP (ref 0.5–1.3)
GLUCOSE SERPL-MCNC: 103 MG/DL — HIGH (ref 70–99)
HCT VFR BLD CALC: 25.2 % — LOW (ref 34.5–45)
HCT VFR BLD CALC: 27.5 % — LOW (ref 34.5–45)
HGB BLD-MCNC: 7.8 G/DL — LOW (ref 11.5–15.5)
HGB BLD-MCNC: 8.9 G/DL — LOW (ref 11.5–15.5)
MCHC RBC-ENTMCNC: 26 PG — LOW (ref 27–34)
MCHC RBC-ENTMCNC: 28.3 PG — SIGNIFICANT CHANGE UP (ref 27–34)
MCHC RBC-ENTMCNC: 31 GM/DL — LOW (ref 32–36)
MCHC RBC-ENTMCNC: 32.4 GM/DL — SIGNIFICANT CHANGE UP (ref 32–36)
MCV RBC AUTO: 84 FL — SIGNIFICANT CHANGE UP (ref 80–100)
MCV RBC AUTO: 87.2 FL — SIGNIFICANT CHANGE UP (ref 80–100)
PLATELET # BLD AUTO: 212 K/UL — SIGNIFICANT CHANGE UP (ref 150–400)
PLATELET # BLD AUTO: 220 K/UL — SIGNIFICANT CHANGE UP (ref 150–400)
POTASSIUM SERPL-MCNC: 4.2 MMOL/L — SIGNIFICANT CHANGE UP (ref 3.5–5.3)
POTASSIUM SERPL-SCNC: 4.2 MMOL/L — SIGNIFICANT CHANGE UP (ref 3.5–5.3)
RBC # BLD: 3 M/UL — LOW (ref 3.8–5.2)
RBC # BLD: 3.15 M/UL — LOW (ref 3.8–5.2)
RBC # FLD: 17.3 % — HIGH (ref 10.3–14.5)
RBC # FLD: 18.1 % — HIGH (ref 10.3–14.5)
SODIUM SERPL-SCNC: 139 MMOL/L — SIGNIFICANT CHANGE UP (ref 135–145)
WBC # BLD: 5.82 K/UL — SIGNIFICANT CHANGE UP (ref 3.8–10.5)
WBC # BLD: 7 K/UL — SIGNIFICANT CHANGE UP (ref 3.8–10.5)
WBC # FLD AUTO: 5.82 K/UL — SIGNIFICANT CHANGE UP (ref 3.8–10.5)
WBC # FLD AUTO: 7 K/UL — SIGNIFICANT CHANGE UP (ref 3.8–10.5)

## 2017-08-07 PROCEDURE — 99222 1ST HOSP IP/OBS MODERATE 55: CPT | Mod: GC

## 2017-08-07 RX ORDER — IRON SUCROSE 20 MG/ML
200 INJECTION, SOLUTION INTRAVENOUS ONCE
Qty: 0 | Refills: 0 | Status: COMPLETED | OUTPATIENT
Start: 2017-08-07 | End: 2017-08-07

## 2017-08-07 RX ADMIN — PANTOPRAZOLE SODIUM 40 MILLIGRAM(S): 20 TABLET, DELAYED RELEASE ORAL at 17:02

## 2017-08-07 RX ADMIN — PANTOPRAZOLE SODIUM 40 MILLIGRAM(S): 20 TABLET, DELAYED RELEASE ORAL at 05:23

## 2017-08-07 RX ADMIN — LOSARTAN POTASSIUM 100 MILLIGRAM(S): 100 TABLET, FILM COATED ORAL at 05:18

## 2017-08-07 RX ADMIN — GABAPENTIN 300 MILLIGRAM(S): 400 CAPSULE ORAL at 11:14

## 2017-08-07 RX ADMIN — IRON SUCROSE 110 MILLIGRAM(S): 20 INJECTION, SOLUTION INTRAVENOUS at 14:06

## 2017-08-07 RX ADMIN — ISOSORBIDE MONONITRATE 60 MILLIGRAM(S): 60 TABLET, EXTENDED RELEASE ORAL at 11:14

## 2017-08-07 RX ADMIN — ATORVASTATIN CALCIUM 80 MILLIGRAM(S): 80 TABLET, FILM COATED ORAL at 20:31

## 2017-08-07 RX ADMIN — Medication 1 MILLIGRAM(S): at 20:31

## 2017-08-07 NOTE — CONSULT NOTE ADULT - SUBJECTIVE AND OBJECTIVE BOX
Chief Complaint:  Patient is a 85y old  Female who presents with a chief complaint of dizziness (05 Aug 2017 08:08)      HPI: 85 F h/o TIA (baseline mild confusion), HTN, anemia, h/o gastric ulcer (5/17 admission for UGIB) presented on 08/04 with an episode of dizziness following an IR procedure to intervene on severe PVD.    On admission, H/H was 6.8 (baseline 9-10), pt received 1u prbc with appropriate response (7.6) on 08/03. Pt received another 1u prbc for hgb of 7, also with appropriate response. Bleeding was thought to be 2/2 to L retroperitoneal hematoma seen on imaging. Today, pt endorses seeing black stool along with brown stool today which is the first time it has happened since admission. Pt denies BRBPR, hematemesis, fever, chills, dysphagia, odynophagia, headache, bloating sensation, early satiety, weight loss.    Of note, during her hospital stay in 05/2017, EGD and colonoscopy showed diverticulosis in the sigmoid colon and in the descending colon, hiatal hernia, non-bleeding gastric ulcer with no stigmata of bleeding, erythematous mucosa in the antrum and duodenum. Pt was told to follow up with outpatient gastroenterology for a capsule study but she never did.  Pt denies FHx of colorectal, gastric cancer, PUD.     Allergies:  No Known Allergies      Home Medications:    Hospital Medications:  doxazosin 1 milliGRAM(s) Oral at bedtime  isosorbide   mononitrate ER Tablet (IMDUR) 60 milliGRAM(s) Oral daily  gabapentin 300 milliGRAM(s) Oral daily  atorvastatin 80 milliGRAM(s) Oral at bedtime  pantoprazole    Tablet 40 milliGRAM(s) Oral two times a day before meals  losartan 100 milliGRAM(s) Oral daily      PMHX/PSHX:  Gastric ulcer, unspecified as acute or chronic, without hemorrhage or perforation  Acute stomach ulcer  PVD (peripheral vascular disease)  GIB (gastrointestinal bleeding)  HTN (hypertension)  TIA (transient ischemic attack)  History of operative procedure on hip  Varicose veins  S/P tonsillectomy      Family history:  No pertinent family history in first degree relatives      Social History: Denies alcohol, cigarette drug use. No recent travel, sick contact    ROS:     General:  No wt loss, fevers, chills, night sweats, fatigue,   Eyes:  Good vision, no reported pain  ENT:  No sore throat, pain, runny nose, dysphagia, + mild dizziness  CV:  No pain, palpitations, hypo/hypertension  Resp:  No dyspnea, cough, tachypnea, wheezing  GI:  See HPI  :  No pain, bleeding, incontinence, nocturia  Muscle:  No pain, weakness  Neuro:  No weakness, tingling, memory problems  Psych:  No fatigue, insomnia, mood problems, depression  Endocrine:  No polyuria, polydipsia, cold/heat intolerance  Heme:  No petechiae, ecchymosis, easy bruisability  Skin:  No rash, edema      PHYSICAL EXAM:     GENERAL:  Appears stated age, well-groomed, well-nourished, no distress  HEENT:  NC/AT,  conjunctivae clear and pink,  no JVD,   CHEST:  Full & symmetric excursion, no increased effort, breath sounds clear  HEART:  Regular rhythm, S1, S2, no murmur/rub/S3/S4, no abdominal bruit, no edema  ABDOMEN:  Soft, TTP at RLQ and LLQ where intervention was performed, non-distended, normoactive bowel sounds,  no masses ,  EXTREMITIES:  no cyanosis,clubbing or edema  SKIN:  No rash/erythema/ecchymoses/petechiae/wounds/abscess/warm/dry  NEURO:  Alert, oriented,     Vital Signs:  Vital Signs Last 24 Hrs  T(C): 37.1 (07 Aug 2017 11:11), Max: 37.5 (07 Aug 2017 05:07)  T(F): 98.8 (07 Aug 2017 11:11), Max: 99.5 (07 Aug 2017 05:07)  HR: 77 (07 Aug 2017 11:11) (67 - 78)  BP: 162/72 (07 Aug 2017 11:11) (160/72 - 174/74)  BP(mean): --  RR: 18 (07 Aug 2017 11:11) (18 - 18)  SpO2: 94% (07 Aug 2017 11:11) (94% - 96%)  Daily     Daily     LABS:                        7.8    5.82  )-----------( 220      ( 07 Aug 2017 07:13 )             25.2     08-07    139  |  104  |  14  ----------------------------<  103<H>  4.2   |  21<L>  |  0.98    Ca    9.2      07 Aug 2017 07:13      Imaging:    < from: CT Abdomen and Pelvis No Cont (08.04.17 @ 06:02) >  IMPRESSION:  Small left retroperitoneal hematoma.      These findings were discussed with MARSHALL Durán at 7:09 AM on 8/4/2017 by   Dr. Dominguez.    < end of copied text >      < from: Colonoscopy (05.15.17 @ 17:24) >  Impression:          - Anal fissure found on perianal exam.                       - Diverticulosis in the sigmoid colon and in the                        descending colon.                       - The examined portion of the ileum was normal.                       - Given bowel preparation, inadequate examination for                        colorectal cancer screening. Masses could have been                        missed.  Recommendation:      - If screening colonscopy desired, patient will need to                        d/c Plavix mik-procedurally. Can discuss with primary                        care physician.                       - Diet as tolerated.                       - Consider inpatient vs outpatient capsule endoscopy.                       - The findings and recommendations were discussed with                        the patient.                       - The findings and recommendations were discussed with                        the patient's . Copy of report given to them.    < end of copied text >    < from: Upper Endoscopy (05.15.17 @ 17:22) >  Impression:          - Hiatus hernia.                       - Non-bleeding gastric ulcer with no stigmata of                        bleeding.                   - Erythematous mucosa in the antrum and duodenum.                       - Biopsies not taken given recent Plavix.  Recommendation:      - -Diet as tolerated                       - Consider Capsule endoscopy as inpatient versus                    outpatient                       - Repeat biopsies of ulcer would be advised when patient                        off Plavix to confirm no neoplasm/H pylori. Can occur as                        inpatient or outpatient. Outpatient f/u 1 month.                        [phone]. Could consider EUS as well.                       - Antiplatelets per primary team.                       - PPI daily.                       - Check Stool H pylori stool ag/serology.                       - The findings and recommendations were discussed with                        the patient. Patient given copy of this report.                       - The findings and recommendations were discussed with                        the patient's family.     < end of copied text > Chief Complaint:  Patient is a 85y old  Female who presents with a chief complaint of dizziness (05 Aug 2017 08:08)      HPI: 85 F h/o TIA (baseline mild confusion), HTN, anemia, h/o gastric ulcer (5/17 admission for UGIB) presented on 08/04 with an episode of dizziness following an IR procedure to intervene on severe PVD.    On admission, H/H was 6.8 (baseline 9-10), pt received 1u prbc with appropriate response (7.6) on 08/03. Pt received another 1u prbc for hgb of 7, also with appropriate response. Bleeding was thought to be 2/2 to L retroperitoneal hematoma seen on imaging. Today, pt endorses seeing black stool along with brown stool today which is the first time it has happened since admission. Pt denies BRBPR, hematemesis, fever, chills, dysphagia, odynophagia, headache, bloating sensation, early satiety, weight loss.    Of note, during her hospital stay in 05/2017, EGD and colonoscopy showed diverticulosis in the sigmoid colon and in the descending colon, hiatal hernia, non-bleeding gastric ulcer with no stigmata of bleeding, erythematous mucosa in the antrum and duodenum. Pt was told to follow up with outpatient gastroenterology for a capsule study which she never did.  Pt denies FHx of colorectal, gastric cancer, PUD.     Allergies:  No Known Allergies      Home Medications:    Hospital Medications:  doxazosin 1 milliGRAM(s) Oral at bedtime  isosorbide   mononitrate ER Tablet (IMDUR) 60 milliGRAM(s) Oral daily  gabapentin 300 milliGRAM(s) Oral daily  atorvastatin 80 milliGRAM(s) Oral at bedtime  pantoprazole    Tablet 40 milliGRAM(s) Oral two times a day before meals  losartan 100 milliGRAM(s) Oral daily      PMHX/PSHX:  Gastric ulcer, unspecified as acute or chronic, without hemorrhage or perforation  Acute stomach ulcer  PVD (peripheral vascular disease)  GIB (gastrointestinal bleeding)  HTN (hypertension)  TIA (transient ischemic attack)  History of operative procedure on hip  Varicose veins  S/P tonsillectomy      Family history:  No pertinent family history in first degree relatives      Social History: Denies alcohol, cigarette drug use. No recent travel, sick contact    ROS:     General:  No wt loss, fevers, chills, night sweats, fatigue,   Eyes:  Good vision, no reported pain  ENT:  No sore throat, pain, runny nose, dysphagia, + mild dizziness  CV:  No pain, palpitations, hypo/hypertension  Resp:  No dyspnea, cough, tachypnea, wheezing  GI:  See HPI  :  No pain, bleeding, incontinence, nocturia  Muscle:  No pain, weakness  Neuro:  No weakness, tingling, memory problems  Psych:  No fatigue, insomnia, mood problems, depression  Endocrine:  No polyuria, polydipsia, cold/heat intolerance  Heme:  No petechiae, ecchymosis, easy bruisability  Skin:  No rash, edema      PHYSICAL EXAM:     GENERAL:  Appears stated age, well-groomed, well-nourished, no distress  HEENT:  NC/AT,  conjunctivae clear and pink,  no JVD,   CHEST:  Full & symmetric excursion, no increased effort, breath sounds clear  HEART:  Regular rhythm, S1, S2, no murmur/rub/S3/S4, no abdominal bruit, no edema  ABDOMEN:  Soft, TTP at RLQ and LLQ where intervention was performed, non-distended, normoactive bowel sounds,  no masses ,  EXTREMITIES:  no cyanosis,clubbing or edema  SKIN:  No rash/erythema/ecchymoses/petechiae/wounds/abscess/warm/dry  NEURO:  Alert, oriented,     Vital Signs:  Vital Signs Last 24 Hrs  T(C): 37.1 (07 Aug 2017 11:11), Max: 37.5 (07 Aug 2017 05:07)  T(F): 98.8 (07 Aug 2017 11:11), Max: 99.5 (07 Aug 2017 05:07)  HR: 77 (07 Aug 2017 11:11) (67 - 78)  BP: 162/72 (07 Aug 2017 11:11) (160/72 - 174/74)  BP(mean): --  RR: 18 (07 Aug 2017 11:11) (18 - 18)  SpO2: 94% (07 Aug 2017 11:11) (94% - 96%)  Daily     Daily     LABS:                        7.8    5.82  )-----------( 220      ( 07 Aug 2017 07:13 )             25.2     08-07    139  |  104  |  14  ----------------------------<  103<H>  4.2   |  21<L>  |  0.98    Ca    9.2      07 Aug 2017 07:13      Imaging:    < from: CT Abdomen and Pelvis No Cont (08.04.17 @ 06:02) >  IMPRESSION:  Small left retroperitoneal hematoma.      These findings were discussed with MARSHALL Durán at 7:09 AM on 8/4/2017 by   Dr. Dominguez.    < end of copied text >      < from: Colonoscopy (05.15.17 @ 17:24) >  Impression:          - Anal fissure found on perianal exam.                       - Diverticulosis in the sigmoid colon and in the                        descending colon.                       - The examined portion of the ileum was normal.                       - Given bowel preparation, inadequate examination for                        colorectal cancer screening. Masses could have been                        missed.  Recommendation:      - If screening colonscopy desired, patient will need to                        d/c Plavix mik-procedurally. Can discuss with primary                        care physician.                       - Diet as tolerated.                       - Consider inpatient vs outpatient capsule endoscopy.                       - The findings and recommendations were discussed with                        the patient.                       - The findings and recommendations were discussed with                        the patient's . Copy of report given to them.    < end of copied text >    < from: Upper Endoscopy (05.15.17 @ 17:22) >  Impression:          - Hiatus hernia.                       - Non-bleeding gastric ulcer with no stigmata of                        bleeding.                   - Erythematous mucosa in the antrum and duodenum.                       - Biopsies not taken given recent Plavix.  Recommendation:      - -Diet as tolerated                       - Consider Capsule endoscopy as inpatient versus                    outpatient                       - Repeat biopsies of ulcer would be advised when patient                        off Plavix to confirm no neoplasm/H pylori. Can occur as                        inpatient or outpatient. Outpatient f/u 1 month.                        [phone]. Could consider EUS as well.                       - Antiplatelets per primary team.                       - PPI daily.                       - Check Stool H pylori stool ag/serology.                       - The findings and recommendations were discussed with                        the patient. Patient given copy of this report.                       - The findings and recommendations were discussed with                        the patient's family.     < end of copied text >

## 2017-08-07 NOTE — CONSULT NOTE ADULT - ATTENDING COMMENTS
Patient seen and examined. Agree with Resident's assessment and plan. 85 year old with recent hx of PUD found to have anemia. Rec repeat EGD and if negative, proceed with small bowel capsule study. Risks benefits of procedures explained to patient and her  at bedside. Both stated understanding to above and agreed to proceed.

## 2017-08-07 NOTE — PROGRESS NOTE ADULT - SUBJECTIVE AND OBJECTIVE BOX
SURGERY DAILY PROGRESS NOTE:       Overnight Events: Patient is hemodynamically stable, no more transfusions, appears forgetfully this morning, no complaints.       Physical Exam  Vital Signs Last 24 Hrs  T(C): 37.5 (07 Aug 2017 05:07), Max: 37.5 (07 Aug 2017 05:07)  T(F): 99.5 (07 Aug 2017 05:07), Max: 99.5 (07 Aug 2017 05:07)  HR: 75 (07 Aug 2017 05:07) (67 - 82)  BP: 174/74 (07 Aug 2017 05:07) (160/72 - 174/74)  BP(mean): --  RR: 18 (07 Aug 2017 05:07) (18 - 18)  SpO2: 94% (07 Aug 2017 05:07) (94% - 96%)    Gen: NAD, alert and interactive,   HEENT: normocephalic, atraumatic, no scleral icterus  CV: S1, S2, RRR  Pulm: CTA B/L  Abd: Soft, ND, NTP, no rebound, no guarding, no palpable organomegaly/masses  Ext: warm, no edema, palp dp/pt    I&O's Detail    06 Aug 2017 07:01  -  07 Aug 2017 07:00  --------------------------------------------------------  IN:    Oral Fluid: 480 mL  Total IN: 480 mL    OUT:    Voided: 650 mL  Total OUT: 650 mL    Total NET: -170 mL          Labs:                        7.8    5.82  )-----------( 220      ( 07 Aug 2017 07:13 )             25.2     08-07    139  |  104  |  14  ----------------------------<  103<H>  4.2   |  21<L>  |  0.98    Ca    9.2      07 Aug 2017 07:13        Assesment/Plan  84 y/o female with small left retroperitoneal hematoma following IR access for lower extremity angioplasty    - Pt s/p 2 PRBC with appropriate response; currently hemodynamically stable.   - Continue to monitor vitals & H/H.   - C/w supportive care per primary team, no acute surgical intervention at this time.  - please contact us with further questions.  - D/w Dr. Son & pt  - Surgery Red Team [id number] SURGERY DAILY PROGRESS NOTE:       Overnight Events: Patient is hemodynamically stable, no more transfusions, appears forgetfully this morning, no complaints.       Physical Exam  Vital Signs Last 24 Hrs  T(C): 37.5 (07 Aug 2017 05:07), Max: 37.5 (07 Aug 2017 05:07)  T(F): 99.5 (07 Aug 2017 05:07), Max: 99.5 (07 Aug 2017 05:07)  HR: 75 (07 Aug 2017 05:07) (67 - 82)  BP: 174/74 (07 Aug 2017 05:07) (160/72 - 174/74)  BP(mean): --  RR: 18 (07 Aug 2017 05:07) (18 - 18)  SpO2: 94% (07 Aug 2017 05:07) (94% - 96%)    Gen: NAD, alert and interactive,   HEENT: normocephalic, atraumatic, no scleral icterus  CV: S1, S2, RRR  Pulm: CTA B/L  Abd: Soft, ND, NTP, no rebound, no guarding, no palpable organomegaly/masses  Ext: warm, no edema, palp dp/pt    I&O's Detail    06 Aug 2017 07:01  -  07 Aug 2017 07:00  --------------------------------------------------------  IN:    Oral Fluid: 480 mL  Total IN: 480 mL    OUT:    Voided: 650 mL  Total OUT: 650 mL    Total NET: -170 mL          Labs:                        7.8    5.82  )-----------( 220      ( 07 Aug 2017 07:13 )             25.2     08-07    139  |  104  |  14  ----------------------------<  103<H>  4.2   |  21<L>  |  0.98    Ca    9.2      07 Aug 2017 07:13        Assesment/Plan  86 y/o female with small left retroperitoneal hematoma following IR access for lower extremity angioplasty    - Pt s/p 2 PRBC with appropriate response; currently hemodynamically stable.   - Continue to monitor vitals & H/H.   - f/u GI consult for possible EGD/capsule ensoscopy  - C/w supportive care per primary team, no acute surgical intervention at this time.  - please contact us with further questions.  - D/w Dr. Son & pt  - Surgery Red Team [id number]

## 2017-08-07 NOTE — PROGRESS NOTE ADULT - SUBJECTIVE AND OBJECTIVE BOX
Patient is a 85y old  Female who presents with a chief complaint of dizziness (05 Aug 2017 08:08)      SUBJECTIVE / OVERNIGHT EVENTS: no abd pain, no dizz    MEDICATIONS  (STANDING):  doxazosin 1 milliGRAM(s) Oral at bedtime  isosorbide   mononitrate ER Tablet (IMDUR) 60 milliGRAM(s) Oral daily  gabapentin 300 milliGRAM(s) Oral daily  atorvastatin 80 milliGRAM(s) Oral at bedtime  pantoprazole    Tablet 40 milliGRAM(s) Oral two times a day before meals  losartan 100 milliGRAM(s) Oral daily    MEDICATIONS  (PRN):      Vital Signs Last 24 Hrs  T(F): 99.5 (08-07-17 @ 05:07), Max: 99.5 (08-07-17 @ 05:07)  HR: 75 (08-07-17 @ 05:07) (67 - 82)  BP: 174/74 (08-07-17 @ 05:07) (160/72 - 174/74)  RR: 18 (08-07-17 @ 05:07) (18 - 18)  SpO2: 94% (08-07-17 @ 05:07) (94% - 96%)  Telemetry:   CAPILLARY BLOOD GLUCOSE        I&O's Summary    06 Aug 2017 07:01  -  07 Aug 2017 07:00  --------------------------------------------------------  IN: 480 mL / OUT: 650 mL / NET: -170 mL        PHYSICAL EXAM:  GENERAL: NAD, well-developed  HEAD:  Atraumatic, Normocephalic  EYES: EOMI, PERRLA, conjunctiva and sclera clear  NECK: Supple, No JVD  CHEST/LUNG: Clear to auscultation bilaterally; No wheeze  HEART: Regular rate and rhythm; No murmurs, rubs, or gallops  ABDOMEN: Soft, Nontender, Nondistended; Bowel sounds present  EXTREMITIES:  2+ Peripheral Pulses, No clubbing, cyanosis, or edema  PSYCH: AAOx3  NEUROLOGY: non-focal  SKIN: No rashes or lesions    LABS:                        7.8    5.82  )-----------( 220      ( 07 Aug 2017 07:13 )             25.2     08-05    141  |  106  |  14  ----------------------------<  112<H>  4.1   |  20<L>  |  0.90    Ca    9.3      05 Aug 2017 09:19                RADIOLOGY & ADDITIONAL TESTS:    Imaging Personally Reviewed:    Consultant(s) Notes Reviewed:      Care Discussed with Consultants/Other Providers:

## 2017-08-07 NOTE — CHART NOTE - NSCHARTNOTEFT_GEN_A_CORE
Per Rn. Pt had bm with brown stool and some black stool. Guaiac ordered. Pt given venofer today. [facility] called for consult. Pt without any acute complaints at this time.  Continue to monitor. D/w Dr. Cobos.      Vital Signs Last 24 Hrs  T(C): 37.1 (07 Aug 2017 11:11), Max: 37.5 (07 Aug 2017 05:07)  T(F): 98.8 (07 Aug 2017 11:11), Max: 99.5 (07 Aug 2017 05:07)  HR: 77 (07 Aug 2017 11:11) (67 - 78)  BP: 162/72 (07 Aug 2017 11:11) (160/72 - 174/74)  BP(mean): --  RR: 18 (07 Aug 2017 11:11) (18 - 18)  SpO2: 94% (07 Aug 2017 11:11) (94% - 96%)                       7.8    5.82  )-----------( 220      ( 07 Aug 2017 07:13 )             25.2

## 2017-08-08 ENCOUNTER — RESULT REVIEW (OUTPATIENT)
Age: 82
End: 2017-08-08

## 2017-08-08 LAB
HCT VFR BLD CALC: 27.6 % — LOW (ref 34.5–45)
HGB BLD-MCNC: 8.9 G/DL — LOW (ref 11.5–15.5)
MCHC RBC-ENTMCNC: 28.5 PG — SIGNIFICANT CHANGE UP (ref 27–34)
MCHC RBC-ENTMCNC: 32.3 GM/DL — SIGNIFICANT CHANGE UP (ref 32–36)
MCV RBC AUTO: 88.1 FL — SIGNIFICANT CHANGE UP (ref 80–100)
PLATELET # BLD AUTO: 223 K/UL — SIGNIFICANT CHANGE UP (ref 150–400)
RBC # BLD: 3.13 M/UL — LOW (ref 3.8–5.2)
RBC # FLD: 17.5 % — HIGH (ref 10.3–14.5)
WBC # BLD: 7 K/UL — SIGNIFICANT CHANGE UP (ref 3.8–10.5)
WBC # FLD AUTO: 7 K/UL — SIGNIFICANT CHANGE UP (ref 3.8–10.5)

## 2017-08-08 PROCEDURE — 73130 X-RAY EXAM OF HAND: CPT | Mod: 26,LT

## 2017-08-08 PROCEDURE — 88305 TISSUE EXAM BY PATHOLOGIST: CPT | Mod: 26

## 2017-08-08 PROCEDURE — 43239 EGD BIOPSY SINGLE/MULTIPLE: CPT | Mod: GC

## 2017-08-08 PROCEDURE — 88312 SPECIAL STAINS GROUP 1: CPT | Mod: 26

## 2017-08-08 RX ORDER — METOPROLOL TARTRATE 50 MG
25 TABLET ORAL ONCE
Qty: 0 | Refills: 0 | Status: COMPLETED | OUTPATIENT
Start: 2017-08-08 | End: 2017-08-08

## 2017-08-08 RX ADMIN — LOSARTAN POTASSIUM 100 MILLIGRAM(S): 100 TABLET, FILM COATED ORAL at 06:50

## 2017-08-08 RX ADMIN — GABAPENTIN 300 MILLIGRAM(S): 400 CAPSULE ORAL at 17:01

## 2017-08-08 RX ADMIN — PANTOPRAZOLE SODIUM 40 MILLIGRAM(S): 20 TABLET, DELAYED RELEASE ORAL at 06:50

## 2017-08-08 RX ADMIN — PANTOPRAZOLE SODIUM 40 MILLIGRAM(S): 20 TABLET, DELAYED RELEASE ORAL at 17:01

## 2017-08-08 RX ADMIN — ATORVASTATIN CALCIUM 80 MILLIGRAM(S): 80 TABLET, FILM COATED ORAL at 21:06

## 2017-08-08 RX ADMIN — ISOSORBIDE MONONITRATE 60 MILLIGRAM(S): 60 TABLET, EXTENDED RELEASE ORAL at 11:49

## 2017-08-08 RX ADMIN — Medication 1 MILLIGRAM(S): at 21:06

## 2017-08-08 RX ADMIN — Medication 25 MILLIGRAM(S): at 23:51

## 2017-08-08 NOTE — PROGRESS NOTE ADULT - SUBJECTIVE AND OBJECTIVE BOX
Patient is a 85y old  Female who presents with a chief complaint of dizziness (05 Aug 2017 08:08)      SUBJECTIVE / OVERNIGHT EVENTS: no new c/o    MEDICATIONS  (STANDING):  doxazosin 1 milliGRAM(s) Oral at bedtime  isosorbide   mononitrate ER Tablet (IMDUR) 60 milliGRAM(s) Oral daily  gabapentin 300 milliGRAM(s) Oral daily  atorvastatin 80 milliGRAM(s) Oral at bedtime  pantoprazole    Tablet 40 milliGRAM(s) Oral two times a day before meals  losartan 100 milliGRAM(s) Oral daily    MEDICATIONS  (PRN):      Vital Signs Last 24 Hrs  T(F): 98 (08-08-17 @ 15:57), Max: 99 (08-07-17 @ 20:01)  HR: 77 (08-08-17 @ 15:57) (72 - 79)  BP: 168/76 (08-08-17 @ 15:57) (152/77 - 169/78)  RR: 18 (08-08-17 @ 15:57) (18 - 18)  SpO2: 94% (08-08-17 @ 15:57) (94% - 96%)  Telemetry:   CAPILLARY BLOOD GLUCOSE        I&O's Summary    07 Aug 2017 07:01  -  08 Aug 2017 07:00  --------------------------------------------------------  IN: 1420 mL / OUT: 0 mL / NET: 1420 mL    08 Aug 2017 07:01  -  08 Aug 2017 19:48  --------------------------------------------------------  IN: 300 mL / OUT: 0 mL / NET: 300 mL        PHYSICAL EXAM:  GENERAL: NAD, well-developed  HEAD:  Atraumatic, Normocephalic  EYES: EOMI, PERRLA, conjunctiva and sclera clear  NECK: Supple, No JVD  CHEST/LUNG: Clear to auscultation bilaterally; No wheeze  HEART: Regular rate and rhythm; No murmurs, rubs, or gallops  ABDOMEN: Soft, Nontender, Nondistended; Bowel sounds present  EXTREMITIES:  2+ Peripheral Pulses, No clubbing, cyanosis, or edema  PSYCH: AAOx3  NEUROLOGY: non-focal  SKIN: No rashes or lesions    LABS:                        8.9    7.0   )-----------( 212      ( 07 Aug 2017 19:12 )             27.5     08-07    139  |  104  |  14  ----------------------------<  103<H>  4.2   |  21<L>  |  0.98    Ca    9.2      07 Aug 2017 07:13                RADIOLOGY & ADDITIONAL TESTS:    Imaging Personally Reviewed:    Consultant(s) Notes Reviewed:      Care Discussed with Consultants/Other Providers:

## 2017-08-08 NOTE — PROGRESS NOTE ADULT - SUBJECTIVE AND OBJECTIVE BOX
Pre-Endoscopy Evaluation      Referring Physician: Siri Cobos MD                                    Procedure: EGD    Indication for Procedure: GIB    Pertinent History: 85 year old female with hx of TIA (baseline mild confusion), HTN, anemia, h/o non bleeding gastric ulcer (5/17 admission for UGIB) presented with dizziness, was found to have anemia.      Sedation by Anesthesia [X]    PAST MEDICAL & SURGICAL HISTORY:  Gastric ulcer, unspecified as acute or chronic, without hemorrhage or perforation  Acute stomach ulcer  PVD (peripheral vascular disease)  GIB (gastrointestinal bleeding)  HTN (hypertension)  TIA (transient ischemic attack)  History of operative procedure on hip: Right hip fracture s/p fall on 03/16.  Varicose veins: stripping  S/P tonsillectomy      PMH of Gastroparesis [ ]  Gastric Surgery [ ]  Gastric Outlet Obstruction [ ] no    Allergies:    No Known Allergies    Intolerances: none    Latex allergy: [ ] yes [x] no    Medications:MEDICATIONS  (STANDING):  doxazosin 1 milliGRAM(s) Oral at bedtime  isosorbide   mononitrate ER Tablet (IMDUR) 60 milliGRAM(s) Oral daily  gabapentin 300 milliGRAM(s) Oral daily  atorvastatin 80 milliGRAM(s) Oral at bedtime  pantoprazole    Tablet 40 milliGRAM(s) Oral two times a day before meals  losartan 100 milliGRAM(s) Oral daily    MEDICATIONS  (PRN):      Smoking: [ ] yes  [x] no    AICD/PPM: [ ] yes   [x] no    Pertinent lab data:                        8.9    7.0   )-----------( 212      ( 07 Aug 2017 19:12 )             27.5     08-07    139  |  104  |  14  ----------------------------<  103<H>  4.2   |  21<L>  |  0.98    Ca    9.2      07 Aug 2017 07:13      Physical Examination:  Daily Height in cm: 157.48 (08 Aug 2017 11:52)    Daily   Vital Signs Last 24 Hrs  T(C): 36.6 (08 Aug 2017 11:52), Max: 37.2 (07 Aug 2017 20:01)  T(F): 97.9 (08 Aug 2017 11:52), Max: 99 (07 Aug 2017 20:01)  HR: 72 (08 Aug 2017 11:52) (72 - 79)  BP: 152/77 (08 Aug 2017 11:52) (152/77 - 169/78)  BP(mean): --  RR: 18 (08 Aug 2017 11:52) (18 - 18)  SpO2: 94% (08 Aug 2017 11:52) (94% - 96%)    Constitutional: NAD    HEENT: PERRLA, EOMI,       Neck:  No JVD    Respiratory: CTAB/L    Cardiovascular: S1 and S2    Gastrointestinal: BS+, soft, NT/ND    Extremities: No peripheral edema    Comments:    ASA Class: I [ ]  II [ ]  III [X]  IV [ ]    The patient is a suitable candidate for the planned procedure unless box checked [ ]  No, explain:

## 2017-08-09 DIAGNOSIS — D62 ACUTE POSTHEMORRHAGIC ANEMIA: ICD-10-CM

## 2017-08-09 LAB
ANION GAP SERPL CALC-SCNC: 17 MMOL/L — SIGNIFICANT CHANGE UP (ref 5–17)
BUN SERPL-MCNC: 13 MG/DL — SIGNIFICANT CHANGE UP (ref 7–23)
CALCIUM SERPL-MCNC: 9.2 MG/DL — SIGNIFICANT CHANGE UP (ref 8.4–10.5)
CHLORIDE SERPL-SCNC: 102 MMOL/L — SIGNIFICANT CHANGE UP (ref 96–108)
CO2 SERPL-SCNC: 20 MMOL/L — LOW (ref 22–31)
CREAT SERPL-MCNC: 0.89 MG/DL — SIGNIFICANT CHANGE UP (ref 0.5–1.3)
GLUCOSE SERPL-MCNC: 77 MG/DL — SIGNIFICANT CHANGE UP (ref 70–99)
HCT VFR BLD CALC: 25.9 % — LOW (ref 34.5–45)
HGB BLD-MCNC: 8 G/DL — LOW (ref 11.5–15.5)
MCHC RBC-ENTMCNC: 26.4 PG — LOW (ref 27–34)
MCHC RBC-ENTMCNC: 30.9 GM/DL — LOW (ref 32–36)
MCV RBC AUTO: 85.5 FL — SIGNIFICANT CHANGE UP (ref 80–100)
PLATELET # BLD AUTO: 254 K/UL — SIGNIFICANT CHANGE UP (ref 150–400)
POTASSIUM SERPL-MCNC: 4.2 MMOL/L — SIGNIFICANT CHANGE UP (ref 3.5–5.3)
POTASSIUM SERPL-SCNC: 4.2 MMOL/L — SIGNIFICANT CHANGE UP (ref 3.5–5.3)
RBC # BLD: 3.03 M/UL — LOW (ref 3.8–5.2)
RBC # FLD: 18.1 % — HIGH (ref 10.3–14.5)
SODIUM SERPL-SCNC: 139 MMOL/L — SIGNIFICANT CHANGE UP (ref 135–145)
SURGICAL PATHOLOGY STUDY: SIGNIFICANT CHANGE UP
WBC # BLD: 6.17 K/UL — SIGNIFICANT CHANGE UP (ref 3.8–10.5)
WBC # FLD AUTO: 6.17 K/UL — SIGNIFICANT CHANGE UP (ref 3.8–10.5)

## 2017-08-09 RX ORDER — ACETAMINOPHEN 500 MG
650 TABLET ORAL EVERY 6 HOURS
Qty: 0 | Refills: 0 | Status: DISCONTINUED | OUTPATIENT
Start: 2017-08-09 | End: 2017-08-11

## 2017-08-09 RX ORDER — HYDRALAZINE HCL 50 MG
10 TABLET ORAL ONCE
Qty: 0 | Refills: 0 | Status: DISCONTINUED | OUTPATIENT
Start: 2017-08-09 | End: 2017-08-09

## 2017-08-09 RX ORDER — FUROSEMIDE 40 MG
40 TABLET ORAL DAILY
Qty: 0 | Refills: 0 | Status: DISCONTINUED | OUTPATIENT
Start: 2017-08-09 | End: 2017-08-11

## 2017-08-09 RX ORDER — AMLODIPINE BESYLATE 2.5 MG/1
10 TABLET ORAL DAILY
Qty: 0 | Refills: 0 | Status: DISCONTINUED | OUTPATIENT
Start: 2017-08-09 | End: 2017-08-11

## 2017-08-09 RX ORDER — AMLODIPINE BESYLATE 2.5 MG/1
5 TABLET ORAL DAILY
Qty: 0 | Refills: 0 | Status: DISCONTINUED | OUTPATIENT
Start: 2017-08-09 | End: 2017-08-09

## 2017-08-09 RX ADMIN — Medication 1 MILLIGRAM(S): at 21:31

## 2017-08-09 RX ADMIN — ATORVASTATIN CALCIUM 80 MILLIGRAM(S): 80 TABLET, FILM COATED ORAL at 21:31

## 2017-08-09 RX ADMIN — Medication 650 MILLIGRAM(S): at 16:51

## 2017-08-09 RX ADMIN — GABAPENTIN 300 MILLIGRAM(S): 400 CAPSULE ORAL at 11:12

## 2017-08-09 RX ADMIN — AMLODIPINE BESYLATE 5 MILLIGRAM(S): 2.5 TABLET ORAL at 11:20

## 2017-08-09 RX ADMIN — PANTOPRAZOLE SODIUM 40 MILLIGRAM(S): 20 TABLET, DELAYED RELEASE ORAL at 07:39

## 2017-08-09 RX ADMIN — ISOSORBIDE MONONITRATE 60 MILLIGRAM(S): 60 TABLET, EXTENDED RELEASE ORAL at 11:13

## 2017-08-09 RX ADMIN — Medication 0.1 MILLIGRAM(S): at 11:12

## 2017-08-09 RX ADMIN — LOSARTAN POTASSIUM 100 MILLIGRAM(S): 100 TABLET, FILM COATED ORAL at 05:01

## 2017-08-09 RX ADMIN — PANTOPRAZOLE SODIUM 40 MILLIGRAM(S): 20 TABLET, DELAYED RELEASE ORAL at 15:55

## 2017-08-09 RX ADMIN — Medication 40 MILLIGRAM(S): at 15:49

## 2017-08-09 RX ADMIN — Medication 650 MILLIGRAM(S): at 15:51

## 2017-08-09 NOTE — PROGRESS NOTE ADULT - SUBJECTIVE AND OBJECTIVE BOX
INTERVAL HPI/OVERNIGHT EVENTS:    STATUS POST:      POST OPERATIVE DAY #:     MEDICATIONS  (STANDING):  doxazosin 1 milliGRAM(s) Oral at bedtime  isosorbide   mononitrate ER Tablet (IMDUR) 60 milliGRAM(s) Oral daily  gabapentin 300 milliGRAM(s) Oral daily  atorvastatin 80 milliGRAM(s) Oral at bedtime  pantoprazole    Tablet 40 milliGRAM(s) Oral two times a day before meals  losartan 100 milliGRAM(s) Oral daily  furosemide    Tablet 40 milliGRAM(s) Oral daily  amLODIPine   Tablet 10 milliGRAM(s) Oral daily    MEDICATIONS  (PRN):  acetaminophen   Tablet. 650 milliGRAM(s) Oral every 6 hours PRN Mild Pain (1 - 3)      Vital Signs Last 24 Hrs  T(C): 36.7 (09 Aug 2017 20:53), Max: 37.3 (09 Aug 2017 09:45)  T(F): 98 (09 Aug 2017 20:53), Max: 99.2 (09 Aug 2017 09:45)  HR: 66 (09 Aug 2017 20:53) (66 - 76)  BP: 147/70 (09 Aug 2017 20:53) (147/70 - 220/70)  BP(mean): --  RR: 18 (09 Aug 2017 20:53) (18 - 20)  SpO2: 98% (09 Aug 2017 20:53) (95% - 98%)  I&O's Detail    08 Aug 2017 07:01  -  09 Aug 2017 07:00  --------------------------------------------------------  IN:    Oral Fluid: 300 mL  Total IN: 300 mL    OUT:  Total OUT: 0 mL    Total NET: 300 mL      09 Aug 2017 07:01  -  09 Aug 2017 23:22  --------------------------------------------------------  IN:    Oral Fluid: 480 mL  Total IN: 480 mL    OUT:  Total OUT: 0 mL    Total NET: 480 mL          REVIEW OF SYSTEMS:  CONSTITUTIONAL: No weakness, fevers or chills  EYES/ENT: No visual changes;  No vertigo or throat pain   NECK: No pain or stiffness  RESPIRATORY: No cough, wheezing, hemoptysis; No shortness of breath  CARDIOVASCULAR: No chest pain or palpitations  GASTROINTESTINAL: No abdominal or epigastric pain. No nausea, vomiting, or hematemesis; No diarrhea or constipation. No melena or hematochezia.  GENITOURINARY: No dysuria, frequency or hematuria  NEUROLOGICAL: No numbness or weakness  SKIN: No itching, burning, rashes, or lesions   All other review of systems is negative unless indicated above.    Physical Exam:  General: WN/WD NAD  Neurology: A&Ox3, nonfocal, TSANG x 4  Respiratory: CTA B/L  CV: RRR, S1S2, no murmurs, rubs or gallops  Abdominal: Soft, NT, ND +BS, Last BM  Extremities: No edema, + peripheral pulses        LABS:                        8.0    6.17  )-----------( 254      ( 09 Aug 2017 07:20 )             25.9     08-09    139  |  102  |  13  ----------------------------<  77  4.2   |  20<L>  |  0.89    Ca    9.2      09 Aug 2017 08:00            RADIOLOGY & ADDITIONAL STUDIES:

## 2017-08-09 NOTE — PROVIDER CONTACT NOTE (OTHER) - BACKGROUND
Patient admitted for anemia, has history of HTN.
Patient admitted for anemia has hx of HTN
ANEMIA. H.O FALL.
Admitted with Anemia
Patient admitted for anemia, has hx of HTN.
admitted with anemia and syncope. hx of HTN

## 2017-08-09 NOTE — PROVIDER CONTACT NOTE (OTHER) - ASSESSMENT
Patient alert, confused. No headache noted. Patient lying comfortably in bed.
Patient asymptomatic, sleeping
Pt resting in bed, family at bedside, denies pain, sob, change in status a&ox2 at baseline
A&Ox1
Patient asymptomatic, sleeping.
SAYS HAS PAIN. CALM. CONFUSED.
asymptomatic

## 2017-08-09 NOTE — PROVIDER CONTACT NOTE (OTHER) - DATE AND TIME:
08-Aug-2017 16:04
04-Aug-2017 06:45
04-Aug-2017 17:50
08-Aug-2017 23:21
09-Aug-2017 10:04
08-Aug-2017 21:02

## 2017-08-09 NOTE — PROVIDER CONTACT NOTE (OTHER) - REASON
HTN
High BP
Patient hypertensive
Patient hypertensive
Patient hypertensive.
Pt continues to have elevated BP
complaints of pain in left leg,left hand. MILD SWELLING LEFT WRIST SITE. Back after  Endoscopy.

## 2017-08-09 NOTE — PROGRESS NOTE ADULT - SUBJECTIVE AND OBJECTIVE BOX
Patient is a 85y old  Female who presents with a chief complaint of dizziness (05 Aug 2017 08:08)      SUBJECTIVE / OVERNIGHT EVENTS: c/o left hand pain, no Fx on xray. BP elevated    MEDICATIONS  (STANDING):  doxazosin 1 milliGRAM(s) Oral at bedtime  isosorbide   mononitrate ER Tablet (IMDUR) 60 milliGRAM(s) Oral daily  gabapentin 300 milliGRAM(s) Oral daily  atorvastatin 80 milliGRAM(s) Oral at bedtime  pantoprazole    Tablet 40 milliGRAM(s) Oral two times a day before meals  losartan 100 milliGRAM(s) Oral daily  furosemide    Tablet 40 milliGRAM(s) Oral daily  amLODIPine   Tablet 10 milliGRAM(s) Oral daily    MEDICATIONS  (PRN):  acetaminophen   Tablet. 650 milliGRAM(s) Oral every 6 hours PRN Mild Pain (1 - 3)      Vital Signs Last 24 Hrs  T(F): 98 (08-09-17 @ 20:53), Max: 99.2 (08-09-17 @ 09:45)  HR: 66 (08-09-17 @ 20:53) (66 - 84)  BP: 147/70 (08-09-17 @ 20:53) (147/70 - 220/70)  RR: 18 (08-09-17 @ 20:53) (18 - 20)  SpO2: 98% (08-09-17 @ 20:53) (95% - 98%)  Telemetry:   CAPILLARY BLOOD GLUCOSE        I&O's Summary    08 Aug 2017 07:01  -  09 Aug 2017 07:00  --------------------------------------------------------  IN: 300 mL / OUT: 0 mL / NET: 300 mL    09 Aug 2017 07:01  -  09 Aug 2017 21:57  --------------------------------------------------------  IN: 480 mL / OUT: 0 mL / NET: 480 mL        PHYSICAL EXAM:  GENERAL: NAD, well-developed  HEAD:  Atraumatic, Normocephalic  EYES: EOMI, PERRLA, conjunctiva and sclera clear  NECK: Supple, No JVD  CHEST/LUNG: Clear to auscultation bilaterally; No wheeze  HEART: Regular rate and rhythm; No murmurs, rubs, or gallops  ABDOMEN: Soft, Nontender, Nondistended; Bowel sounds present  EXTREMITIES:  2+ Peripheral Pulses, No clubbing, cyanosis, or edema  PSYCH: AAOx3  NEUROLOGY: non-focal  SKIN: No rashes or lesions    LABS:                        8.0    6.17  )-----------( 254      ( 09 Aug 2017 07:20 )             25.9     08-09    139  |  102  |  13  ----------------------------<  77  4.2   |  20<L>  |  0.89    Ca    9.2      09 Aug 2017 08:00                RADIOLOGY & ADDITIONAL TESTS:    Imaging Personally Reviewed:    Consultant(s) Notes Reviewed:      Care Discussed with Consultants/Other Providers:

## 2017-08-09 NOTE — PROGRESS NOTE ADULT - ASSESSMENT
pt seen and examined with surgical housestaff  no acute surgical issues  HD stable after tx, h/h improved  f/u gi re egd  dc planning per med  reconsult prn

## 2017-08-09 NOTE — PROVIDER CONTACT NOTE (OTHER) - SITUATION
Patient hypertensive with /76
Patient hypertensive with /60
Patient hypertensive after cardura with /66
Pt blood pressure manually 200/80. BP meds due @ 1200
Pt with elevated BP throughout day, pmh of HTN, NP aware med held due to kidnesy function per NP Mello. Pt dx with amenia 8/4
SEE ABOVE NOTE.
pt has manually BP of 188/60

## 2017-08-09 NOTE — PROVIDER CONTACT NOTE (OTHER) - ACTION/TREATMENT ORDERED:
LASHON Sarkar aware, okay to give losartan at this time, will continue to monitor.
NP to come and evaluate patient.
OK to give 1200 Imdur now
will look into chart and orders
Give 10 pm BP meds, recheck vitals
Lopressor PO to be ordered.
SAID WILL SEE HER.

## 2017-08-10 DIAGNOSIS — M19.042 PRIMARY OSTEOARTHRITIS, LEFT HAND: ICD-10-CM

## 2017-08-10 LAB
HCT VFR BLD CALC: 26.2 % — LOW (ref 34.5–45)
HGB BLD-MCNC: 8.1 G/DL — LOW (ref 11.5–15.5)
MCHC RBC-ENTMCNC: 26.2 PG — LOW (ref 27–34)
MCHC RBC-ENTMCNC: 30.9 GM/DL — LOW (ref 32–36)
MCV RBC AUTO: 84.8 FL — SIGNIFICANT CHANGE UP (ref 80–100)
OB PNL STL: NEGATIVE — SIGNIFICANT CHANGE UP
PLATELET # BLD AUTO: 257 K/UL — SIGNIFICANT CHANGE UP (ref 150–400)
RBC # BLD: 3.09 M/UL — LOW (ref 3.8–5.2)
RBC # FLD: 18.2 % — HIGH (ref 10.3–14.5)
WBC # BLD: 5.44 K/UL — SIGNIFICANT CHANGE UP (ref 3.8–10.5)
WBC # FLD AUTO: 5.44 K/UL — SIGNIFICANT CHANGE UP (ref 3.8–10.5)

## 2017-08-10 PROCEDURE — 99223 1ST HOSP IP/OBS HIGH 75: CPT | Mod: GC

## 2017-08-10 RX ADMIN — Medication 650 MILLIGRAM(S): at 12:10

## 2017-08-10 RX ADMIN — Medication 1 MILLIGRAM(S): at 21:35

## 2017-08-10 RX ADMIN — Medication 650 MILLIGRAM(S): at 18:02

## 2017-08-10 RX ADMIN — PANTOPRAZOLE SODIUM 40 MILLIGRAM(S): 20 TABLET, DELAYED RELEASE ORAL at 05:31

## 2017-08-10 RX ADMIN — Medication 20 MILLIGRAM(S): at 17:57

## 2017-08-10 RX ADMIN — Medication 650 MILLIGRAM(S): at 05:36

## 2017-08-10 RX ADMIN — GABAPENTIN 300 MILLIGRAM(S): 400 CAPSULE ORAL at 11:10

## 2017-08-10 RX ADMIN — ISOSORBIDE MONONITRATE 60 MILLIGRAM(S): 60 TABLET, EXTENDED RELEASE ORAL at 11:10

## 2017-08-10 RX ADMIN — Medication 650 MILLIGRAM(S): at 18:44

## 2017-08-10 RX ADMIN — Medication 650 MILLIGRAM(S): at 11:34

## 2017-08-10 RX ADMIN — LOSARTAN POTASSIUM 100 MILLIGRAM(S): 100 TABLET, FILM COATED ORAL at 05:31

## 2017-08-10 RX ADMIN — ATORVASTATIN CALCIUM 80 MILLIGRAM(S): 80 TABLET, FILM COATED ORAL at 21:35

## 2017-08-10 RX ADMIN — AMLODIPINE BESYLATE 10 MILLIGRAM(S): 2.5 TABLET ORAL at 05:31

## 2017-08-10 RX ADMIN — Medication 650 MILLIGRAM(S): at 06:36

## 2017-08-10 RX ADMIN — PANTOPRAZOLE SODIUM 40 MILLIGRAM(S): 20 TABLET, DELAYED RELEASE ORAL at 16:20

## 2017-08-10 RX ADMIN — Medication 40 MILLIGRAM(S): at 09:55

## 2017-08-10 NOTE — PROGRESS NOTE ADULT - SUBJECTIVE AND OBJECTIVE BOX
Patient is a 85y old  Female who presents with a chief complaint of dizziness (05 Aug 2017 08:08)      SUBJECTIVE / OVERNIGHT EVENTS: left hand pain, Rheum input appreciated    MEDICATIONS  (STANDING):  doxazosin 1 milliGRAM(s) Oral at bedtime  isosorbide   mononitrate ER Tablet (IMDUR) 60 milliGRAM(s) Oral daily  gabapentin 300 milliGRAM(s) Oral daily  atorvastatin 80 milliGRAM(s) Oral at bedtime  pantoprazole    Tablet 40 milliGRAM(s) Oral two times a day before meals  losartan 100 milliGRAM(s) Oral daily  furosemide    Tablet 40 milliGRAM(s) Oral daily  amLODIPine   Tablet 10 milliGRAM(s) Oral daily  predniSONE   Tablet 20 milliGRAM(s) Oral daily    MEDICATIONS  (PRN):  acetaminophen   Tablet. 650 milliGRAM(s) Oral every 6 hours PRN Mild Pain (1 - 3)      Vital Signs Last 24 Hrs  T(F): 99 (08-10-17 @ 17:56), Max: 99 (08-10-17 @ 17:56)  HR: 68 (08-10-17 @ 17:56) (66 - 72)  BP: 147/61 (08-10-17 @ 17:56) (125/82 - 164/64)  RR: 18 (08-10-17 @ 17:56) (18 - 18)  SpO2: 95% (08-10-17 @ 17:56) (94% - 98%)  Telemetry:   CAPILLARY BLOOD GLUCOSE        I&O's Summary    09 Aug 2017 07:01  -  10 Aug 2017 07:00  --------------------------------------------------------  IN: 720 mL / OUT: 0 mL / NET: 720 mL    10 Aug 2017 07:01  -  10 Aug 2017 20:16  --------------------------------------------------------  IN: 1190 mL / OUT: 0 mL / NET: 1190 mL        PHYSICAL EXAM:  GENERAL: NAD, well-developed  HEAD:  Atraumatic, Normocephalic  EYES: EOMI, PERRLA, conjunctiva and sclera clear  NECK: Supple, No JVD  CHEST/LUNG: Clear to auscultation bilaterally; No wheeze  HEART: Regular rate and rhythm; No murmurs, rubs, or gallops  ABDOMEN: Soft, Nontender, Nondistended; Bowel sounds present  EXTREMITIES:  2+ Peripheral Pulses, No clubbing, cyanosis, or edema  PSYCH: AAOx3  NEUROLOGY: non-focal  SKIN: No rashes or lesions    LABS:                        8.1    5.44  )-----------( 257      ( 10 Aug 2017 07:39 )             26.2     08-09    139  |  102  |  13  ----------------------------<  77  4.2   |  20<L>  |  0.89    Ca    9.2      09 Aug 2017 08:00                RADIOLOGY & ADDITIONAL TESTS:    Imaging Personally Reviewed:    Consultant(s) Notes Reviewed:      Care Discussed with Consultants/Other Providers:

## 2017-08-10 NOTE — CONSULT NOTE ADULT - ASSESSMENT
Subacute monoarthritis, clinical presentation ( tenosynovitis, post trauma) and the presence of chondrocalcinosis on Xray of the hand make pseudogout the most likely etiology. The absence of fever, leukocytosis , and the preserved ROM make septic arthritis unlikely.   Recommend prednisone 20 mg po daily  Will follow up tomorrow   Discussed with housestaff Subacute monoarthritis, clinical presentation ( tenosynovitis, post trauma) and the presence of chondrocalcinosis on Xray of the hand make pseudogout the most likely etiology. The absence of fever, leukocytosis , and the preserved ROM make septic arthritis unlikely.     Recommend prednisone 20 mg po daily  Will follow up tomorrow   Discussed with housestaff

## 2017-08-10 NOTE — CONSULT NOTE ADULT - SUBJECTIVE AND OBJECTIVE BOX
GOOD THOMPSON  [id number]    HISTORY OF PRESENT ILLNESS: 84 yo W, admitted with dizziness and fall at home  Rheumatology consulted for evaluation of left hand swelling  The patient is unsure to when the pain and swelling of the left hand started but believes one week ago, prior to the fall. She denies any direct trauma or injury, no previous episodes, no fever/chills      PAST MEDICAL & SURGICAL HISTORY:  Gastric ulcer, unspecified as acute or chronic, without hemorrhage or perforation  Acute stomach ulcer  PVD (peripheral vascular disease)  GIB (gastrointestinal bleeding)  HTN (hypertension)  TIA (transient ischemic attack)  History of operative procedure on hip: Right hip fracture s/p fall on 03/16.  Varicose veins: stripping  S/P tonsillectomy      Review of Systems: as noted above     MEDICATIONS  (STANDING):  doxazosin 1 milliGRAM(s) Oral at bedtime  isosorbide   mononitrate ER Tablet (IMDUR) 60 milliGRAM(s) Oral daily  gabapentin 300 milliGRAM(s) Oral daily  atorvastatin 80 milliGRAM(s) Oral at bedtime  pantoprazole    Tablet 40 milliGRAM(s) Oral two times a day before meals  losartan 100 milliGRAM(s) Oral daily  furosemide    Tablet 40 milliGRAM(s) Oral daily  amLODIPine   Tablet 10 milliGRAM(s) Oral daily  predniSONE   Tablet 20 milliGRAM(s) Oral daily    MEDICATIONS  (PRN):  acetaminophen   Tablet. 650 milliGRAM(s) Oral every 6 hours PRN Mild Pain (1 - 3)      Allergies    No Known Allergies    Intolerances        PERTINENT MEDICATION HISTORY:    SOCIAL HISTORY:  OCCUPATION:  TRAVEL HISTORY:    FAMILY HISTORY:  No pertinent family history in first degree relatives      Vital Signs Last 24 Hrs  T(C): 36.9 (10 Aug 2017 09:56), Max: 37 (10 Aug 2017 04:43)  T(F): 98.4 (10 Aug 2017 09:56), Max: 98.6 (10 Aug 2017 04:43)  HR: 70 (10 Aug 2017 12:41) (66 - 72)  BP: 164/64 (10 Aug 2017 12:41) (125/82 - 164/64)  BP(mean): --  RR: 18 (10 Aug 2017 09:56) (18 - 18)  SpO2: 95% (10 Aug 2017 12:41) (94% - 98%)    Daily     Daily     Physical Exam:  General: No apparent distress  HEENT: EOMI, MMM  CVS: +S1/S2, RRR,  Resp: CTA b/l.   GI: Soft,  MSK: left hand swelling consistent with tenosynovitis , limited ROM at the wrist, no palpable effusion over the MCPs or PIPs, normal ROM at the MCPs/PIPs   no other joint tenderness or swelling     Skin: no visible rashes    LABS:                        8.1    5.44  )-----------( 257      ( 10 Aug 2017 07:39 )             26.2     08-09    139  |  102  |  13  ----------------------------<  77  4.2   |  20<L>  |  0.89    Ca    9.2      09 Aug 2017 08:00            RADIOLOGY & ADDITIONAL STUDIES: GOOD THOMPSON  [id number]    HISTORY OF PRESENT ILLNESS: 86 yo W, admitted with dizziness and fall at home  Rheumatology consulted for evaluation of left hand swelling  The patient is unsure to when the pain and swelling of the left hand started but believes one week ago, prior to the fall. She denies any direct trauma or injury, no previous episodes, no fever/chills      PAST MEDICAL & SURGICAL HISTORY:  Gastric ulcer, unspecified as acute or chronic, without hemorrhage or perforation  Acute stomach ulcer  PVD (peripheral vascular disease)  GIB (gastrointestinal bleeding)  HTN (hypertension)  TIA (transient ischemic attack)  History of operative procedure on hip: Right hip fracture s/p fall on 03/16.  Varicose veins: stripping  S/P tonsillectomy      Review of Systems: as noted above     MEDICATIONS  (STANDING):  doxazosin 1 milliGRAM(s) Oral at bedtime  isosorbide   mononitrate ER Tablet (IMDUR) 60 milliGRAM(s) Oral daily  gabapentin 300 milliGRAM(s) Oral daily  atorvastatin 80 milliGRAM(s) Oral at bedtime  pantoprazole    Tablet 40 milliGRAM(s) Oral two times a day before meals  losartan 100 milliGRAM(s) Oral daily  furosemide    Tablet 40 milliGRAM(s) Oral daily  amLODIPine   Tablet 10 milliGRAM(s) Oral daily  predniSONE   Tablet 20 milliGRAM(s) Oral daily    MEDICATIONS  (PRN):  acetaminophen   Tablet. 650 milliGRAM(s) Oral every 6 hours PRN Mild Pain (1 - 3)      Allergies    No Known Allergies    Intolerances        PERTINENT MEDICATION HISTORY:    SOCIAL HISTORY:  OCCUPATION:  TRAVEL HISTORY:    FAMILY HISTORY:  No pertinent family history in first degree relatives      Vital Signs Last 24 Hrs  T(C): 36.9 (10 Aug 2017 09:56), Max: 37 (10 Aug 2017 04:43)  T(F): 98.4 (10 Aug 2017 09:56), Max: 98.6 (10 Aug 2017 04:43)  HR: 70 (10 Aug 2017 12:41) (66 - 72)  BP: 164/64 (10 Aug 2017 12:41) (125/82 - 164/64)  BP(mean): --  RR: 18 (10 Aug 2017 09:56) (18 - 18)  SpO2: 95% (10 Aug 2017 12:41) (94% - 98%)    Daily     Daily     Physical Exam:  General: No apparent distress  HEENT: EOMI, MMM  CVS: +S1/S2, RRR,  Resp: CTA b/l.   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

## 2017-08-10 NOTE — PROGRESS NOTE ADULT - PROBLEM SELECTOR PLAN 5
cont doxazosin 1 mg  cont Losartan 100 mg  cont imdur 60 mg  cont Norvasc 10 mg  start Norvasc  restart lasix

## 2017-08-11 VITALS
OXYGEN SATURATION: 97 % | TEMPERATURE: 98 F | RESPIRATION RATE: 20 BRPM | HEART RATE: 86 BPM | DIASTOLIC BLOOD PRESSURE: 69 MMHG | SYSTOLIC BLOOD PRESSURE: 177 MMHG

## 2017-08-11 LAB
HCT VFR BLD CALC: 32 % — LOW (ref 34.5–45)
HGB BLD-MCNC: 9.5 G/DL — LOW (ref 11.5–15.5)
MCHC RBC-ENTMCNC: 26.5 PG — LOW (ref 27–34)
MCHC RBC-ENTMCNC: 29.6 GM/DL — LOW (ref 32–36)
MCV RBC AUTO: 89.6 FL — SIGNIFICANT CHANGE UP (ref 80–100)
PLATELET # BLD AUTO: 284 K/UL — SIGNIFICANT CHANGE UP (ref 150–400)
RBC # BLD: 3.57 M/UL — LOW (ref 3.8–5.2)
RBC # FLD: 17.8 % — HIGH (ref 10.3–14.5)
WBC # BLD: 4.3 K/UL — SIGNIFICANT CHANGE UP (ref 3.8–10.5)
WBC # FLD AUTO: 4.3 K/UL — SIGNIFICANT CHANGE UP (ref 3.8–10.5)

## 2017-08-11 PROCEDURE — 83605 ASSAY OF LACTIC ACID: CPT

## 2017-08-11 PROCEDURE — 36430 TRANSFUSION BLD/BLD COMPNT: CPT

## 2017-08-11 PROCEDURE — 85014 HEMATOCRIT: CPT

## 2017-08-11 PROCEDURE — 81001 URINALYSIS AUTO W/SCOPE: CPT

## 2017-08-11 PROCEDURE — 88312 SPECIAL STAINS GROUP 1: CPT

## 2017-08-11 PROCEDURE — 82947 ASSAY GLUCOSE BLOOD QUANT: CPT

## 2017-08-11 PROCEDURE — 99285 EMERGENCY DEPT VISIT HI MDM: CPT | Mod: 25

## 2017-08-11 PROCEDURE — 80053 COMPREHEN METABOLIC PANEL: CPT

## 2017-08-11 PROCEDURE — 73130 X-RAY EXAM OF HAND: CPT

## 2017-08-11 PROCEDURE — 85610 PROTHROMBIN TIME: CPT

## 2017-08-11 PROCEDURE — 74176 CT ABD & PELVIS W/O CONTRAST: CPT

## 2017-08-11 PROCEDURE — 97116 GAIT TRAINING THERAPY: CPT

## 2017-08-11 PROCEDURE — 82330 ASSAY OF CALCIUM: CPT

## 2017-08-11 PROCEDURE — 83735 ASSAY OF MAGNESIUM: CPT

## 2017-08-11 PROCEDURE — 88305 TISSUE EXAM BY PATHOLOGIST: CPT

## 2017-08-11 PROCEDURE — 80048 BASIC METABOLIC PNL TOTAL CA: CPT

## 2017-08-11 PROCEDURE — 84484 ASSAY OF TROPONIN QUANT: CPT

## 2017-08-11 PROCEDURE — 82803 BLOOD GASES ANY COMBINATION: CPT

## 2017-08-11 PROCEDURE — 86870 RBC ANTIBODY IDENTIFICATION: CPT

## 2017-08-11 PROCEDURE — 82435 ASSAY OF BLOOD CHLORIDE: CPT

## 2017-08-11 PROCEDURE — 83690 ASSAY OF LIPASE: CPT

## 2017-08-11 PROCEDURE — 83880 ASSAY OF NATRIURETIC PEPTIDE: CPT

## 2017-08-11 PROCEDURE — 84100 ASSAY OF PHOSPHORUS: CPT

## 2017-08-11 PROCEDURE — 82553 CREATINE MB FRACTION: CPT

## 2017-08-11 PROCEDURE — 70450 CT HEAD/BRAIN W/O DYE: CPT

## 2017-08-11 PROCEDURE — 87086 URINE CULTURE/COLONY COUNT: CPT

## 2017-08-11 PROCEDURE — 97162 PT EVAL MOD COMPLEX 30 MIN: CPT

## 2017-08-11 PROCEDURE — 86900 BLOOD TYPING SEROLOGIC ABO: CPT

## 2017-08-11 PROCEDURE — 86850 RBC ANTIBODY SCREEN: CPT

## 2017-08-11 PROCEDURE — 82272 OCCULT BLD FECES 1-3 TESTS: CPT

## 2017-08-11 PROCEDURE — 84295 ASSAY OF SERUM SODIUM: CPT

## 2017-08-11 PROCEDURE — 84132 ASSAY OF SERUM POTASSIUM: CPT

## 2017-08-11 PROCEDURE — 97530 THERAPEUTIC ACTIVITIES: CPT

## 2017-08-11 PROCEDURE — 93005 ELECTROCARDIOGRAM TRACING: CPT

## 2017-08-11 PROCEDURE — 85730 THROMBOPLASTIN TIME PARTIAL: CPT

## 2017-08-11 PROCEDURE — 82977 ASSAY OF GGT: CPT

## 2017-08-11 PROCEDURE — 86901 BLOOD TYPING SEROLOGIC RH(D): CPT

## 2017-08-11 PROCEDURE — 82550 ASSAY OF CK (CPK): CPT

## 2017-08-11 PROCEDURE — 85027 COMPLETE CBC AUTOMATED: CPT

## 2017-08-11 PROCEDURE — 71045 X-RAY EXAM CHEST 1 VIEW: CPT

## 2017-08-11 RX ORDER — ACETAMINOPHEN 500 MG
2 TABLET ORAL
Qty: 0 | Refills: 0 | COMMUNITY
Start: 2017-08-11

## 2017-08-11 RX ORDER — AMLODIPINE BESYLATE 2.5 MG/1
1 TABLET ORAL
Qty: 0 | Refills: 0 | COMMUNITY
Start: 2017-08-11

## 2017-08-11 RX ORDER — CLOPIDOGREL BISULFATE 75 MG/1
75 TABLET, FILM COATED ORAL DAILY
Qty: 0 | Refills: 0 | Status: DISCONTINUED | OUTPATIENT
Start: 2017-08-11 | End: 2017-08-11

## 2017-08-11 RX ORDER — ASPIRIN/CALCIUM CARB/MAGNESIUM 324 MG
81 TABLET ORAL DAILY
Qty: 0 | Refills: 0 | Status: DISCONTINUED | OUTPATIENT
Start: 2017-08-11 | End: 2017-08-11

## 2017-08-11 RX ADMIN — ISOSORBIDE MONONITRATE 60 MILLIGRAM(S): 60 TABLET, EXTENDED RELEASE ORAL at 13:38

## 2017-08-11 RX ADMIN — LOSARTAN POTASSIUM 100 MILLIGRAM(S): 100 TABLET, FILM COATED ORAL at 05:41

## 2017-08-11 RX ADMIN — GABAPENTIN 300 MILLIGRAM(S): 400 CAPSULE ORAL at 13:38

## 2017-08-11 RX ADMIN — Medication 81 MILLIGRAM(S): at 13:45

## 2017-08-11 RX ADMIN — PANTOPRAZOLE SODIUM 40 MILLIGRAM(S): 20 TABLET, DELAYED RELEASE ORAL at 05:41

## 2017-08-11 RX ADMIN — Medication 40 MILLIGRAM(S): at 05:41

## 2017-08-11 RX ADMIN — Medication 20 MILLIGRAM(S): at 05:41

## 2017-08-11 RX ADMIN — CLOPIDOGREL BISULFATE 75 MILLIGRAM(S): 75 TABLET, FILM COATED ORAL at 13:45

## 2017-08-11 RX ADMIN — AMLODIPINE BESYLATE 10 MILLIGRAM(S): 2.5 TABLET ORAL at 05:41

## 2017-08-11 NOTE — PROGRESS NOTE ADULT - PROBLEM SELECTOR PLAN 7
seen by Rheum, started on 20 mg prednisone for 2 days, now feels much better, start taper of Prednisone as per Rheum  LEONA planning to [place] today
seen by Rheum, started on 20 mg prednisone for 2 days

## 2017-08-11 NOTE — PROGRESS NOTE ADULT - PROBLEM SELECTOR PROBLEM 3
Chronic gastric ulcer without hemorrhage and without perforation

## 2017-08-11 NOTE — PROGRESS NOTE ADULT - PROBLEM SELECTOR PLAN 1
pt had extensive gi work up on last admission and found to have nonbleeding ulcer; this admission pt has no s/s GIB  concern given procedure on abdomen (? entry site given pvd intervention) for intra-abdominal bleeding  Ct A/P c/w retroperitoneal bleed. ASA/Plavix on hold for a week. if HH stable today DC home
pt had extensive gi work up on last admission and found to have nonbleeding ulcer; this admission pt has no s/s GIB  concern given procedure on abdomen (? entry site given pvd intervention) for intra-abdominal bleeding  Ct A/P c/w retroperitoneal bleed. ASA/Plavix on hold for a week. s/p 2UPRBC. if HH stable today,  DC home
pt had extensive gi work up on last admission and found to have nonbleeding ulcer; this admission pt has no s/s GIB  concern given procedure on abdomen (? entry site given pvd intervention) for intra-abdominal bleeding  Ct A/P c/w retroperitoneal bleed. ASA/Plavix on hold for a week. s/p 2UPRBC. if HH stable tommorow,  DC home
pt had extensive gi work up on last admission and found to have nonbleeding ulcer; this admission pt has no s/s GIB, but has dark stools. GI reconsulted.  Ct A/P c/w retroperitoneal bleed. ASA/Plavix cleared by GI to be restarted. s/p 2UPRBC on admission. HH stable. rpt EGD w gastritis and nonbleeding gastric ulcer, guiac neg. capsule to be done on outptn basis
pt had extensive gi work up on last admission and found to have nonbleeding ulcer; this admission pt has no s/s GIB, but has dark stools. GI reconsulted.  Ct A/P c/w retroperitoneal bleed. ASA/Plavix cleared by GI to be restarted. s/p 2UPRBC on admission. HH stable. rpt EGD w gastritis and nonbleeding gastric ulcer, guiac neg. capsule to be done on outptn basis
pt had extensive gi work up on last admission and found to have nonbleeding ulcer; this admission pt has no s/s GIB, but has dark stools. GI reconsulted.  Ct A/P c/w retroperitoneal bleed. ASA/Plavix on hold for a week. s/p 2UPRBC. DC home when cleared by GI
pt had extensive gi work up on last admission and found to have nonbleeding ulcer; this admission pt has no s/s GIB, but has dark stools. GI reconsulted.  Ct A/P c/w retroperitoneal bleed. ASA/Plavix on hold for a week. s/p 2UPRBC. DC home when cleared by GI
- Cont diet  - Trend H/H  - Transfuse as necessary  - If Hct continues to drop, Rpt CT angio

## 2017-08-11 NOTE — PROGRESS NOTE ADULT - SUBJECTIVE AND OBJECTIVE BOX
Patient is a 85y old  Female who presents with a chief complaint of dizziness (05 Aug 2017 08:08)      SUBJECTIVE / OVERNIGHT EVENTS: left hand feels much better    MEDICATIONS  (STANDING):  doxazosin 1 milliGRAM(s) Oral at bedtime  isosorbide   mononitrate ER Tablet (IMDUR) 60 milliGRAM(s) Oral daily  gabapentin 300 milliGRAM(s) Oral daily  atorvastatin 80 milliGRAM(s) Oral at bedtime  pantoprazole    Tablet 40 milliGRAM(s) Oral two times a day before meals  losartan 100 milliGRAM(s) Oral daily  furosemide    Tablet 40 milliGRAM(s) Oral daily  amLODIPine   Tablet 10 milliGRAM(s) Oral daily  predniSONE   Tablet 20 milliGRAM(s) Oral daily  aspirin  chewable 81 milliGRAM(s) Oral daily  clopidogrel Tablet 75 milliGRAM(s) Oral daily    MEDICATIONS  (PRN):  acetaminophen   Tablet. 650 milliGRAM(s) Oral every 6 hours PRN Mild Pain (1 - 3)      Vital Signs Last 24 Hrs  T(F): 97.5 (08-11-17 @ 13:16), Max: 99 (08-10-17 @ 17:56)  HR: 86 (08-11-17 @ 13:16) (68 - 86)  BP: 177/69 (08-11-17 @ 13:16) (147/61 - 177/69)  RR: 20 (08-11-17 @ 13:16) (18 - 20)  SpO2: 97% (08-11-17 @ 13:16) (94% - 97%)  Telemetry:   CAPILLARY BLOOD GLUCOSE        I&O's Summary    10 Aug 2017 07:01  -  11 Aug 2017 07:00  --------------------------------------------------------  IN: 1190 mL / OUT: 0 mL / NET: 1190 mL    11 Aug 2017 07:01  -  11 Aug 2017 14:43  --------------------------------------------------------  IN: 360 mL / OUT: 0 mL / NET: 360 mL        PHYSICAL EXAM:  GENERAL: NAD, well-developed  HEAD:  Atraumatic, Normocephalic  EYES: EOMI, PERRLA, conjunctiva and sclera clear  NECK: Supple, No JVD  CHEST/LUNG: Clear to auscultation bilaterally; No wheeze  HEART: Regular rate and rhythm; No murmurs, rubs, or gallops  ABDOMEN: Soft, Nontender, Nondistended; Bowel sounds present  EXTREMITIES:  2+ Peripheral Pulses, No clubbing, cyanosis, or edema  PSYCH: AAOx3  NEUROLOGY: non-focal  SKIN: No rashes or lesions    LABS:                        9.5    4.3   )-----------( 284      ( 11 Aug 2017 07:03 )             32.0                     RADIOLOGY & ADDITIONAL TESTS:    Imaging Personally Reviewed:    Consultant(s) Notes Reviewed:      Care Discussed with Consultants/Other Providers:

## 2017-08-11 NOTE — PROGRESS NOTE ADULT - PROBLEM SELECTOR PROBLEM 4
PVD (peripheral vascular disease)
Dizziness

## 2017-08-11 NOTE — PROGRESS NOTE ADULT - SUBJECTIVE AND OBJECTIVE BOX
GOOD THOMPSON  [id number]    INTERVAL HPI/OVERNIGHT EVENTS:    MEDICATIONS  (STANDING):  doxazosin 1 milliGRAM(s) Oral at bedtime  isosorbide   mononitrate ER Tablet (IMDUR) 60 milliGRAM(s) Oral daily  gabapentin 300 milliGRAM(s) Oral daily  atorvastatin 80 milliGRAM(s) Oral at bedtime  pantoprazole    Tablet 40 milliGRAM(s) Oral two times a day before meals  losartan 100 milliGRAM(s) Oral daily  furosemide    Tablet 40 milliGRAM(s) Oral daily  amLODIPine   Tablet 10 milliGRAM(s) Oral daily  predniSONE   Tablet 20 milliGRAM(s) Oral daily  aspirin  chewable 81 milliGRAM(s) Oral daily  clopidogrel Tablet 75 milliGRAM(s) Oral daily    MEDICATIONS  (PRN):  acetaminophen   Tablet. 650 milliGRAM(s) Oral every 6 hours PRN Mild Pain (1 - 3)      Allergies    No Known Allergies    Intolerances        Review of Systems:   General: No fevers/chills, no fatigue  HEENT: No blurry vision, dysphagia, or odynophagia  CVS: No CP/palpitations  Resp: No SOB/wheezing  GI: No N/V/C/D/abdominal pain  MSK:   Skin: No new rashes  Neuro: No headaches      Vital Signs Last 24 Hrs  T(C): 36.4 (11 Aug 2017 13:16), Max: 37.2 (10 Aug 2017 17:56)  T(F): 97.5 (11 Aug 2017 13:16), Max: 99 (10 Aug 2017 17:56)  HR: 86 (11 Aug 2017 13:16) (68 - 86)  BP: 177/69 (11 Aug 2017 13:16) (147/61 - 177/69)  BP(mean): --  RR: 20 (11 Aug 2017 13:16) (18 - 20)  SpO2: 97% (11 Aug 2017 13:16) (94% - 97%)    Physical Exam:  General: NAD  HEENT: EOMI, MMM  Cardio: +S1/S2, RRR  Resp: CTA b/l  GI: +BS, soft, NT/ND  MSK:  Neuro: AAOx3  Psych: wnl    LABS:                        9.5    4.3   )-----------( 284      ( 11 Aug 2017 07:03 )             32.0                   RADIOLOGY & ADDITIONAL TESTS: GOOD THOMPSON  [id number]    INTERVAL HPI/OVERNIGHT EVENTS:    The patient recieved 2 doses of prednisone since yesterday am and feels significantly better with decreased pain and swelling of the left wrist    MEDICATIONS  (STANDING):  doxazosin 1 milliGRAM(s) Oral at bedtime  isosorbide   mononitrate ER Tablet (IMDUR) 60 milliGRAM(s) Oral daily  gabapentin 300 milliGRAM(s) Oral daily  atorvastatin 80 milliGRAM(s) Oral at bedtime  pantoprazole    Tablet 40 milliGRAM(s) Oral two times a day before meals  losartan 100 milliGRAM(s) Oral daily  furosemide    Tablet 40 milliGRAM(s) Oral daily  amLODIPine   Tablet 10 milliGRAM(s) Oral daily  predniSONE   Tablet 20 milliGRAM(s) Oral daily  aspirin  chewable 81 milliGRAM(s) Oral daily  clopidogrel Tablet 75 milliGRAM(s) Oral daily    MEDICATIONS  (PRN):  acetaminophen   Tablet. 650 milliGRAM(s) Oral every 6 hours PRN Mild Pain (1 - 3)      Allergies : No Known Allergies        Review of Systems:   General: No fevers/chills, no fatigue  HEENT: No blurry vision, dysphagia, or odynophagia  CVS: No CP/palpitations  Resp: No SOB/wheezing  GI: No N/V/C/D/abdominal pain  MSK: left wrist pain and swelling improved  Skin: No new rashes  Neuro: No headaches      Vital Signs Last 24 Hrs  T(C): 36.4 (11 Aug 2017 13:16), Max: 37.2 (10 Aug 2017 17:56)  T(F): 97.5 (11 Aug 2017 13:16), Max: 99 (10 Aug 2017 17:56)  HR: 86 (11 Aug 2017 13:16) (68 - 86)  BP: 177/69 (11 Aug 2017 13:16) (147/61 - 177/69)  BP(mean): --  RR: 20 (11 Aug 2017 13:16) (18 - 20)  SpO2: 97% (11 Aug 2017 13:16) (94% - 97%)    Physical Exam:  General: NAD  Cardio: +S1/S2, RRR  Resp: CTA b/l  MSK: decreased left wrist tenosynovitis, less tender       LABS:                        9.5    4.3   )-----------( 284      ( 11 Aug 2017 07:03 )             32.0                   RADIOLOGY & ADDITIONAL TESTS:

## 2017-08-11 NOTE — PROGRESS NOTE ADULT - PROVIDER SPECIALTY LIST ADULT
Gastroenterology
Internal Medicine
Surgery
Surgery
Rheumatology
Surgery

## 2017-08-11 NOTE — PROGRESS NOTE ADULT - ASSESSMENT
patient with pseudogout , improved  significantly with 2 doses of prednisone    - ok to d/c to rehab  - would  give another dose of 20 mg a day tomorrow and and d/c prednisone after Patient with pseudogout , left wrist. Improved  significantly after 2 doses of prednisone    - ok to d/c to rehab  - would  give another dose of 20 mg a day tomorrow and and d/c prednisone after

## 2017-08-11 NOTE — CHART NOTE - NSCHARTNOTEFT_GEN_A_CORE
Pt seen and examined. Pt endorses left forearm feeling much better, less swollen than yesterday and less painful with movement. Denies any dizziness, SOB, chest discomfort, bleeding. Tolerating po. D/w Rheum Dr. Rolando Rai ([phone]) re: improved Left forearm after prednisone. Recommends slow prednisone taper: 20mg x1 tomorrow 8/12, then 10mg once daily x 3 days, then 5mg once daily x 3 days, then stop. Dr. Rolando Rai will try to see pt today before pt leaves. D/w attending Dr. Cobos regarding above. Discharge pt today to rehab. Resume ASA and Plavix today, prednisone taper as above.       Eneida Craig NP [facility] [id number]

## 2017-08-11 NOTE — PROGRESS NOTE ADULT - PROBLEM SELECTOR PROBLEM 1
Anemia due to other cause, not classified
Acute blood loss anemia
Hematoma

## 2017-09-18 ENCOUNTER — MESSAGE (OUTPATIENT)
Age: 82
End: 2017-09-18

## 2017-10-04 ENCOUNTER — APPOINTMENT (OUTPATIENT)
Dept: GASTROENTEROLOGY | Facility: CLINIC | Age: 82
End: 2017-10-04
Payer: MEDICARE

## 2017-10-04 VITALS
DIASTOLIC BLOOD PRESSURE: 80 MMHG | WEIGHT: 126 LBS | HEART RATE: 43 BPM | BODY MASS INDEX: 22.32 KG/M2 | HEIGHT: 63 IN | SYSTOLIC BLOOD PRESSURE: 130 MMHG | RESPIRATION RATE: 15 BRPM | TEMPERATURE: 97.6 F | OXYGEN SATURATION: 79 %

## 2017-10-04 DIAGNOSIS — K25.9 GASTRIC ULCER, UNSPECIFIED AS ACUTE OR CHRONIC, W/OUT HEMORRHAGE OR PERFORATION: ICD-10-CM

## 2017-10-04 PROCEDURE — 91110 GI TRC IMG INTRAL ESOPH-ILE: CPT

## 2017-10-05 ENCOUNTER — RESULT REVIEW (OUTPATIENT)
Age: 82
End: 2017-10-05

## 2017-10-05 RX ORDER — ATORVASTATIN CALCIUM 80 MG/1
TABLET, FILM COATED ORAL
Refills: 0 | Status: ACTIVE | COMMUNITY

## 2017-10-05 RX ORDER — PANTOPRAZOLE 40 MG/1
40 TABLET, DELAYED RELEASE ORAL
Refills: 0 | Status: ACTIVE | COMMUNITY

## 2017-10-06 ENCOUNTER — LABORATORY RESULT (OUTPATIENT)
Age: 82
End: 2017-10-06

## 2017-10-06 ENCOUNTER — APPOINTMENT (OUTPATIENT)
Dept: GASTROENTEROLOGY | Facility: AMBULATORY MEDICAL SERVICES | Age: 82
End: 2017-10-06

## 2017-10-06 ENCOUNTER — APPOINTMENT (OUTPATIENT)
Dept: GASTROENTEROLOGY | Facility: CLINIC | Age: 82
End: 2017-10-06
Payer: MEDICARE

## 2017-10-06 DIAGNOSIS — D64.9 ANEMIA, UNSPECIFIED: ICD-10-CM

## 2017-10-06 PROCEDURE — 36415 COLL VENOUS BLD VENIPUNCTURE: CPT

## 2017-10-09 ENCOUNTER — RESULT REVIEW (OUTPATIENT)
Age: 82
End: 2017-10-09

## 2017-10-09 ENCOUNTER — MESSAGE (OUTPATIENT)
Age: 82
End: 2017-10-09

## 2017-10-09 LAB
BASOPHILS # BLD AUTO: 0.04 K/UL
BASOPHILS NFR BLD AUTO: 0.9 %
EOSINOPHIL # BLD AUTO: 0.23 K/UL
EOSINOPHIL NFR BLD AUTO: 5.3 %
HCT VFR BLD CALC: 31.5 %
HGB BLD-MCNC: 9.9 G/DL
LYMPHOCYTES # BLD AUTO: 0.86 K/UL
LYMPHOCYTES NFR BLD AUTO: 20.2 %
MAN DIFF?: NORMAL
MCHC RBC-ENTMCNC: 28.1 PG
MCHC RBC-ENTMCNC: 31.4 GM/DL
MCV RBC AUTO: 89.5 FL
MONOCYTES # BLD AUTO: 0.34 K/UL
MONOCYTES NFR BLD AUTO: 7.9 %
NEUTROPHILS # BLD AUTO: 2.8 K/UL
NEUTROPHILS NFR BLD AUTO: 65.7 %
PLATELET # BLD AUTO: 255 K/UL
RBC # BLD: 3.52 M/UL
RBC # FLD: 21 %
WBC # FLD AUTO: 4.26 K/UL

## 2018-12-03 NOTE — H&P ADULT - PROBLEM SELECTOR PLAN 6
Telephone Encounter by Robert Samayoa MD at 09/17/18 04:56 PM     Author:  Robert Samayoa MD Service:  (none) Author Type:  Physician     Filed:  09/17/18 04:56 PM Encounter Date:  9/17/2018 Status:  Signed     :  Robert Samayoa MD (Physician)       Ok       Revision History        Date/Time User Provider Type Action    > 09/17/18 04:56 PM Robert Samayoa MD Physician Sign    Attribution information within the note text is not available.             unclear bone vs liver  ck ggt and repeat alk phos for trend

## 2019-02-13 ENCOUNTER — INPATIENT (INPATIENT)
Facility: HOSPITAL | Age: 84
LOS: 7 days | Discharge: NURSING, REHAB OR LONG-TERM CARE | End: 2019-02-21
Attending: INTERNAL MEDICINE | Admitting: INTERNAL MEDICINE
Payer: MEDICARE

## 2019-02-13 VITALS
SYSTOLIC BLOOD PRESSURE: 127 MMHG | TEMPERATURE: 100 F | DIASTOLIC BLOOD PRESSURE: 87 MMHG | RESPIRATION RATE: 18 BRPM | OXYGEN SATURATION: 99 % | HEART RATE: 76 BPM

## 2019-02-13 DIAGNOSIS — N39.0 URINARY TRACT INFECTION, SITE NOT SPECIFIED: ICD-10-CM

## 2019-02-13 DIAGNOSIS — G93.40 ENCEPHALOPATHY, UNSPECIFIED: ICD-10-CM

## 2019-02-13 DIAGNOSIS — Z98.89 OTHER SPECIFIED POSTPROCEDURAL STATES: Chronic | ICD-10-CM

## 2019-02-13 DIAGNOSIS — I10 ESSENTIAL (PRIMARY) HYPERTENSION: ICD-10-CM
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
## 2019-02-13 LAB
ALBUMIN SERPL ELPH-MCNC: 4 G/DL — SIGNIFICANT CHANGE UP (ref 3.3–5)
ALP SERPL-CCNC: 60 U/L — SIGNIFICANT CHANGE UP (ref 40–120)
ALT FLD-CCNC: 9 U/L — SIGNIFICANT CHANGE UP (ref 4–33)
ANION GAP SERPL CALC-SCNC: 18 MMO/L — HIGH (ref 7–14)
ANISOCYTOSIS BLD QL: SLIGHT — SIGNIFICANT CHANGE UP
APPEARANCE UR: SIGNIFICANT CHANGE UP
AST SERPL-CCNC: 17 U/L — SIGNIFICANT CHANGE UP (ref 4–32)
BACTERIA # UR AUTO: HIGH
BASE EXCESS BLDV CALC-SCNC: -15.3 MMOL/L — SIGNIFICANT CHANGE UP
BASE EXCESS BLDV CALC-SCNC: -4.2 MMOL/L — SIGNIFICANT CHANGE UP
BASOPHILS # BLD AUTO: 0.04 K/UL — SIGNIFICANT CHANGE UP (ref 0–0.2)
BASOPHILS NFR BLD AUTO: 0.6 % — SIGNIFICANT CHANGE UP (ref 0–2)
BASOPHILS NFR SPEC: 0 % — SIGNIFICANT CHANGE UP (ref 0–2)
BILIRUB SERPL-MCNC: 0.3 MG/DL — SIGNIFICANT CHANGE UP (ref 0.2–1.2)
BILIRUB UR-MCNC: NEGATIVE — SIGNIFICANT CHANGE UP
BLASTS # FLD: 0 % — SIGNIFICANT CHANGE UP (ref 0–0)
BLOOD GAS VENOUS - CREATININE: 0.5 MG/DL — SIGNIFICANT CHANGE UP (ref 0.5–1.3)
BLOOD GAS VENOUS - CREATININE: 1.29 MG/DL — SIGNIFICANT CHANGE UP (ref 0.5–1.3)
BLOOD UR QL VISUAL: HIGH
BUN SERPL-MCNC: 34 MG/DL — HIGH (ref 7–23)
CALCIUM SERPL-MCNC: 10.1 MG/DL — SIGNIFICANT CHANGE UP (ref 8.4–10.5)
CHLORIDE BLDV-SCNC: 114 MMOL/L — HIGH (ref 96–108)
CHLORIDE BLDV-SCNC: 130 MMOL/L — HIGH (ref 96–108)
CHLORIDE SERPL-SCNC: 105 MMOL/L — SIGNIFICANT CHANGE UP (ref 98–107)
CO2 SERPL-SCNC: 19 MMOL/L — LOW (ref 22–31)
COLOR SPEC: YELLOW — SIGNIFICANT CHANGE UP
CREAT SERPL-MCNC: 1.43 MG/DL — HIGH (ref 0.5–1.3)
EOSINOPHIL # BLD AUTO: 0 K/UL — SIGNIFICANT CHANGE UP (ref 0–0.5)
EOSINOPHIL NFR BLD AUTO: 0 % — SIGNIFICANT CHANGE UP (ref 0–6)
EOSINOPHIL NFR FLD: 0 % — SIGNIFICANT CHANGE UP (ref 0–6)
EPI CELLS # UR: SIGNIFICANT CHANGE UP
GAS PNL BLDV: 142 MMOL/L — SIGNIFICANT CHANGE UP (ref 136–146)
GAS PNL BLDV: 145 MMOL/L — SIGNIFICANT CHANGE UP (ref 136–146)
GLUCOSE BLDV-MCNC: 137 — HIGH (ref 70–99)
GLUCOSE BLDV-MCNC: 54 — LOW (ref 70–99)
GLUCOSE SERPL-MCNC: 127 MG/DL — HIGH (ref 70–99)
GLUCOSE UR-MCNC: NEGATIVE — SIGNIFICANT CHANGE UP
HCO3 BLDV-SCNC: 13 MMOL/L — LOW (ref 20–27)
HCO3 BLDV-SCNC: 20 MMOL/L — SIGNIFICANT CHANGE UP (ref 20–27)
HCT VFR BLD CALC: 40.4 % — SIGNIFICANT CHANGE UP (ref 34.5–45)
HCT VFR BLDV CALC: 19.5 % — CRITICAL LOW (ref 34.5–45)
HCT VFR BLDV CALC: 41.7 % — SIGNIFICANT CHANGE UP (ref 34.5–45)
HGB BLD-MCNC: 13.1 G/DL — SIGNIFICANT CHANGE UP (ref 11.5–15.5)
HGB BLDV-MCNC: 13.6 G/DL — SIGNIFICANT CHANGE UP (ref 11.5–15.5)
HGB BLDV-MCNC: 6.2 G/DL — CRITICAL LOW (ref 11.5–15.5)
IMM GRANULOCYTES NFR BLD AUTO: 0.3 % — SIGNIFICANT CHANGE UP (ref 0–1.5)
KETONES UR-MCNC: 20 — SIGNIFICANT CHANGE UP
LACTATE BLDV-MCNC: 0.5 MMOL/L — SIGNIFICANT CHANGE UP (ref 0.5–2)
LACTATE BLDV-MCNC: 2.9 MMOL/L — HIGH (ref 0.5–2)
LEUKOCYTE ESTERASE UR-ACNC: HIGH
LYMPHOCYTES # BLD AUTO: 0.34 K/UL — LOW (ref 1–3.3)
LYMPHOCYTES # BLD AUTO: 4.9 % — LOW (ref 13–44)
LYMPHOCYTES NFR SPEC AUTO: 5.2 % — LOW (ref 13–44)
MACROCYTES BLD QL: SLIGHT — SIGNIFICANT CHANGE UP
MCHC RBC-ENTMCNC: 32.4 % — SIGNIFICANT CHANGE UP (ref 32–36)
MCHC RBC-ENTMCNC: 35 PG — HIGH (ref 27–34)
MCV RBC AUTO: 108 FL — HIGH (ref 80–100)
METAMYELOCYTES # FLD: 0 % — SIGNIFICANT CHANGE UP (ref 0–1)
MONOCYTES # BLD AUTO: 0.72 K/UL — SIGNIFICANT CHANGE UP (ref 0–0.9)
MONOCYTES NFR BLD AUTO: 10.4 % — SIGNIFICANT CHANGE UP (ref 2–14)
MONOCYTES NFR BLD: 4.4 % — SIGNIFICANT CHANGE UP (ref 2–9)
MUCOUS THREADS # UR AUTO: SIGNIFICANT CHANGE UP
MYELOCYTES NFR BLD: 0 % — SIGNIFICANT CHANGE UP (ref 0–0)
NEUTROPHIL AB SER-ACNC: 82.6 % — HIGH (ref 43–77)
NEUTROPHILS # BLD AUTO: 5.82 K/UL — SIGNIFICANT CHANGE UP (ref 1.8–7.4)
NEUTROPHILS NFR BLD AUTO: 83.8 % — HIGH (ref 43–77)
NEUTS BAND # BLD: 0 % — SIGNIFICANT CHANGE UP (ref 0–6)
NITRITE UR-MCNC: POSITIVE — SIGNIFICANT CHANGE UP
NRBC # FLD: 0 K/UL — LOW (ref 25–125)
OTHER - HEMATOLOGY %: 0 — SIGNIFICANT CHANGE UP
PCO2 BLDV: 19 MMHG — LOW (ref 41–51)
PCO2 BLDV: 42 MMHG — SIGNIFICANT CHANGE UP (ref 41–51)
PH BLDV: 7.32 PH — SIGNIFICANT CHANGE UP (ref 7.32–7.43)
PH BLDV: 7.32 PH — SIGNIFICANT CHANGE UP (ref 7.32–7.43)
PH UR: 6 — SIGNIFICANT CHANGE UP (ref 5–8)
PLATELET # BLD AUTO: 144 K/UL — LOW (ref 150–400)
PLATELET COUNT - ESTIMATE: NORMAL — SIGNIFICANT CHANGE UP
PMV BLD: 10.7 FL — SIGNIFICANT CHANGE UP (ref 7–13)
PO2 BLDV: 22 MMHG — LOW (ref 35–40)
PO2 BLDV: 62 MMHG — HIGH (ref 35–40)
POTASSIUM BLDV-SCNC: 1.6 MMOL/L — CRITICAL LOW (ref 3.4–4.5)
POTASSIUM BLDV-SCNC: 4.4 MMOL/L — SIGNIFICANT CHANGE UP (ref 3.4–4.5)
POTASSIUM SERPL-MCNC: 4.2 MMOL/L — SIGNIFICANT CHANGE UP (ref 3.5–5.3)
POTASSIUM SERPL-SCNC: 4.2 MMOL/L — SIGNIFICANT CHANGE UP (ref 3.5–5.3)
PROMYELOCYTES # FLD: 0 % — SIGNIFICANT CHANGE UP (ref 0–0)
PROT SERPL-MCNC: 6.9 G/DL — SIGNIFICANT CHANGE UP (ref 6–8.3)
PROT UR-MCNC: 300 — HIGH
RBC # BLD: 3.74 M/UL — LOW (ref 3.8–5.2)
RBC # FLD: 13 % — SIGNIFICANT CHANGE UP (ref 10.3–14.5)
RBC CASTS # UR COMP ASSIST: HIGH (ref 0–?)
SAO2 % BLDV: 36.8 % — LOW (ref 60–85)
SAO2 % BLDV: 90.9 % — HIGH (ref 60–85)
SODIUM SERPL-SCNC: 142 MMOL/L — SIGNIFICANT CHANGE UP (ref 135–145)
SP GR SPEC: 1.01 — SIGNIFICANT CHANGE UP (ref 1–1.04)
TSH SERPL-MCNC: 1.43 UIU/ML — SIGNIFICANT CHANGE UP (ref 0.27–4.2)
UROBILINOGEN FLD QL: NORMAL — SIGNIFICANT CHANGE UP
VARIANT LYMPHS # BLD: 7.8 % — SIGNIFICANT CHANGE UP
WBC # BLD: 6.94 K/UL — SIGNIFICANT CHANGE UP (ref 3.8–10.5)
WBC # FLD AUTO: 6.94 K/UL — SIGNIFICANT CHANGE UP (ref 3.8–10.5)
WBC UR QL: HIGH (ref 0–?)

## 2019-02-13 PROCEDURE — 72170 X-RAY EXAM OF PELVIS: CPT | Mod: 26

## 2019-02-13 PROCEDURE — 70450 CT HEAD/BRAIN W/O DYE: CPT | Mod: 26

## 2019-02-13 PROCEDURE — 71045 X-RAY EXAM CHEST 1 VIEW: CPT | Mod: 26

## 2019-02-13 PROCEDURE — 72125 CT NECK SPINE W/O DYE: CPT | Mod: 26

## 2019-02-13 PROCEDURE — 99223 1ST HOSP IP/OBS HIGH 75: CPT

## 2019-02-13 RX ORDER — SODIUM CHLORIDE 9 MG/ML
1000 INJECTION INTRAMUSCULAR; INTRAVENOUS; SUBCUTANEOUS
Qty: 0 | Refills: 0 | Status: DISCONTINUED | OUTPATIENT
Start: 2019-02-13 | End: 2019-02-15

## 2019-02-13 RX ORDER — CEFTRIAXONE 500 MG/1
1 INJECTION, POWDER, FOR SOLUTION INTRAMUSCULAR; INTRAVENOUS EVERY 24 HOURS
Qty: 0 | Refills: 0 | Status: DISCONTINUED | OUTPATIENT
Start: 2019-02-14 | End: 2019-02-15

## 2019-02-13 RX ORDER — SODIUM CHLORIDE 9 MG/ML
1000 INJECTION INTRAMUSCULAR; INTRAVENOUS; SUBCUTANEOUS ONCE
Qty: 0 | Refills: 0 | Status: COMPLETED | OUTPATIENT
Start: 2019-02-13 | End: 2019-02-13

## 2019-02-13 RX ORDER — CEFTRIAXONE 500 MG/1
1 INJECTION, POWDER, FOR SOLUTION INTRAMUSCULAR; INTRAVENOUS ONCE
Qty: 0 | Refills: 0 | Status: COMPLETED | OUTPATIENT
Start: 2019-02-13 | End: 2019-02-13

## 2019-02-13 RX ORDER — ACETAMINOPHEN 500 MG
650 TABLET ORAL EVERY 6 HOURS
Qty: 0 | Refills: 0 | Status: DISCONTINUED | OUTPATIENT
Start: 2019-02-13 | End: 2019-02-19

## 2019-02-13 RX ORDER — HEPARIN SODIUM 5000 [USP'U]/ML
5000 INJECTION INTRAVENOUS; SUBCUTANEOUS EVERY 8 HOURS
Qty: 0 | Refills: 0 | Status: DISCONTINUED | OUTPATIENT
Start: 2019-02-13 | End: 2019-02-21

## 2019-02-13 RX ADMIN — SODIUM CHLORIDE 1000 MILLILITER(S): 9 INJECTION INTRAMUSCULAR; INTRAVENOUS; SUBCUTANEOUS at 21:29

## 2019-02-13 RX ADMIN — SODIUM CHLORIDE 1000 MILLILITER(S): 9 INJECTION INTRAMUSCULAR; INTRAVENOUS; SUBCUTANEOUS at 19:53

## 2019-02-13 RX ADMIN — CEFTRIAXONE 1 GRAM(S): 500 INJECTION, POWDER, FOR SOLUTION INTRAMUSCULAR; INTRAVENOUS at 21:25

## 2019-02-13 RX ADMIN — CEFTRIAXONE 100 GRAM(S): 500 INJECTION, POWDER, FOR SOLUTION INTRAMUSCULAR; INTRAVENOUS at 20:02

## 2019-02-13 RX ADMIN — SODIUM CHLORIDE 1000 MILLILITER(S): 9 INJECTION INTRAMUSCULAR; INTRAVENOUS; SUBCUTANEOUS at 21:25

## 2019-02-13 NOTE — H&P ADULT - PROBLEM SELECTOR PLAN 3
- In setting of UTI  - Low suspicion for primary CNS process  - Continue antibiotics. Monitor clinically

## 2019-02-13 NOTE — H&P ADULT - HISTORY OF PRESENT ILLNESS
88 yo F with h/o HTN, TIA, prior GI and retroperitoneal bleed brought to ED by her  for weakness and confusion. Pt is poor historian. No family at bedside and unable to contact   at this time. Pt states that she "feels fine". Does endorse feeling weak and remembers falling but no details surrounding the fall. Per ED notes,  stated that the patient fell out of bed, does not suspect any LOC or head trauma. Pt states that she has been having burning sensation while she urinates as well as increased frequency.  Denied fevers, n/v/d, abdominal pain, chest pain, dyspnea, rashes, recent travel, cough or sick contacts.    In ED pt was given ceftriaxone 1g IV and 2L NS   VS:  145/78  82  98.0  16  99% on RA

## 2019-02-13 NOTE — ED ADULT NURSE NOTE - NSIMPLEMENTINTERV_GEN_ALL_ED
Implemented All Fall Risk Interventions:  [place] to call system. Call bell, personal items and telephone within reach. Instruct patient to call for assistance. Room bathroom lighting operational. Non-slip footwear when patient is off stretcher. Physically safe environment: no spills, clutter or unnecessary equipment. Stretcher in lowest position, wheels locked, appropriate side rails in place. Provide visual cue, wrist band, yellow gown, etc. Monitor gait and stability. Monitor for mental status changes and reorient to person, place, and time. Review medications for side effects contributing to fall risk. Reinforce activity limits and safety measures with patient and family.

## 2019-02-13 NOTE — H&P ADULT - ASSESSMENT
88 yo F with h/o HTN, TIA, prior GI and retroperitoneal bleeds brought to ED by  for confusion and weakness. Found to have UTI

## 2019-02-13 NOTE — ED PROVIDER NOTE - ATTENDING CONTRIBUTION TO CARE
Hx of TIA baseline mild confusion, presents after falling out of bed this morning, noted to have altered mental status. 127/87 P 98 RR 18 sat 99 RA HEENT atraumatic, alert speech fluent, lungs clear, able to move hips in painless ROM , UE atraumatic. Imp: altered MS, Hx of TIA/CVA, with fever. Plan CT of head sepsis workup, pelvis xray, check hip right (hx ORIF) admission

## 2019-02-13 NOTE — ED PROVIDER NOTE - CLINICAL SUMMARY MEDICAL DECISION MAKING FREE TEXT BOX
87F HTN TIA anemia p/w urinary complaints, weakness and fall out of bed. Unreliable historian, due to age, A/C use and AMS will CT head, c-spine, CXR, xr pelvis. Rectal fever, will give IVF, ceftriaxone, cultures and lactate.

## 2019-02-13 NOTE — H&P ADULT - NSHPPHYSICALEXAM_GEN_ALL_CORE
T(C): 38.6 (02-13-19 @ 23:41), Max: 38.6 (02-13-19 @ 23:41)  HR: 80 (02-13-19 @ 23:41) (74 - 82)  BP: 180/83 (02-13-19 @ 23:41) (127/87 - 193/66)  RR: 19 (02-13-19 @ 23:41) (16 - 19)  SpO2: 100% (02-13-19 @ 23:41) (98% - 100%)    GENERAL: No acute distress, well-developed  HEAD:  Atraumatic, Normocephalic  ENT: EOMI, PERRLA, conjunctiva and sclera clear, Neck supple, No JVD, moist mucosa, no pharyngeal erythema, no tonsillar enlargement or exudate  CHEST/LUNG: Clear to auscultation bilaterally; No wheeze, equal breath sounds bilaterally   HEART: Regular rate and rhythm; No murmurs, rubs, or gallops  ABDOMEN: Soft, Nontender, Nondistended; Bowel sounds present, no organomegaly  EXTREMITIES:  2+ Peripheral Pulses, No clubbing, cyanosis, or edema  PSYCH: AAOx1 to person only, pleasant mood  NEUROLOGY: Follows commands, moving all extremities 5/5 strength throughout, normal sensation   SKIN: Normal color, No rashes or lesions

## 2019-02-13 NOTE — ED PROVIDER NOTE - PROGRESS NOTE DETAILS
Elizabeth Goldberger PGY-2: pt signed out to me pending CT head and c spine, ua result. CT w/o acute injury, incidentally found thyroid nodule so added on tsh to labs. Lactate elevated after fluids so will give another l. UA confirms uti, likely explanation for findings. Will admit for urosepsis

## 2019-02-13 NOTE — ED PROVIDER NOTE - MUSCULOSKELETAL, MLM
Spine appears normal, range of motion is not limited, no muscle or joint tenderness. No CVAT. No pain with log rolling of legs. No extremity deformities notes.

## 2019-02-13 NOTE — H&P ADULT - NSHPLABSRESULTS_GEN_ALL_CORE
.  LABS:                         13.1   6.94  )-----------( 144      ( 2019 17:30 )             40.4     02-    142  |  105  |  34<H>  ----------------------------<  127<H>  4.2   |  19<L>  |  1.43<H>    Ca    10.1      2019 17:30    TPro  6.9  /  Alb  4.0  /  TBili  0.3  /  DBili  x   /  AST  17  /  ALT  9   /  AlkPhos  60  02-      Urinalysis Basic - ( 2019 19:45 )    Color: YELLOW / Appearance: Lt TURBID / S.013 / pH: 6.0  Gluc: NEGATIVE / Ketone: 20  / Bili: NEGATIVE / Urobili: NORMAL   Blood: MODERATE / Protein: 300 / Nitrite: POSITIVE   Leuk Esterase: LARGE / RBC: 11-25 / WBC 11-25   Sq Epi: x / Non Sq Epi: FEW / Bacteria: MODERATE        RADIOLOGY, EKG & ADDITIONAL TESTS: Reviewed.

## 2019-02-13 NOTE — H&P ADULT - PROBLEM SELECTOR PLAN 2
- In setting of infection and encephalopathy. No LOC or head trauma per . CTH and x-rays neg  - Fall precaution  - PT consult in am

## 2019-02-13 NOTE — ED ADULT NURSE NOTE - OBJECTIVE STATEMENT
Pt AxOx2- disoriented to time and place with c/o AMS Pt AxOx2- disoriented to time and place with c/o confusion, urinary symptoms and fall x1 day. Pt's  at bedside, states that he witnessed wife's fall this AM. He states she did not loose consciousness or hit her head. Pt c/o foul smelling dark colored urine and burning with urination. Pt disoriented but able to follow commands and answer questions. Pt denies N/V, H/A, dizziness, lightheadedness. Skin is intact, normal for race and ethnicity with no pressure ulcers present. Blanchable redness in sacral area present. Motor strength equal in all extremities. IV established by EMS with 20g in RAC, flushes well with good blood return. Vitals noted and stable, MD evaluation completed, will continue to monitor.

## 2019-02-13 NOTE — ED ADULT TRIAGE NOTE - CHIEF COMPLAINT QUOTE
altered mental status and weakness for the past two days. Pt's  called EMS. Pt has a strong urine odor also. Left AC 20 gauge .

## 2019-02-13 NOTE — ED PROVIDER NOTE - OBJECTIVE STATEMENT
86yo female pmh HTN, TIA, anemia, GI bleed, RP bleed p/w weakness and confusion x 3d. Pt fell out of bed this morning, witnessed by  who does not believe she hit her head or lost consciousness. C/o dysuria, foul smelling urine x 3d. Denies fevers, n/v/d, abdominal pain, chest pain, dyspnea, rashes, recent travel, cough or sick contacts.

## 2019-02-14 DIAGNOSIS — Z79.899 OTHER LONG TERM (CURRENT) DRUG THERAPY: ICD-10-CM

## 2019-02-14 DIAGNOSIS — R78.81 BACTEREMIA: ICD-10-CM

## 2019-02-14 LAB
ALBUMIN SERPL ELPH-MCNC: 3.8 G/DL — SIGNIFICANT CHANGE UP (ref 3.3–5)
ALP SERPL-CCNC: 57 U/L — SIGNIFICANT CHANGE UP (ref 40–120)
ALT FLD-CCNC: 6 U/L — SIGNIFICANT CHANGE UP (ref 4–33)
ANION GAP SERPL CALC-SCNC: 15 MMO/L — HIGH (ref 7–14)
AST SERPL-CCNC: 17 U/L — SIGNIFICANT CHANGE UP (ref 4–32)
BASOPHILS # BLD AUTO: 0.02 K/UL — SIGNIFICANT CHANGE UP (ref 0–0.2)
BASOPHILS NFR BLD AUTO: 0.3 % — SIGNIFICANT CHANGE UP (ref 0–2)
BILIRUB SERPL-MCNC: 0.2 MG/DL — SIGNIFICANT CHANGE UP (ref 0.2–1.2)
BUN SERPL-MCNC: 32 MG/DL — HIGH (ref 7–23)
CALCIUM SERPL-MCNC: 10 MG/DL — SIGNIFICANT CHANGE UP (ref 8.4–10.5)
CHLORIDE SERPL-SCNC: 111 MMOL/L — HIGH (ref 98–107)
CO2 SERPL-SCNC: 18 MMOL/L — LOW (ref 22–31)
CREAT SERPL-MCNC: 1.26 MG/DL — SIGNIFICANT CHANGE UP (ref 0.5–1.3)
EOSINOPHIL # BLD AUTO: 0 K/UL — SIGNIFICANT CHANGE UP (ref 0–0.5)
EOSINOPHIL NFR BLD AUTO: 0 % — SIGNIFICANT CHANGE UP (ref 0–6)
GLUCOSE SERPL-MCNC: 122 MG/DL — HIGH (ref 70–99)
HCT VFR BLD CALC: 38.8 % — SIGNIFICANT CHANGE UP (ref 34.5–45)
HGB BLD-MCNC: 12.7 G/DL — SIGNIFICANT CHANGE UP (ref 11.5–15.5)
IMM GRANULOCYTES NFR BLD AUTO: 0.3 % — SIGNIFICANT CHANGE UP (ref 0–1.5)
LYMPHOCYTES # BLD AUTO: 0.44 K/UL — LOW (ref 1–3.3)
LYMPHOCYTES # BLD AUTO: 6.8 % — LOW (ref 13–44)
MAGNESIUM SERPL-MCNC: 2.2 MG/DL — SIGNIFICANT CHANGE UP (ref 1.6–2.6)
MCHC RBC-ENTMCNC: 32.7 % — SIGNIFICANT CHANGE UP (ref 32–36)
MCHC RBC-ENTMCNC: 35.6 PG — HIGH (ref 27–34)
MCV RBC AUTO: 108.7 FL — HIGH (ref 80–100)
METHOD TYPE: SIGNIFICANT CHANGE UP
MONOCYTES # BLD AUTO: 0.89 K/UL — SIGNIFICANT CHANGE UP (ref 0–0.9)
MONOCYTES NFR BLD AUTO: 13.7 % — SIGNIFICANT CHANGE UP (ref 2–14)
NEUTROPHILS # BLD AUTO: 5.12 K/UL — SIGNIFICANT CHANGE UP (ref 1.8–7.4)
NEUTROPHILS NFR BLD AUTO: 78.9 % — HIGH (ref 43–77)
NRBC # FLD: 0 K/UL — LOW (ref 25–125)
ORGANISM # SPEC MICROSCOPIC CNT: SIGNIFICANT CHANGE UP
ORGANISM # SPEC MICROSCOPIC CNT: SIGNIFICANT CHANGE UP
PHOSPHATE SERPL-MCNC: 2.5 MG/DL — SIGNIFICANT CHANGE UP (ref 2.5–4.5)
PLATELET # BLD AUTO: 123 K/UL — LOW (ref 150–400)
PMV BLD: 10.8 FL — SIGNIFICANT CHANGE UP (ref 7–13)
POTASSIUM SERPL-MCNC: 3.9 MMOL/L — SIGNIFICANT CHANGE UP (ref 3.5–5.3)
POTASSIUM SERPL-SCNC: 3.9 MMOL/L — SIGNIFICANT CHANGE UP (ref 3.5–5.3)
PROT SERPL-MCNC: 6.7 G/DL — SIGNIFICANT CHANGE UP (ref 6–8.3)
RBC # BLD: 3.57 M/UL — LOW (ref 3.8–5.2)
RBC # FLD: 12.9 % — SIGNIFICANT CHANGE UP (ref 10.3–14.5)
SODIUM SERPL-SCNC: 144 MMOL/L — SIGNIFICANT CHANGE UP (ref 135–145)
SPECIMEN SOURCE: SIGNIFICANT CHANGE UP
WBC # BLD: 6.49 K/UL — SIGNIFICANT CHANGE UP (ref 3.8–10.5)
WBC # FLD AUTO: 6.49 K/UL — SIGNIFICANT CHANGE UP (ref 3.8–10.5)

## 2019-02-14 RX ORDER — ISOSORBIDE MONONITRATE 60 MG/1
60 TABLET, EXTENDED RELEASE ORAL DAILY
Qty: 0 | Refills: 0 | Status: DISCONTINUED | OUTPATIENT
Start: 2019-02-14 | End: 2019-02-21

## 2019-02-14 RX ORDER — AMLODIPINE BESYLATE 2.5 MG/1
10 TABLET ORAL ONCE
Qty: 0 | Refills: 0 | Status: COMPLETED | OUTPATIENT
Start: 2019-02-14 | End: 2019-02-14

## 2019-02-14 RX ORDER — GABAPENTIN 400 MG/1
1 CAPSULE ORAL
Qty: 0 | Refills: 0 | COMMUNITY

## 2019-02-14 RX ORDER — GABAPENTIN 400 MG/1
300 CAPSULE ORAL THREE TIMES A DAY
Qty: 0 | Refills: 0 | Status: DISCONTINUED | OUTPATIENT
Start: 2019-02-14 | End: 2019-02-14

## 2019-02-14 RX ORDER — DOXAZOSIN MESYLATE 4 MG
1 TABLET ORAL AT BEDTIME
Qty: 0 | Refills: 0 | Status: DISCONTINUED | OUTPATIENT
Start: 2019-02-14 | End: 2019-02-21

## 2019-02-14 RX ORDER — LOSARTAN POTASSIUM 100 MG/1
50 TABLET, FILM COATED ORAL DAILY
Qty: 0 | Refills: 0 | Status: DISCONTINUED | OUTPATIENT
Start: 2019-02-14 | End: 2019-02-17

## 2019-02-14 RX ORDER — CLOPIDOGREL BISULFATE 75 MG/1
75 TABLET, FILM COATED ORAL DAILY
Qty: 0 | Refills: 0 | Status: DISCONTINUED | OUTPATIENT
Start: 2019-02-14 | End: 2019-02-21

## 2019-02-14 RX ORDER — ATORVASTATIN CALCIUM 80 MG/1
1 TABLET, FILM COATED ORAL
Qty: 0 | Refills: 0 | COMMUNITY

## 2019-02-14 RX ORDER — ASPIRIN/CALCIUM CARB/MAGNESIUM 324 MG
1 TABLET ORAL
Qty: 0 | Refills: 0 | COMMUNITY

## 2019-02-14 RX ORDER — GABAPENTIN 400 MG/1
600 CAPSULE ORAL DAILY
Qty: 0 | Refills: 0 | Status: DISCONTINUED | OUTPATIENT
Start: 2019-02-14 | End: 2019-02-18

## 2019-02-14 RX ORDER — CLOPIDOGREL BISULFATE 75 MG/1
1 TABLET, FILM COATED ORAL
Qty: 0 | Refills: 0 | COMMUNITY

## 2019-02-14 RX ADMIN — LOSARTAN POTASSIUM 50 MILLIGRAM(S): 100 TABLET, FILM COATED ORAL at 13:45

## 2019-02-14 RX ADMIN — HEPARIN SODIUM 5000 UNIT(S): 5000 INJECTION INTRAVENOUS; SUBCUTANEOUS at 05:15

## 2019-02-14 RX ADMIN — CLOPIDOGREL BISULFATE 75 MILLIGRAM(S): 75 TABLET, FILM COATED ORAL at 18:15

## 2019-02-14 RX ADMIN — AMLODIPINE BESYLATE 10 MILLIGRAM(S): 2.5 TABLET ORAL at 06:13

## 2019-02-14 RX ADMIN — ISOSORBIDE MONONITRATE 60 MILLIGRAM(S): 60 TABLET, EXTENDED RELEASE ORAL at 13:45

## 2019-02-14 RX ADMIN — SODIUM CHLORIDE 75 MILLILITER(S): 9 INJECTION INTRAMUSCULAR; INTRAVENOUS; SUBCUTANEOUS at 11:39

## 2019-02-14 RX ADMIN — Medication 1 MILLIGRAM(S): at 22:41

## 2019-02-14 RX ADMIN — HEPARIN SODIUM 5000 UNIT(S): 5000 INJECTION INTRAVENOUS; SUBCUTANEOUS at 22:41

## 2019-02-14 RX ADMIN — GABAPENTIN 600 MILLIGRAM(S): 400 CAPSULE ORAL at 18:15

## 2019-02-14 RX ADMIN — CEFTRIAXONE 100 GRAM(S): 500 INJECTION, POWDER, FOR SOLUTION INTRAMUSCULAR; INTRAVENOUS at 19:15

## 2019-02-14 RX ADMIN — HEPARIN SODIUM 5000 UNIT(S): 5000 INJECTION INTRAVENOUS; SUBCUTANEOUS at 13:45

## 2019-02-14 NOTE — PROGRESS NOTE ADULT - SUBJECTIVE AND OBJECTIVE BOX
She is pleasantly confused  Denies any fevers/chills/N/V/CP/SOB/abd pain  No dysuria or flank pain    Vital Signs Last 24 Hrs  T(C): 37.1 (2019 05:12), Max: 38.6 (2019 23:41)  T(F): 98.7 (2019 05:12), Max: 101.4 (2019 23:41)  HR: 85 (2019 05:12) (74 - 85)  BP: 170/90 (2019 07:34) (127/87 - 200/100)  BP(mean): --  RR: 18 (2019 05:12) (16 - 19)  SpO2: 96% (2019 05:12) (96% - 100%)    GENERAL: No acute distress, well-developed  HEAD:  Atraumatic, Normocephalic  ENT: EOMI, PERRLA, conjunctiva and sclera clear, Neck supple, No JVD, moist mucosa, no pharyngeal erythema, no tonsillar enlargement or exudate  CHEST/LUNG: Clear to auscultation bilaterally; No wheeze, equal breath sounds bilaterally   HEART: Regular rate and rhythm; No murmurs, rubs, or gallops  ABDOMEN: Soft, Nontender, Nondistended; Bowel sounds present, no organomegaly  EXTREMITIES:  2+ Peripheral Pulses, No clubbing, cyanosis, or edema  PSYCH: AAOx1 to person only, pleasant mood  NEUROLOGY: Follows commands, moving all extremities 5/5 strength throughout, normal sensation   SKIN: Normal color, No rashes or lesions    LABS:                        12.7   6.49  )-----------( 123      ( 2019 05:30 )             38.8     02-14    144  |  111<H>  |  32<H>  ----------------------------<  122<H>  3.9   |  18<L>  |  1.26    Ca    10.0      2019 05:30  Phos  2.5     -  Mg     2.2     -14    TPro  6.7  /  Alb  3.8  /  TBili  0.2  /  DBili  x   /  AST  17  /  ALT  6   /  AlkPhos  57  -14      CAPILLARY BLOOD GLUCOSE            Urinalysis Basic - ( 2019 19:45 )    Color: YELLOW / Appearance: Lt TURBID / S.013 / pH: 6.0  Gluc: NEGATIVE / Ketone: 20  / Bili: NEGATIVE / Urobili: NORMAL   Blood: MODERATE / Protein: 300 / Nitrite: POSITIVE   Leuk Esterase: LARGE / RBC: 11-25 / WBC 11-25   Sq Epi: x / Non Sq Epi: FEW / Bacteria: MODERATE

## 2019-02-14 NOTE — PROVIDER CONTACT NOTE (OTHER) - ASSESSMENT
A&Ox1, VSS w/ elevated BP (MD aware), afebrile. Skin CDI. Voids freely to bedside commode. OOB x 2 assist. Tolerating solids. Bed alarm on.

## 2019-02-14 NOTE — PROGRESS NOTE ADULT - PROBLEM SELECTOR PLAN 1
- Pt also febrile   - Continue ceftriaxone  - E coli detected in blood PCR  - F/u repeat blood cx  - appreciate ID

## 2019-02-14 NOTE — PROVIDER CONTACT NOTE (OTHER) - SITUATION
Call received from lab w/ results from pt. from 2/13 w/ 2 positive blood cultures, both with gram negative rods.

## 2019-02-14 NOTE — CONSULT NOTE ADULT - PROBLEM SELECTOR RECOMMENDATION 9
ecoli ? gu source  urine cx still pending  nontoxic and doing well on ceftriaxone  continue pending sensitivities  if source remains unclear, would scan abd and pelvis  follow temp  surveillance cx ordered

## 2019-02-14 NOTE — CONSULT NOTE ADULT - ASSESSMENT
87f with htn, tia, gerd, admitted with encephalopathy, fall  found to have fever and pyuria  Ecoli bacteremia

## 2019-02-14 NOTE — PATIENT PROFILE ADULT - FUNCTIONAL SCREEN CURRENT LEVEL: COMMUNICATION, MLM
0 = understands/communicates without difficulty 2 = difficulty understanding (not related to language barrier)

## 2019-02-14 NOTE — CONSULT NOTE ADULT - SUBJECTIVE AND OBJECTIVE BOX
[facility], Division of Infectious Diseases  FAN Esteves A. Lee  [phone]    GOOD THOMPSON  87y, Female  [id number]      HPI:pt is oriented times 3, but confused as to why she is here.  history per pt and chart.  86 yo F with h/o HTN, TIA, prior GI and retroperitoneal bleed brought to ED by her  for weakness and confusion.  Pt states that she "feels fine". Does endorse feeling weak and remembers falling and states he back was sore.  Per ED notes,  stated that the patient fell out of bed, does not suspect any LOC or head trauma.  Denied fevers, n/v/d, abdominal pain, chest pain, dyspnea, rashes, recent travel, cough or sick contacts.     PMH/PSH--  Gastric ulcer, unspecified as acute or chronic, without hemorrhage or perforation  Acute stomach ulcer  PVD (peripheral vascular disease)  GIB (gastrointestinal bleeding)  HTN (hypertension)  TIA (transient ischemic attack)  History of operative procedure on hip  Varicose veins  S/P tonsillectomy      Allergies--NKDA      Medications--  Antibiotics: cefTRIAXone   IVPB 1 Gram(s) IV Intermittent every 24 hours    Immunologic:   Other: acetaminophen   Tablet .. PRN  heparin  Injectable  sodium chloride 0.9%.      Social History--  EtOH: denies ***  Tobacco: former  Drug Use: denies ***    Family/Marital History--   lives with   nc    Remainder not relevant to clinical concern.    Travel/Environmental/Occupational History:  retired worked in a law office    Review of Systems:  A >=10-point review of systems was obtained.   difficult to obtain as pt does not remember any symptoms    Review of systems otherwise negative except as previously noted.    Physical Exam--  Vital Signs: T(F): 98.7 (02-14-19 @ 05:12), Max: 101.4 (02-13-19 @ 23:41)  HR: 85 (02-14-19 @ 05:12)  BP: 170/90 (02-14-19 @ 07:34)  RR: 18 (02-14-19 @ 05:12)  SpO2: 96% (02-14-19 @ 05:12)  Wt(kg): --  General: Nontoxic-appearing Female in no acute distress.  HEENT: AT/NC. P Anicteric. Conjunctiva pink and moist. Oropharynx clear. Dentition fair.  Neck: Not rigid. No sense of mass.  Nodes: None palpable.  Lungs: Clear bilaterally without rales, wheezing or rhonchi  Heart: Regular rate and rhythm. No Murmur. No rub. No gallop. No palpable thrill.  Abdomen: Bowel sounds present and normoactive. Soft. Nondistended. Nontender.   Back: No spinal tenderness. No costovertebral angle tenderness.   Extremities: No cyanosis or clubbing. No edema.   Skin: Warm. Dry. Good turgor. No rash. No vasculitic stigmata.  Psychiatric: mild confusion         Laboratory & Imaging Data--  CBC                        12.7   6.49  )-----------( 123      ( 14 Feb 2019 05:30 )             38.8       Chemistries  02-14    144  |  111<H>  |  32<H>  ----------------------------<  122<H>  3.9   |  18<L>  |  1.26    Ca    10.0      14 Feb 2019 05:30  Phos  2.5     02-14  Mg     2.2     02-14    TPro  6.7  /  Alb  3.8  /  TBili  0.2  /  DBili  x   /  AST  17  /  ALT  6   /  AlkPhos  57  02-14      Culture Data    Culture - Urine (collected 13 Feb 2019 20:43)  Source: URINE MIDSTREAM  Preliminary Report (14 Feb 2019 08:05):    Insufficient growth, culture re-incubated.    Culture - Blood (collected 13 Feb 2019 18:24)  Source: BLOOD PERIPHERAL  Preliminary Report (14 Feb 2019 08:00):    ***Blood Panel PCR results on this specimen are available    approximately 3 hours after the Gram stain result***    Gram stain, PCR, and/or culture results may not always    correspond due to difference in methodologies    ------------------------------------------------------------    This PCR assay was performed using [employer].  The    following targets are tested for:  Enterococcus, vancomycin    resistant enterococci, Listeria monocytogenes,  coagulase    negative staphylococci, S. aureus, methicillin resistant S.    aureus, Streptococcus agalactiae (Group B), S. pneumoniae,    S. pyogenes (Group A), Acinetobacter baumannii, Enterobacter    cloacae, E. coli, Klebsiella oxytoca, K. pneumoniae, Proteus    sp., Serratia marcescens, Haemophilus influenzae, Neisseria    meningitidis, Pseudomonas aeruginosa, Candida albicans, C.    glabrata, C. krusei, C. parapsilosis, C. tropicalis and the    KPC resistance gene.    **NOTE: Due to technical problems, Proteus sp. will NOT be    reported as part of the BCID paneluntil further notice.  Organism: BLOOD CULTURE PCR (14 Feb 2019 08:00)  Organism: BLOOD CULTURE PCR (14 Feb 2019 08:00)    Culture - Blood (collected 13 Feb 2019 18:13)  Source: BLOOD VENOUS      Culture - Urine (02.13.19 @ 20:43)    Culture - Urine:   Insufficient growth, culture re-incubated.    Specimen Source: URINE MIDSTREAM    Urinalysis (02.13.19 @ 19:45)    Color: YELLOW    Urine Appearance: Lt TURBID    Glucose: NEGATIVE    Bilirubin: NEGATIVE    Ketone - Urine: 20    Specific Gravity: 1.013    Blood: MODERATE    pH - Urine: 6.0    Protein, Urine: 300    Urobilinogen: NORMAL    Nitrite: POSITIVE    Leukocyte Esterase Concentration: LARGE    Red Blood Cell - Urine: 11-25    White Blood Cell - Urine: 11-25    Epithelial Cells: FEW    Mucus: FEW    Bacteria: MODERATE        < from: CT Cervical Spine No Cont (02.13.19 @ 18:44) >    EXAM:  CT CERVICAL SPINE      EXAM:  CT BRAIN        PROCEDURE DATE:  Feb 13 2019         INTERPRETATION:  CLINICAL INFORMATION: Altered mental status after a   fall. Weakness. Cervical spine trauma, nexus positive.    TECHNIQUE: Noncontrast CT scan of the head and cervical spine was   performed on 2/13/2019. The head CT portion was performed with 5 mm axial   images. The cervical spine CT was performed with thin section axial   images with sagittal and coronal reformations.    COMPARISON: CT head 8/3/2017.    FINDINGS:    HEAD:    No CT evidence of acute intracranial hemorrhage, mass effect, or midline   shift. The basal cisterns are patent without evidence of central   herniation. No hydrocephalus.    The previously noted chronic lacunar infarcts are not as well-visualized   on the current study.    There is moderate cerebral volume loss with proportional prominence of   the sulci and ventricles.. There is mild to moderate periventricular   patchy white matter hypoattenuation which isnonspecific in etiology but   likely related to chronic microvascular ischemic disease.    The visualized paranasal sinuses are clear. The mastoid air cells and   middle ear cavities are clear. The soft tissues of the scalp are   unremarkable. The calvarium is intact.    CERVICAL SPINE:    The study is mildly degraded by motion artifact. There is straightening   of the cervical lordosis. There is mild anterolisthesis of C4 and C5 and   mild to moderate anterolisthesis of C7 on T1. The vertebral body heights   are maintained and there is no evidence of acute fracture. There is   multilevel uncovertebral and facet joint arthrosis, which results in   moderate foraminal narrowing of the left foramen at C3/4, the right   foramen at C4/C5, and the bilateral foramina at C6/C7. Disc bulges, facet   degenerative changes, and ligamentum flavum hypertrophy result in   multilevel spinal canal stenosis and neural foraminal narrowing, the   overall degree of which is not well-demonstrated on this study.     A left thyroid goiter versus dominant thyroid nodule is noted extending   into the superior mediastinum, partially visualized. Consider correlation   with dedicated thyroid ultrasound if clinically warranted.    IMPRESSION:    CT Head: No evidence for calvarial fracture, acute intracranial   hemorrhage, or acute infarct.    CT Cervical spine: No acute fracture. Cervical degenerative changes, as   above.    A left thyroid goiter versus dominant thyroid nodule is noted extending   into the superior mediastinum, partially visualized. Consider correlation   with dedicated thyroid ultrasound if clinically warranted.            < end of copied text >      < from: Xray Pelvis AP only (02.13.19 @ 17:46) >    EXAM:  RAD PELVIS AP ONLY        PROCEDURE DATE:  Feb 13 2019         INTERPRETATION:  CLINICAL INDICATION: Altered mental status, fall    TECHNIQUE: Frontal radiograph the pelvis.    COMPARISON: Appearance is made to CT of the pelvis 3/5/2016 and    IMPRESSION:    The patient is status post right hip gamma nail and lateral plate   placement. No periprosthetic lucency is seen. No acute fracture   dislocation.     The bony pelvis is intact. There is mild degenerative changes of the   visualized lumbar spine. Remaining joint spaces are preserved. The   patient is status post kissing iliac stents. No soft tissue swelling.   Evaluation of the sacrum and coccyx is limited secondary to overlying   bowel.        < end of copied text >

## 2019-02-14 NOTE — PROVIDER CONTACT NOTE (OTHER) - ASSESSMENT
Pt endorses mild discomfort with palpation of abdomen. Bladder scan performed and >200 ml recorded.  Straight Cath performed as per ADS NP and output of 500C recorded.

## 2019-02-14 NOTE — CHART NOTE - NSCHARTNOTEFT_GEN_A_CORE
Notified by ABELARDO Ding that pt has not voided since morning, instructed to straight cath patient. 500cc was reported from RN. will continue to monitor urine output and follow straight cath and scanlon protocol if needed

## 2019-02-14 NOTE — CHART NOTE - NSCHARTNOTEFT_GEN_A_CORE
Micro called to notify that BCx growing GNR- pt admitted for urinary tract infection, currently on CTX. Notified Attending who did H&P Dr. Roberts, c/w CTX and f/u on final BCx result and sensitivities. Will continue to monitor patient.

## 2019-02-15 DIAGNOSIS — Z29.9 ENCOUNTER FOR PROPHYLACTIC MEASURES, UNSPECIFIED: ICD-10-CM

## 2019-02-15 DIAGNOSIS — A41.9 SEPSIS, UNSPECIFIED ORGANISM: ICD-10-CM

## 2019-02-15 DIAGNOSIS — N17.9 ACUTE KIDNEY FAILURE, UNSPECIFIED: ICD-10-CM

## 2019-02-15 LAB
ANION GAP SERPL CALC-SCNC: 16 MMO/L — HIGH (ref 7–14)
BUN SERPL-MCNC: 29 MG/DL — HIGH (ref 7–23)
CALCIUM SERPL-MCNC: 10 MG/DL — SIGNIFICANT CHANGE UP (ref 8.4–10.5)
CHLORIDE SERPL-SCNC: 111 MMOL/L — HIGH (ref 98–107)
CO2 SERPL-SCNC: 19 MMOL/L — LOW (ref 22–31)
CREAT SERPL-MCNC: 1.13 MG/DL — SIGNIFICANT CHANGE UP (ref 0.5–1.3)
GLUCOSE SERPL-MCNC: 122 MG/DL — HIGH (ref 70–99)
HCT VFR BLD CALC: 40 % — SIGNIFICANT CHANGE UP (ref 34.5–45)
HGB BLD-MCNC: 13.3 G/DL — SIGNIFICANT CHANGE UP (ref 11.5–15.5)
MCHC RBC-ENTMCNC: 33.3 % — SIGNIFICANT CHANGE UP (ref 32–36)
MCHC RBC-ENTMCNC: 35.7 PG — HIGH (ref 27–34)
MCV RBC AUTO: 107.2 FL — HIGH (ref 80–100)
NRBC # FLD: 0 K/UL — LOW (ref 25–125)
PLATELET # BLD AUTO: 120 K/UL — LOW (ref 150–400)
PMV BLD: 11.2 FL — SIGNIFICANT CHANGE UP (ref 7–13)
POTASSIUM SERPL-MCNC: 4 MMOL/L — SIGNIFICANT CHANGE UP (ref 3.5–5.3)
POTASSIUM SERPL-SCNC: 4 MMOL/L — SIGNIFICANT CHANGE UP (ref 3.5–5.3)
RBC # BLD: 3.73 M/UL — LOW (ref 3.8–5.2)
RBC # FLD: 13 % — SIGNIFICANT CHANGE UP (ref 10.3–14.5)
SODIUM SERPL-SCNC: 146 MMOL/L — HIGH (ref 135–145)
SPECIMEN SOURCE: SIGNIFICANT CHANGE UP
SPECIMEN SOURCE: SIGNIFICANT CHANGE UP
WBC # BLD: 6.54 K/UL — SIGNIFICANT CHANGE UP (ref 3.8–10.5)
WBC # FLD AUTO: 6.54 K/UL — SIGNIFICANT CHANGE UP (ref 3.8–10.5)

## 2019-02-15 PROCEDURE — 76770 US EXAM ABDO BACK WALL COMP: CPT | Mod: 26

## 2019-02-15 RX ORDER — PIPERACILLIN AND TAZOBACTAM 4; .5 G/20ML; G/20ML
3.38 INJECTION, POWDER, LYOPHILIZED, FOR SOLUTION INTRAVENOUS ONCE
Qty: 0 | Refills: 0 | Status: COMPLETED | OUTPATIENT
Start: 2019-02-15 | End: 2019-02-15

## 2019-02-15 RX ORDER — SODIUM CHLORIDE 9 MG/ML
1000 INJECTION INTRAMUSCULAR; INTRAVENOUS; SUBCUTANEOUS
Qty: 0 | Refills: 0 | Status: DISCONTINUED | OUTPATIENT
Start: 2019-02-15 | End: 2019-02-20

## 2019-02-15 RX ORDER — PIPERACILLIN AND TAZOBACTAM 4; .5 G/20ML; G/20ML
3.38 INJECTION, POWDER, LYOPHILIZED, FOR SOLUTION INTRAVENOUS EVERY 8 HOURS
Qty: 0 | Refills: 0 | Status: DISCONTINUED | OUTPATIENT
Start: 2019-02-15 | End: 2019-02-17

## 2019-02-15 RX ADMIN — GABAPENTIN 600 MILLIGRAM(S): 400 CAPSULE ORAL at 12:55

## 2019-02-15 RX ADMIN — HEPARIN SODIUM 5000 UNIT(S): 5000 INJECTION INTRAVENOUS; SUBCUTANEOUS at 07:14

## 2019-02-15 RX ADMIN — SODIUM CHLORIDE 75 MILLILITER(S): 9 INJECTION INTRAMUSCULAR; INTRAVENOUS; SUBCUTANEOUS at 01:32

## 2019-02-15 RX ADMIN — Medication 1 MILLIGRAM(S): at 23:44

## 2019-02-15 RX ADMIN — PIPERACILLIN AND TAZOBACTAM 25 GRAM(S): 4; .5 INJECTION, POWDER, LYOPHILIZED, FOR SOLUTION INTRAVENOUS at 13:01

## 2019-02-15 RX ADMIN — LOSARTAN POTASSIUM 50 MILLIGRAM(S): 100 TABLET, FILM COATED ORAL at 07:14

## 2019-02-15 RX ADMIN — PIPERACILLIN AND TAZOBACTAM 200 GRAM(S): 4; .5 INJECTION, POWDER, LYOPHILIZED, FOR SOLUTION INTRAVENOUS at 11:00

## 2019-02-15 RX ADMIN — HEPARIN SODIUM 5000 UNIT(S): 5000 INJECTION INTRAVENOUS; SUBCUTANEOUS at 13:01

## 2019-02-15 RX ADMIN — HEPARIN SODIUM 5000 UNIT(S): 5000 INJECTION INTRAVENOUS; SUBCUTANEOUS at 22:35

## 2019-02-15 RX ADMIN — PIPERACILLIN AND TAZOBACTAM 25 GRAM(S): 4; .5 INJECTION, POWDER, LYOPHILIZED, FOR SOLUTION INTRAVENOUS at 22:34

## 2019-02-15 RX ADMIN — CLOPIDOGREL BISULFATE 75 MILLIGRAM(S): 75 TABLET, FILM COATED ORAL at 12:55

## 2019-02-15 RX ADMIN — ISOSORBIDE MONONITRATE 60 MILLIGRAM(S): 60 TABLET, EXTENDED RELEASE ORAL at 12:58

## 2019-02-15 NOTE — PROVIDER CONTACT NOTE (CRITICAL VALUE NOTIFICATION) - SITUATION
Patient has positive blood culture from 2/14 after 16 hour intubation in anaerobic bottle. Gram negative rods found

## 2019-02-15 NOTE — PROVIDER CONTACT NOTE (OTHER) - ASSESSMENT
Patient asymptomatic at this time, denies chest pain, no s/s noted, denies SOB, N/V/dizziness, or pain. No acute distress noted

## 2019-02-15 NOTE — PROGRESS NOTE ADULT - SUBJECTIVE AND OBJECTIVE BOX
infectious diseases progress note:    GOOD THOMPSON is a 87y y. o. Female patient    Patient reports: no new issues    ROS:    EYES:  Negative  blurry vision or double vision  GASTROINTESTINAL:  Negative for nausea, vomiting, diarrhea  -otherwise negative except for subjective    Allergies    No Known Allergies    Intolerances        ANTIBIOTICS/RELEVANT:  antimicrobials  cefTRIAXone   IVPB 1 Gram(s) IV Intermittent every 24 hours    immunologic:    OTHER:  acetaminophen   Tablet .. 650 milliGRAM(s) Oral every 6 hours PRN  clopidogrel Tablet 75 milliGRAM(s) Oral daily  doxazosin 1 milliGRAM(s) Oral at bedtime  gabapentin 600 milliGRAM(s) Oral daily  heparin  Injectable 5000 Unit(s) SubCutaneous every 8 hours  isosorbide   mononitrate ER Tablet (IMDUR) 60 milliGRAM(s) Oral daily  losartan 50 milliGRAM(s) Oral daily  sodium chloride 0.9%. 1000 milliLiter(s) IV Continuous <Continuous>      Objective:  Last 24-Vital Signs Last 24 Hrs  T(C): 36.8 (15 Feb 2019 09:03), Max: 36.9 (15 Feb 2019 07:05)  T(F): 98.3 (15 Feb 2019 09:03), Max: 98.4 (15 Feb 2019 07:05)  HR: 98 (15 Feb 2019 09:03) (82 - 98)  BP: 129/64 (15 Feb 2019 09:03) (129/64 - 200/70)  BP(mean): --  RR: 18 (15 Feb 2019 09:03) (16 - 18)  SpO2: 98% (15 Feb 2019 09:03) (97% - 100%)    T(C): 36.8 (02-15-19 @ 09:03), Max: 38.6 (19 @ 23:41)  T(F): 98.3 (02-15-19 @ 09:03), Max: 101.4 (19 @ 23:41)  T(C): 36.8 (02-15-19 @ 09:03), Max: 38.6 (19 @ 23:41)  T(F): 98.3 (02-15-19 @ 09:03), Max: 101.4 (19 @ 23:41)  T(C): 36.8 (02-15-19 @ 09:03), Max: 38.6 (19 @ 23:41)  T(F): 98.3 (02-15-19 @ 09:03), Max: 101.4 (19 @ 23:41)    PHYSICAL EXAM:  Constitutional: Well-developed, well nourished  Eyes: PERRLA, EOMI  Ear/Nose/Throat: oropharynx normal	  Neck: no JVD, no lymphadenopathy, supple  Respiratory: no accessory muscle use, lung fields bilaterally clear  Cardiovascular: RRR, normal S1, S2 no m/r/g  Gastrointestinal: soft, mild supra pubic pain, no HSM, BS-normal  Extremities: no clubbing, no cyanosis, edema absent  Neuro: patient alert, oriented and appropriate  Skin: no sig lesions      LABS:                        13.3   6.54  )-----------( 120      ( 15 Feb 2019 05:48 )             40.0       WBC 6.54  02-15 @ 05:48  WBC 6.49   @ 05:30  WBC 6.94   @ 17:30      02-15    146<H>  |  111<H>  |  29<H>  ----------------------------<  122<H>  4.0   |  19<L>  |  1.13    Ca    10.0      15 Feb 2019 05:48  Phos  2.5       Mg     2.2         TPro  6.7  /  Alb  3.8  /  TBili  0.2  /  DBili  x   /  AST  17  /  ALT  6   /  AlkPhos  57        Creatinine, Serum: 1.13 mg/dL (02-15-19 @ 05:48)  Creatinine, Serum: 1.26 mg/dL (19 @ 05:30)  Creatinine, Serum: 1.43 mg/dL (19 @ 17:30)        Urinalysis Basic - ( 2019 19:45 )    Color: YELLOW / Appearance: Lt TURBID / S.013 / pH: 6.0  Gluc: NEGATIVE / Ketone: 20  / Bili: NEGATIVE / Urobili: NORMAL   Blood: MODERATE / Protein: 300 / Nitrite: POSITIVE   Leuk Esterase: LARGE / RBC: 11-25 / WBC 11-25   Sq Epi: x / Non Sq Epi: FEW / Bacteria: MODERATE      MICROBIOLOGY:      Culture - Blood (collected 2019 11:59)  Source: BLOOD PERIPHERAL    Culture - Urine (collected 2019 20:43)  Source: URINE MIDSTREAM  Preliminary Report (15 Feb 2019 09:58):    EC^Escherichia coli    COLONY COUNT: > = 100,000 CFU/ML  Preliminary Report (2019 08:05):    Insufficient growth, culture re-incubated.    Culture - Blood (collected 2019 18:24)  Source: BLOOD PERIPHERAL  Preliminary Report (2019 08:00):    ***Blood Panel PCR results on this specimen are available    approximately 3 hours after the Gram stain result***    Gram stain, PCR, and/or culture results may not always    correspond due to difference in methodologies    ------------------------------------------------------------    This PCR assay was performed using [employer].  The    following targets are tested for:  Enterococcus, vancomycin    resistant enterococci, Listeria monocytogenes,  coagulase    negative staphylococci, S. aureus, methicillin resistant S.    aureus, Streptococcus agalactiae (Group B), S. pneumoniae,    S. pyogenes (Group A), Acinetobacter baumannii, Enterobacter    cloacae, E. coli, Klebsiella oxytoca, K. pneumoniae, Proteus    sp., Serratia marcescens, Haemophilus influenzae, Neisseria    meningitidis, Pseudomonas aeruginosa, Candida albicans, C.    glabrata, C. krusei, C. parapsilosis, C. tropicalis and the    KPC resistance gene.    **NOTE: Due to technical problems, Proteus sp. will NOT be    reported as part of the BCID paneluntil further notice.  Organism: BLOOD CULTURE PCR (2019 08:00)  Organism: BLOOD CULTURE PCR (2019 08:00)    Culture - Blood (collected 2019 18:13)  Source: BLOOD VENOUS  Preliminary Report (15 Feb 2019 09:24):    EC^Escherichia coli        RADIOLOGY & ADDITIONAL STUDIES:

## 2019-02-15 NOTE — PROVIDER CONTACT NOTE (OTHER) - ASSESSMENT
Patient alert and oriented, confused at times, asymptomatic, lying comfortably in bed. Denies chest pain, SOB, N/V/dizziness, no acute distress noted, no s/s noted. BP medications given as ordered for AM. continue to monitor

## 2019-02-15 NOTE — PROVIDER CONTACT NOTE (OTHER) - ASSESSMENT
Patient Axox1-2, disoriented to time, place, situation, confused at times, reoriented as needed. Denies chest pain, SOB, N/V/dizziness, or pain. No s/s of chest pain noted, no dyspnea on exertion noted. lying comfortably in bed, asymptomatic at this time.

## 2019-02-15 NOTE — PROGRESS NOTE ADULT - PROBLEM SELECTOR PLAN 1
With this being positive on repeat and sensitivities still pending will escalate to Zosyn and order repeat blood cultures. Recommend imaging (US or preferably ABD/pelvic CT with contrast)

## 2019-02-15 NOTE — PROGRESS NOTE ADULT - SUBJECTIVE AND OBJECTIVE BOX
GENERAL: No acute distress, well-developed  HEAD:  Atraumatic, Normocephalic  ENT: EOMI, PERRLA, conjunctiva and sclera clear, Neck supple, No JVD, moist mucosa, no pharyngeal erythema, no tonsillar enlargement or exudate  CHEST/LUNG: Clear to auscultation bilaterally; No wheeze, equal breath sounds bilaterally   HEART: Regular rate and rhythm; No murmurs, rubs, or gallops  ABDOMEN: Soft, Nontender, Nondistended; Bowel sounds present, no organomegaly  EXTREMITIES:  2+ Peripheral Pulses, No clubbing, cyanosis, or edema  PSYCH: AAOx1 to person only, pleasant mood  NEUROLOGY: Follows commands, moving all extremities 5/5 strength throughout, normal sensation   SKIN: Normal color, No rashes or lesions She denies any fevers or chills  No N/V  toleratin diet    98.7   85   16   104/56   98% 2LNCO2    GENERAL: No acute distress, well-developed  HEAD:  Atraumatic, Normocephalic  ENT: EOMI, PERRLA, conjunctiva and sclera clear, Neck supple, No JVD, moist mucosa, no pharyngeal erythema, no tonsillar enlargement or exudate  CHEST/LUNG: Clear to auscultation bilaterally; No wheeze, equal breath sounds bilaterally   HEART: Regular rate and rhythm; No murmurs, rubs, or gallops  ABDOMEN: Soft, Nontender, Nondistended; Bowel sounds present, no organomegaly  EXTREMITIES:  2+ Peripheral Pulses, No clubbing, cyanosis, or edema  PSYCH: AAOx1 to person only, pleasant mood  NEUROLOGY: Follows commands, moving all extremities 5/5 strength throughout, normal sensation   SKIN: Normal color, No rashes or lesions

## 2019-02-16 LAB
-  AMIKACIN: SIGNIFICANT CHANGE UP
-  AMPICILLIN/SULBACTAM: SIGNIFICANT CHANGE UP
-  AMPICILLIN: SIGNIFICANT CHANGE UP
-  AZTREONAM: SIGNIFICANT CHANGE UP
-  CEFAZOLIN: SIGNIFICANT CHANGE UP
-  CEFEPIME: SIGNIFICANT CHANGE UP
-  CEFOXITIN: SIGNIFICANT CHANGE UP
-  CEFTAZIDIME: SIGNIFICANT CHANGE UP
-  CEFTRIAXONE: SIGNIFICANT CHANGE UP
-  CIPROFLOXACIN: SIGNIFICANT CHANGE UP
-  ERTAPENEM: SIGNIFICANT CHANGE UP
-  GENTAMICIN: SIGNIFICANT CHANGE UP
-  IMIPENEM: SIGNIFICANT CHANGE UP
-  LEVOFLOXACIN: SIGNIFICANT CHANGE UP
-  MEROPENEM: SIGNIFICANT CHANGE UP
-  PIPERACILLIN/TAZOBACTAM: SIGNIFICANT CHANGE UP
-  TIGECYCLINE: SIGNIFICANT CHANGE UP
-  TOBRAMYCIN: SIGNIFICANT CHANGE UP
-  TRIMETHOPRIM/SULFAMETHOXAZOLE: SIGNIFICANT CHANGE UP
ANION GAP SERPL CALC-SCNC: 12 MMO/L — SIGNIFICANT CHANGE UP (ref 7–14)
BACTERIA BLD CULT: SIGNIFICANT CHANGE UP
BUN SERPL-MCNC: 29 MG/DL — HIGH (ref 7–23)
CALCIUM SERPL-MCNC: 9.9 MG/DL — SIGNIFICANT CHANGE UP (ref 8.4–10.5)
CHLORIDE SERPL-SCNC: 109 MMOL/L — HIGH (ref 98–107)
CO2 SERPL-SCNC: 23 MMOL/L — SIGNIFICANT CHANGE UP (ref 22–31)
CREAT SERPL-MCNC: 1.29 MG/DL — SIGNIFICANT CHANGE UP (ref 0.5–1.3)
E COLI DNA BLD POS QL NAA+NON-PROBE: SIGNIFICANT CHANGE UP
GLUCOSE SERPL-MCNC: 115 MG/DL — HIGH (ref 70–99)
HCT VFR BLD CALC: 37.2 % — SIGNIFICANT CHANGE UP (ref 34.5–45)
HGB BLD-MCNC: 12.3 G/DL — SIGNIFICANT CHANGE UP (ref 11.5–15.5)
MCHC RBC-ENTMCNC: 33.1 % — SIGNIFICANT CHANGE UP (ref 32–36)
MCHC RBC-ENTMCNC: 35.3 PG — HIGH (ref 27–34)
MCV RBC AUTO: 106.9 FL — HIGH (ref 80–100)
METHOD TYPE: SIGNIFICANT CHANGE UP
NRBC # FLD: 0 K/UL — LOW (ref 25–125)
ORGANISM # SPEC MICROSCOPIC CNT: SIGNIFICANT CHANGE UP
PLATELET # BLD AUTO: 138 K/UL — LOW (ref 150–400)
PMV BLD: 11.2 FL — SIGNIFICANT CHANGE UP (ref 7–13)
POTASSIUM SERPL-MCNC: 4 MMOL/L — SIGNIFICANT CHANGE UP (ref 3.5–5.3)
POTASSIUM SERPL-SCNC: 4 MMOL/L — SIGNIFICANT CHANGE UP (ref 3.5–5.3)
RBC # BLD: 3.48 M/UL — LOW (ref 3.8–5.2)
RBC # FLD: 13.2 % — SIGNIFICANT CHANGE UP (ref 10.3–14.5)
SODIUM SERPL-SCNC: 144 MMOL/L — SIGNIFICANT CHANGE UP (ref 135–145)
WBC # BLD: 6.47 K/UL — SIGNIFICANT CHANGE UP (ref 3.8–10.5)
WBC # FLD AUTO: 6.47 K/UL — SIGNIFICANT CHANGE UP (ref 3.8–10.5)

## 2019-02-16 PROCEDURE — 73110 X-RAY EXAM OF WRIST: CPT | Mod: 26,LT

## 2019-02-16 RX ORDER — ACETAMINOPHEN 500 MG
650 TABLET ORAL ONCE
Qty: 0 | Refills: 0 | Status: COMPLETED | OUTPATIENT
Start: 2019-02-16 | End: 2019-02-16

## 2019-02-16 RX ADMIN — Medication 1 MILLIGRAM(S): at 23:28

## 2019-02-16 RX ADMIN — HEPARIN SODIUM 5000 UNIT(S): 5000 INJECTION INTRAVENOUS; SUBCUTANEOUS at 05:57

## 2019-02-16 RX ADMIN — PIPERACILLIN AND TAZOBACTAM 25 GRAM(S): 4; .5 INJECTION, POWDER, LYOPHILIZED, FOR SOLUTION INTRAVENOUS at 13:37

## 2019-02-16 RX ADMIN — PIPERACILLIN AND TAZOBACTAM 25 GRAM(S): 4; .5 INJECTION, POWDER, LYOPHILIZED, FOR SOLUTION INTRAVENOUS at 23:27

## 2019-02-16 RX ADMIN — GABAPENTIN 600 MILLIGRAM(S): 400 CAPSULE ORAL at 11:33

## 2019-02-16 RX ADMIN — LOSARTAN POTASSIUM 50 MILLIGRAM(S): 100 TABLET, FILM COATED ORAL at 05:57

## 2019-02-16 RX ADMIN — Medication 650 MILLIGRAM(S): at 14:22

## 2019-02-16 RX ADMIN — CLOPIDOGREL BISULFATE 75 MILLIGRAM(S): 75 TABLET, FILM COATED ORAL at 11:33

## 2019-02-16 RX ADMIN — Medication 650 MILLIGRAM(S): at 13:37

## 2019-02-16 RX ADMIN — HEPARIN SODIUM 5000 UNIT(S): 5000 INJECTION INTRAVENOUS; SUBCUTANEOUS at 23:20

## 2019-02-16 RX ADMIN — PIPERACILLIN AND TAZOBACTAM 25 GRAM(S): 4; .5 INJECTION, POWDER, LYOPHILIZED, FOR SOLUTION INTRAVENOUS at 05:57

## 2019-02-16 RX ADMIN — ISOSORBIDE MONONITRATE 60 MILLIGRAM(S): 60 TABLET, EXTENDED RELEASE ORAL at 11:33

## 2019-02-16 RX ADMIN — HEPARIN SODIUM 5000 UNIT(S): 5000 INJECTION INTRAVENOUS; SUBCUTANEOUS at 13:37

## 2019-02-16 NOTE — PROGRESS NOTE ADULT - SUBJECTIVE AND OBJECTIVE BOX
infectious diseases progress note:    GOOD THOMPSON is a 87y y. o. Female patient    Patient with no concerning overnight events    Allergies    No Known Allergies    Intolerances        ANTIBIOTICS/RELEVANT:  antimicrobials  piperacillin/tazobactam IVPB. 3.375 Gram(s) IV Intermittent every 8 hours    immunologic:    OTHER:  acetaminophen   Tablet .. 650 milliGRAM(s) Oral every 6 hours PRN  clopidogrel Tablet 75 milliGRAM(s) Oral daily  doxazosin 1 milliGRAM(s) Oral at bedtime  gabapentin 600 milliGRAM(s) Oral daily  heparin  Injectable 5000 Unit(s) SubCutaneous every 8 hours  isosorbide   mononitrate ER Tablet (IMDUR) 60 milliGRAM(s) Oral daily  losartan 50 milliGRAM(s) Oral daily  sodium chloride 0.9%. 1000 milliLiter(s) IV Continuous <Continuous>      Objective:  Vital Signs Last 24 Hrs  T(C): 37 (16 Feb 2019 06:03), Max: 37 (16 Feb 2019 06:03)  T(F): 98.6 (16 Feb 2019 06:03), Max: 98.6 (16 Feb 2019 06:03)  HR: 86 (16 Feb 2019 06:03) (69 - 86)  BP: 186/80 (16 Feb 2019 06:03) (139/75 - 186/80)  BP(mean): --  RR: 17 (16 Feb 2019 06:03) (16 - 18)  SpO2: 96% (16 Feb 2019 06:03) (96% - 100%)    T(C): 37 (02-16-19 @ 06:03), Max: 37 (02-16-19 @ 06:03)  T(C): 37 (02-16-19 @ 06:03), Max: 38.6 (02-13-19 @ 23:41)  T(C): 37 (02-16-19 @ 06:03), Max: 38.6 (02-13-19 @ 23:41)      LABS:                        12.3   6.47  )-----------( 138      ( 16 Feb 2019 06:10 )             37.2       6.47 02-16 @ 06:10  6.54 02-15 @ 05:48  6.49 02-14 @ 05:30  6.94 02-13 @ 17:30      02-16    144  |  109<H>  |  29<H>  ----------------------------<  115<H>  4.0   |  23  |  1.29    Ca    9.9      16 Feb 2019 06:10        Creatinine, Serum: 1.29 mg/dL (02-16-19 @ 06:10)  Creatinine, Serum: 1.13 mg/dL (02-15-19 @ 05:48)  Creatinine, Serum: 1.26 mg/dL (02-14-19 @ 05:30)  Creatinine, Serum: 1.43 mg/dL (02-13-19 @ 17:30)      MICROBIOLOGY:      Culture - Blood (collected 14 Feb 2019 11:59)  Source: BLOOD PERIPHERAL  Preliminary Report (16 Feb 2019 09:38):    EC^Escherichia coli    Culture - Blood (collected 14 Feb 2019 11:59)  Source: BLOOD  Preliminary Report (15 Feb 2019 11:57):    NO ORGANISMS ISOLATED    NO ORGANISMS ISOLATED AT 24 HOURS    Culture - Urine (collected 13 Feb 2019 20:43)  Source: URINE MIDSTREAM  Preliminary Report (15 Feb 2019 09:58):    EC^Escherichia coli    COLONY COUNT: > = 100,000 CFU/ML  Preliminary Report (14 Feb 2019 08:05):    Insufficient growth, culture re-incubated.    Culture - Blood (collected 13 Feb 2019 18:24)  Source: BLOOD PERIPHERAL  Preliminary Report (14 Feb 2019 08:00):    ***Blood Panel PCR results on this specimen are available    approximately 3 hours after the Gram stain result***    Gram stain, PCR, and/or culture results may not always    correspond due to difference in methodologies    ------------------------------------------------------------    This PCR assay was performed using [employer].  The    following targets are tested for:  Enterococcus, vancomycin    resistant enterococci, Listeria monocytogenes,  coagulase    negative staphylococci, S. aureus, methicillin resistant S.    aureus, Streptococcus agalactiae (Group B), S. pneumoniae,    S. pyogenes (Group A), Acinetobacter baumannii, Enterobacter    cloacae, E. coli, Klebsiella oxytoca, K. pneumoniae, Proteus    sp., Serratia marcescens, Haemophilus influenzae, Neisseria    meningitidis, Pseudomonas aeruginosa, Candida albicans, C.    glabrata, C. krusei, C. parapsilosis, C. tropicalis and the    KPC resistance gene.    **NOTE: Due to technical problems, Proteus sp. will NOT be    reported as part of the BCID paneluntil further notice.  Final Report (16 Feb 2019 10:30):    REFER TO  FOR ALYSON COLLETCED ON 2/13/19 AT 1730  Organism: BLOOD CULTURE PCR  Escherichia coli (16 Feb 2019 10:30)  Organism: Escherichia coli (16 Feb 2019 10:30)  Organism: BLOOD CULTURE PCR  ***Blood Panel PCR results on this specimen are available  approximately 3 hours after the Gram stain result***  Gram stain, PCR, and/or culture results may not always  correspond due to difference in methodologies  ------------------------------------------------------------  This PCR assay was performed using [employer].  The  following targets are tested for:  Enterococcus, vancomycin  resistant enterococci, Listeria monocytogenes,  coagulase  negative staphylococci, S. aureus, methicillin resistant S.  aureus, Streptococcus agalactiae (Group B), S. pneumoniae,  S. pyogenes (Group A), Acinetobacter baumannii, Enterobacter  cloacae, E. coli, Klebsiella oxytoca, K. pneumoniae, Proteus  sp., Serratia marcescens, Haemophilus influenzae, Neisseria  meningitidis, Pseudomonas aeruginosa, Candida albicans, C.  glabrata, C. krusei, C. parapsilosis, C. tropicalis and the  KPC resistance gene.  **NOTE: Due to technical problems, Proteus sp. will NOT be  reported as part of the BCID panel until further notice. (16 Feb 2019 10:30)    Culture - Blood (collected 13 Feb 2019 18:13)  Source: BLOOD VENOUS  Preliminary Report (15 Feb 2019 09:24):    EC^Escherichia coli  Organism: Escherichia coli (16 Feb 2019 10:28)  Organism: Escherichia coli (16 Feb 2019 10:28)        RADIOLOGY & ADDITIONAL STUDIES:    < from: US Kidney and Bladder (02.15.19 @ 12:04) >  EXAM:  US KIDNEYS AND BLADDER        PROCEDURE DATE:  Feb 15 2019         INTERPRETATION:  CLINICAL INFORMATION: UTI. Bacteremia. Assess for   hydronephrosis, pyelonephritis or renal calculi.    COMPARISON: CT abdomen and pelvis 8/4/2017.    TECHNIQUE: Sonography of the kidneys and bladder.     FINDINGS:    Right kidney:  9.4 cm. Midpole renal calculus measuring up to 7 mm. Trace   perinephric fluid. No hydronephrosis.    Left kidney:  10.7 cm. No renal mass, hydronephrosis or calculi.    Urinary bladder: Within normal limits.    IMPRESSION:     Nonobstructing right renal calculus. No hydronephrosis.    Trace right perinephric fluid.

## 2019-02-16 NOTE — PROGRESS NOTE ADULT - SUBJECTIVE AND OBJECTIVE BOX
Lt wrist discomfort  No prior hx of gout  tolerating diet    Vital Signs Last 24 Hrs  T(C): 36.7 (16 Feb 2019 11:30), Max: 37 (16 Feb 2019 06:03)  T(F): 98.1 (16 Feb 2019 11:30), Max: 98.6 (16 Feb 2019 06:03)  HR: 69 (16 Feb 2019 11:30) (69 - 86)  BP: 184/69 (16 Feb 2019 11:30) (139/75 - 186/80)  BP(mean): --  RR: 18 (16 Feb 2019 11:30) (16 - 18)  SpO2: 99% (16 Feb 2019 11:30) (96% - 100%)    GENERAL: No acute distress, well-developed  HEAD:  Atraumatic, Normocephalic  ENT: EOMI, PERRLA, conjunctiva and sclera clear, Neck supple, No JVD, moist mucosa, no pharyngeal erythema, no tonsillar enlargement or exudate  CHEST/LUNG: Clear to auscultation bilaterally; No wheeze, equal breath sounds bilaterally   HEART: Regular rate and rhythm; No murmurs, rubs, or gallops  ABDOMEN: Soft, Nontender, Nondistended; Bowel sounds present, no organomegaly  EXTREMITIES:  2+ Peripheral Pulses, mild tenderness when squeezing wrist, but not overly warm to touch nor are there any obvious swelling/effusion  PSYCH: AAOx1 to person only, pleasant mood  NEUROLOGY: Follows commands, moving all extremities 5/5 strength throughout, normal sensation   SKIN: Normal color, No rashes or lesions      LABS:                        12.3   6.47  )-----------( 138      ( 16 Feb 2019 06:10 )             37.2     02-16    144  |  109<H>  |  29<H>  ----------------------------<  115<H>  4.0   |  23  |  1.29    Ca    9.9      16 Feb 2019 06:10        CAPILLARY BLOOD GLUCOSE

## 2019-02-17 LAB
-  AMIKACIN: SIGNIFICANT CHANGE UP
-  AMPICILLIN/SULBACTAM: SIGNIFICANT CHANGE UP
-  AMPICILLIN: SIGNIFICANT CHANGE UP
-  AZTREONAM: SIGNIFICANT CHANGE UP
-  CEFAZOLIN: SIGNIFICANT CHANGE UP
-  CEFEPIME: SIGNIFICANT CHANGE UP
-  CEFOXITIN: SIGNIFICANT CHANGE UP
-  CEFTAZIDIME: SIGNIFICANT CHANGE UP
-  CEFTRIAXONE: SIGNIFICANT CHANGE UP
-  CIPROFLOXACIN: SIGNIFICANT CHANGE UP
-  ERTAPENEM: SIGNIFICANT CHANGE UP
-  GENTAMICIN: SIGNIFICANT CHANGE UP
-  IMIPENEM: SIGNIFICANT CHANGE UP
-  LEVOFLOXACIN: SIGNIFICANT CHANGE UP
-  MEROPENEM: SIGNIFICANT CHANGE UP
-  NITROFURANTOIN: SIGNIFICANT CHANGE UP
-  PIPERACILLIN/TAZOBACTAM: SIGNIFICANT CHANGE UP
-  TIGECYCLINE: SIGNIFICANT CHANGE UP
-  TOBRAMYCIN: SIGNIFICANT CHANGE UP
-  TRIMETHOPRIM/SULFAMETHOXAZOLE: SIGNIFICANT CHANGE UP
ANION GAP SERPL CALC-SCNC: 18 MMO/L — HIGH (ref 7–14)
BACTERIA BLD CULT: SIGNIFICANT CHANGE UP
BACTERIA UR CULT: SIGNIFICANT CHANGE UP
BUN SERPL-MCNC: 25 MG/DL — HIGH (ref 7–23)
CALCIUM SERPL-MCNC: 9.8 MG/DL — SIGNIFICANT CHANGE UP (ref 8.4–10.5)
CHLORIDE SERPL-SCNC: 106 MMOL/L — SIGNIFICANT CHANGE UP (ref 98–107)
CO2 SERPL-SCNC: 17 MMOL/L — LOW (ref 22–31)
CREAT SERPL-MCNC: 1.19 MG/DL — SIGNIFICANT CHANGE UP (ref 0.5–1.3)
GLUCOSE SERPL-MCNC: 140 MG/DL — HIGH (ref 70–99)
HCT VFR BLD CALC: 44.2 % — SIGNIFICANT CHANGE UP (ref 34.5–45)
HGB BLD-MCNC: 13.9 G/DL — SIGNIFICANT CHANGE UP (ref 11.5–15.5)
MCHC RBC-ENTMCNC: 31.4 % — LOW (ref 32–36)
MCHC RBC-ENTMCNC: 34.6 PG — HIGH (ref 27–34)
MCV RBC AUTO: 110 FL — HIGH (ref 80–100)
METHOD TYPE: SIGNIFICANT CHANGE UP
NRBC # FLD: 0 K/UL — LOW (ref 25–125)
ORGANISM # SPEC MICROSCOPIC CNT: SIGNIFICANT CHANGE UP
ORGANISM # SPEC MICROSCOPIC CNT: SIGNIFICANT CHANGE UP
PLATELET # BLD AUTO: 150 K/UL — SIGNIFICANT CHANGE UP (ref 150–400)
PMV BLD: 11.3 FL — SIGNIFICANT CHANGE UP (ref 7–13)
POTASSIUM SERPL-MCNC: 4.1 MMOL/L — SIGNIFICANT CHANGE UP (ref 3.5–5.3)
POTASSIUM SERPL-SCNC: 4.1 MMOL/L — SIGNIFICANT CHANGE UP (ref 3.5–5.3)
RBC # BLD: 4.02 M/UL — SIGNIFICANT CHANGE UP (ref 3.8–5.2)
RBC # FLD: 12.9 % — SIGNIFICANT CHANGE UP (ref 10.3–14.5)
SODIUM SERPL-SCNC: 141 MMOL/L — SIGNIFICANT CHANGE UP (ref 135–145)
SPECIMEN SOURCE: SIGNIFICANT CHANGE UP
SPECIMEN SOURCE: SIGNIFICANT CHANGE UP
URATE SERPL-MCNC: 5.2 MG/DL — SIGNIFICANT CHANGE UP (ref 2.5–7)
WBC # BLD: 7.85 K/UL — SIGNIFICANT CHANGE UP (ref 3.8–10.5)
WBC # FLD AUTO: 7.85 K/UL — SIGNIFICANT CHANGE UP (ref 3.8–10.5)

## 2019-02-17 RX ORDER — AMOXICILLIN 250 MG/5ML
500 SUSPENSION, RECONSTITUTED, ORAL (ML) ORAL
Qty: 0 | Refills: 0 | Status: DISCONTINUED | OUTPATIENT
Start: 2019-02-17 | End: 2019-02-21

## 2019-02-17 RX ORDER — AMOXICILLIN 250 MG/5ML
1000 SUSPENSION, RECONSTITUTED, ORAL (ML) ORAL THREE TIMES A DAY
Qty: 0 | Refills: 0 | Status: DISCONTINUED | OUTPATIENT
Start: 2019-02-17 | End: 2019-02-17

## 2019-02-17 RX ORDER — LOSARTAN POTASSIUM 100 MG/1
25 TABLET, FILM COATED ORAL ONCE
Qty: 0 | Refills: 0 | Status: COMPLETED | OUTPATIENT
Start: 2019-02-17 | End: 2019-02-17

## 2019-02-17 RX ORDER — HALOPERIDOL DECANOATE 100 MG/ML
1 INJECTION INTRAMUSCULAR ONCE
Qty: 0 | Refills: 0 | Status: COMPLETED | OUTPATIENT
Start: 2019-02-17 | End: 2019-02-17

## 2019-02-17 RX ORDER — LOSARTAN POTASSIUM 100 MG/1
75 TABLET, FILM COATED ORAL DAILY
Qty: 0 | Refills: 0 | Status: DISCONTINUED | OUTPATIENT
Start: 2019-02-18 | End: 2019-02-21

## 2019-02-17 RX ADMIN — PIPERACILLIN AND TAZOBACTAM 25 GRAM(S): 4; .5 INJECTION, POWDER, LYOPHILIZED, FOR SOLUTION INTRAVENOUS at 07:20

## 2019-02-17 RX ADMIN — HEPARIN SODIUM 5000 UNIT(S): 5000 INJECTION INTRAVENOUS; SUBCUTANEOUS at 07:20

## 2019-02-17 RX ADMIN — HEPARIN SODIUM 5000 UNIT(S): 5000 INJECTION INTRAVENOUS; SUBCUTANEOUS at 13:04

## 2019-02-17 RX ADMIN — ISOSORBIDE MONONITRATE 60 MILLIGRAM(S): 60 TABLET, EXTENDED RELEASE ORAL at 11:39

## 2019-02-17 RX ADMIN — HALOPERIDOL DECANOATE 1 MILLIGRAM(S): 100 INJECTION INTRAMUSCULAR at 09:40

## 2019-02-17 RX ADMIN — GABAPENTIN 600 MILLIGRAM(S): 400 CAPSULE ORAL at 11:39

## 2019-02-17 RX ADMIN — CLOPIDOGREL BISULFATE 75 MILLIGRAM(S): 75 TABLET, FILM COATED ORAL at 11:39

## 2019-02-17 RX ADMIN — LOSARTAN POTASSIUM 25 MILLIGRAM(S): 100 TABLET, FILM COATED ORAL at 11:39

## 2019-02-17 RX ADMIN — Medication 500 MILLIGRAM(S): at 18:18

## 2019-02-17 RX ADMIN — Medication 1 MILLIGRAM(S): at 22:01

## 2019-02-17 RX ADMIN — HEPARIN SODIUM 5000 UNIT(S): 5000 INJECTION INTRAVENOUS; SUBCUTANEOUS at 22:49

## 2019-02-17 RX ADMIN — LOSARTAN POTASSIUM 50 MILLIGRAM(S): 100 TABLET, FILM COATED ORAL at 07:20

## 2019-02-17 NOTE — PROGRESS NOTE ADULT - SUBJECTIVE AND OBJECTIVE BOX
Temp curve favorable last night    Vital Signs Last 24 Hrs  T(C): 36.7 (17 Feb 2019 06:45), Max: 37 (16 Feb 2019 23:27)  T(F): 98 (17 Feb 2019 06:45), Max: 98.6 (16 Feb 2019 23:27)  HR: 75 (17 Feb 2019 09:42) (75 - 87)  BP: 166/99 (17 Feb 2019 09:42) (143/62 - 192/89)  BP(mean): --  RR: 18 (17 Feb 2019 09:42) (18 - 18)  SpO2: 100% (17 Feb 2019 09:42) (97% - 100%)    GENERAL: No acute distress, well-developed  HEENT: NCAT, EOMI  Neck: supple without JVD  CHEST/LUNG: Clear to auscultation bilaterally; No wheeze, equal breath sounds bilaterally   HEART: Regular rate and rhythm; No murmurs, rubs, or gallops  ABDOMEN: Soft, Nontender, Nondistended; Bowel sounds present, no organomegaly  EXTREMITIES:  2+ Peripheral Pulses, mild tenderness when squeezing wrist, but not overly warm to touch nor are there any obvious swelling/effusion  PSYCH: AAOx1 to person only, pleasant mood  NEUROLOGY: Follows commands, moving all extremities 5/5 strength throughout, normal sensation   SKIN: Normal color, No rashes or lesions    LABS:                        13.9   7.85  )-----------( 150      ( 17 Feb 2019 06:20 )             44.2     02-17    141  |  106  |  25<H>  ----------------------------<  140<H>  4.1   |  17<L>  |  1.19    Ca    9.8      17 Feb 2019 06:20        CAPILLARY BLOOD GLUCOSE

## 2019-02-17 NOTE — PROGRESS NOTE ADULT - PROBLEM SELECTOR PLAN 1
Based on sensitivities will change to PO and recommend  Amoxicillin 1 gram PO TID until 2/27  -would verify last set of blood cultures from 2/16 are negative at 48 hours before considering discharge Based on sensitivities will change to PO and recommend  Amoxicillin 500mg PO BID until 2/27 (adjusted for renal function)  -will verify last set of blood cultures from 2/16 are negative at 48 hours before considering recommendation for discharge

## 2019-02-17 NOTE — PROGRESS NOTE ADULT - SUBJECTIVE AND OBJECTIVE BOX
infectious diseases progress note:    GOOD THOMPSON is a 87y y. o. Female patient    Patient reports: "just trying to get out of bed"    ROS:    EYES:  Negative  blurry vision or double vision  GASTROINTESTINAL:  Negative for nausea, vomiting, diarrhea  -otherwise negative except for subjective    Allergies    No Known Allergies    Intolerances        ANTIBIOTICS/RELEVANT:  antimicrobials    immunologic:    OTHER:  acetaminophen   Tablet .. 650 milliGRAM(s) Oral every 6 hours PRN  clopidogrel Tablet 75 milliGRAM(s) Oral daily  doxazosin 1 milliGRAM(s) Oral at bedtime  gabapentin 600 milliGRAM(s) Oral daily  heparin  Injectable 5000 Unit(s) SubCutaneous every 8 hours  isosorbide   mononitrate ER Tablet (IMDUR) 60 milliGRAM(s) Oral daily  losartan 50 milliGRAM(s) Oral daily  sodium chloride 0.9%. 1000 milliLiter(s) IV Continuous <Continuous>      Objective:  Vital Signs Last 24 Hrs  T(C): 36.7 (17 Feb 2019 06:45), Max: 37 (16 Feb 2019 23:27)  T(F): 98 (17 Feb 2019 06:45), Max: 98.6 (16 Feb 2019 23:27)  HR: 75 (17 Feb 2019 09:42) (69 - 87)  BP: 166/99 (17 Feb 2019 09:42) (143/62 - 192/89)  BP(mean): --  RR: 18 (17 Feb 2019 09:42) (18 - 18)  SpO2: 100% (17 Feb 2019 09:42) (97% - 100%)    T(C): 36.7 (02-17-19 @ 06:45), Max: 37 (02-16-19 @ 06:03)  T(C): 36.7 (02-17-19 @ 06:45), Max: 37 (02-16-19 @ 06:03)  T(C): 36.7 (02-17-19 @ 06:45), Max: 38.6 (02-13-19 @ 23:41)    PHYSICAL EXAM:  Constitutional: Well-developed, well nourished  Eyes: PERRLA, EOMI  Ear/Nose/Throat: oropharynx normal	  Neck: no JVD, no lymphadenopathy, supple  Respiratory: no accessory muscle use  Cardiovascular: RRR,   Gastrointestinal: soft, NT  Extremities: no clubbing, no cyanosis, edema absent  Neuro: patient a little confused      LABS:                        13.9   7.85  )-----------( 150      ( 17 Feb 2019 06:20 )             44.2       7.85 02-17 @ 06:20  6.47 02-16 @ 06:10  6.54 02-15 @ 05:48  6.49 02-14 @ 05:30  6.94 02-13 @ 17:30      02-17    141  |  106  |  25<H>  ----------------------------<  140<H>  4.1   |  17<L>  |  1.19    Ca    9.8      17 Feb 2019 06:20        Creatinine, Serum: 1.19 mg/dL (02-17-19 @ 06:20)  Creatinine, Serum: 1.29 mg/dL (02-16-19 @ 06:10)  Creatinine, Serum: 1.13 mg/dL (02-15-19 @ 05:48)  Creatinine, Serum: 1.26 mg/dL (02-14-19 @ 05:30)  Creatinine, Serum: 1.43 mg/dL (02-13-19 @ 17:30)                MICROBIOLOGY:        Culture - Urine (02.13.19 @ 20:43)    -  Amikacin: S <=8 ALYSON    -  Ampicillin: S <=2 ALYSON    -  Ampicillin/Sulbactam: S <=4/2 ALYSON    -  Aztreonam: S <=4 ALYSON    -  Cefazolin: S <=16 ALYSON    -  Cefepime: S <=2 ALYSON    -  Cefoxitin: S <=4 ALYSON    -  Ceftazidime: S <=1 ALYSON    -  Trimethoprim/Sulfamethoxazole: S <=0.5/9.5 ALYSON    -  Tigecycline: S <=1 ALYSON    -  Tobramycin: S <=2 ALYSON    Culture - Urine:   Insufficient growth, culture re-incubated.    Culture - Urine:   COLONY COUNT: > = 100,000 CFU/ML    -  Ceftriaxone: S <=1 ALYSON    -  Ciprofloxacin: S <=0.5 ALYSON    -  Ertapenem: S <=0.5 ALYSON    -  Gentamicin: S <=1 ALYSON    -  Imipenem: S <=1 ALYSON    -  Levofloxacin: S <=1 ALYSON    -  Meropenem: S <=1 ALYSON    -  Nitrofurantoin: S <=32 ALYSON    -  Piperacillin/Tazobactam: S <=8 ALYSON    Specimen Source: URINE MIDSTREAM    Organism Identification: Escherichia coli    Organism: Escherichia coli    Method Type: NEGATIVE ALYSON 43      Culture - Blood (02.13.19 @ 18:13)    -  Amikacin: S <=8 ALYSON    -  Aztreonam: S <=4 ALYSON    -  Ampicillin: S <=2 ALYSON    -  Ampicillin/Sulbactam: S <=4/2 ALYSON    -  Ceftazidime: S <=1 ALYSON    -  Cefoxitin: S <=4 ALYSON    -  Cefepime: S <=2 ALYSON    -  Cefazolin: S <=2 ALYSON    -  Trimethoprim/Sulfamethoxazole: S <=0.5/9.5 ALYSON    -  Tobramycin: S <=2 ALYSON    -  Tigecycline: S <=1 ALYSON    Culture - Blood:   EC^Escherichia coli    -  Piperacillin/Tazobactam: S <=8 ALYSON    -  Meropenem: S <=1 ALYSON    -  Levofloxacin: S <=1 ALYSON    -  Imipenem: S <=1 ALYSON    -  Gentamicin: S <=1 ALYSON    -  Ertapenem: S <=0.5 ALYSON    -  Ciprofloxacin: S <=0.5 ALYSON    -  Ceftriaxone: S <=1 ALYSON    Specimen Source: BLOOD VENOUS    Organism: Escherichia coli    Gram Stain Blood:   ***** CRITICAL RESULT *****  PERSON CALLED / READ-BACK: COLT MCCONNELL RN./Y  DATE / TIME CALLED: 02/14/19 0950  CALLED BY: RASHID HOLLOWAY^Gram Neg Rods  AFTER: 14 HOURS INCUBATION  BOTTLE: AEROBIC   ANAEROBIC BOTTLES    Organism Identification: Escherichia coli    Method Type: NEGATIVE ALYSON 43        RADIOLOGY & ADDITIONAL STUDIES:

## 2019-02-18 ENCOUNTER — TRANSCRIPTION ENCOUNTER (OUTPATIENT)
Age: 84
End: 2019-02-18

## 2019-02-18 LAB
ANION GAP SERPL CALC-SCNC: 16 MMO/L — HIGH (ref 7–14)
BUN SERPL-MCNC: 20 MG/DL — SIGNIFICANT CHANGE UP (ref 7–23)
CALCIUM SERPL-MCNC: 9.9 MG/DL — SIGNIFICANT CHANGE UP (ref 8.4–10.5)
CHLORIDE SERPL-SCNC: 107 MMOL/L — SIGNIFICANT CHANGE UP (ref 98–107)
CO2 SERPL-SCNC: 21 MMOL/L — LOW (ref 22–31)
CREAT SERPL-MCNC: 1.06 MG/DL — SIGNIFICANT CHANGE UP (ref 0.5–1.3)
FOLATE SERPL-MCNC: > 20 NG/ML — HIGH (ref 4.7–20)
GLUCOSE SERPL-MCNC: 108 MG/DL — HIGH (ref 70–99)
HCT VFR BLD CALC: 37.3 % — SIGNIFICANT CHANGE UP (ref 34.5–45)
HGB BLD-MCNC: 12.5 G/DL — SIGNIFICANT CHANGE UP (ref 11.5–15.5)
MCHC RBC-ENTMCNC: 33.5 % — SIGNIFICANT CHANGE UP (ref 32–36)
MCHC RBC-ENTMCNC: 34.7 PG — HIGH (ref 27–34)
MCV RBC AUTO: 103.6 FL — HIGH (ref 80–100)
NRBC # FLD: 0 K/UL — LOW (ref 25–125)
PLATELET # BLD AUTO: 174 K/UL — SIGNIFICANT CHANGE UP (ref 150–400)
PMV BLD: 10.9 FL — SIGNIFICANT CHANGE UP (ref 7–13)
POTASSIUM SERPL-MCNC: 3.6 MMOL/L — SIGNIFICANT CHANGE UP (ref 3.5–5.3)
POTASSIUM SERPL-SCNC: 3.6 MMOL/L — SIGNIFICANT CHANGE UP (ref 3.5–5.3)
RBC # BLD: 3.6 M/UL — LOW (ref 3.8–5.2)
RBC # FLD: 12.8 % — SIGNIFICANT CHANGE UP (ref 10.3–14.5)
SODIUM SERPL-SCNC: 144 MMOL/L — SIGNIFICANT CHANGE UP (ref 135–145)
SPECIMEN SOURCE: SIGNIFICANT CHANGE UP
VIT B12 SERPL-MCNC: 623 PG/ML — SIGNIFICANT CHANGE UP (ref 200–900)
WBC # BLD: 8.61 K/UL — SIGNIFICANT CHANGE UP (ref 3.8–10.5)
WBC # FLD AUTO: 8.61 K/UL — SIGNIFICANT CHANGE UP (ref 3.8–10.5)

## 2019-02-18 RX ADMIN — GABAPENTIN 600 MILLIGRAM(S): 400 CAPSULE ORAL at 11:06

## 2019-02-18 RX ADMIN — Medication 500 MILLIGRAM(S): at 06:12

## 2019-02-18 RX ADMIN — ISOSORBIDE MONONITRATE 60 MILLIGRAM(S): 60 TABLET, EXTENDED RELEASE ORAL at 11:07

## 2019-02-18 RX ADMIN — Medication 1 MILLIGRAM(S): at 22:47

## 2019-02-18 RX ADMIN — Medication 650 MILLIGRAM(S): at 11:07

## 2019-02-18 RX ADMIN — CLOPIDOGREL BISULFATE 75 MILLIGRAM(S): 75 TABLET, FILM COATED ORAL at 11:06

## 2019-02-18 RX ADMIN — HEPARIN SODIUM 5000 UNIT(S): 5000 INJECTION INTRAVENOUS; SUBCUTANEOUS at 06:12

## 2019-02-18 RX ADMIN — Medication 500 MILLIGRAM(S): at 17:00

## 2019-02-18 RX ADMIN — LOSARTAN POTASSIUM 75 MILLIGRAM(S): 100 TABLET, FILM COATED ORAL at 06:12

## 2019-02-18 RX ADMIN — HEPARIN SODIUM 5000 UNIT(S): 5000 INJECTION INTRAVENOUS; SUBCUTANEOUS at 13:18

## 2019-02-18 NOTE — DISCHARGE NOTE ADULT - PLAN OF CARE
Likely due to urinary tract infection. Continue antibiotics as directed and monitor for signs/symptoms of infection, such as, fever/chills, burning/pain with urination, urinary frequency/hesitancy, cloudy urine, or blood in urine. Likely due to infection, as above You were seen for a fall. Maintain a safe environment. Do not change positions quickly. Participate in program recommended by physical therapy Continue current blood pressure medication regimen as directed. Monitor for any visual changes, headaches or dizziness.  Monitor blood pressure regularly.  Follow up with your PCP for further management for high blood pressure, please call to make appointment within 1 week of discharge stable remain free of falls resolution Likely due to urinary tract infection. Continue antibiotics as directed amoxicillin 500mg every 12 hours (2x/day) thru 2/27/19 as per the infectious disease doctor and monitor for signs/symptoms of infection, such as, fever/chills, burning/pain with urination, urinary frequency/hesitancy, cloudy urine, or blood in urine.  Follow up with infectious disease outpatient upon completion of antibiotics after 2/27/19, call for appointment with Dr. Foy

## 2019-02-18 NOTE — DISCHARGE NOTE ADULT - CARE PROVIDERS DIRECT ADDRESSES
[email or web address]

## 2019-02-18 NOTE — PROGRESS NOTE ADULT - SUBJECTIVE AND OBJECTIVE BOX
A bit lethargic  Received haldol x 1 dose yesterday @ 9:40 AM    Vital Signs Last 24 Hrs  T(C): 37.1 (18 Feb 2019 13:47), Max: 37.6 (17 Feb 2019 22:03)  T(F): 98.8 (18 Feb 2019 13:47), Max: 99.7 (17 Feb 2019 22:03)  HR: 55 (18 Feb 2019 13:47) (55 - 99)  BP: 132/63 (18 Feb 2019 13:47) (128/93 - 195/77)  BP(mean): --  RR: 18 (18 Feb 2019 13:47) (17 - 18)  SpO2: 100% (18 Feb 2019 13:47) (95% - 100%)    GENERAL: More lethargic today  HEENT: NCAT, EOMI  Neck: supple without JVD  CHEST/LUNG: Clear to auscultation bilaterally; No wheeze, equal breath sounds bilaterally   HEART: Regular rate and rhythm; No murmurs, rubs, or gallops  ABDOMEN: Soft, Nontender, Nondistended; Bowel sounds present, no organomegaly  EXTREMITIES:  2+ Peripheral Pulses, mild tenderness when squeezing wrist, but not overly warm to touch nor are there any obvious swelling/effusion  NEUROLOGY: lethargy preventing neuro exam  SKIN: Normal color, No rashes or lesions    LABS:                        12.5   8.61  )-----------( 174      ( 18 Feb 2019 06:45 )             37.3     02-18    144  |  107  |  20  ----------------------------<  108<H>  3.6   |  21<L>  |  1.06    Ca    9.9      18 Feb 2019 06:45        CAPILLARY BLOOD GLUCOSE

## 2019-02-18 NOTE — DISCHARGE NOTE ADULT - ADDITIONAL INSTRUCTIONS
Lasix was held- continue to hold lasix until you follow up with your PCP once discharged from rehab.

## 2019-02-18 NOTE — DISCHARGE NOTE ADULT - MEDICATION SUMMARY - MEDICATIONS TO STOP TAKING
I will STOP taking the medications listed below when I get home from the hospital:    furosemide 40 mg oral tablet  -- 1 tab(s) by mouth 2 times a day    celecoxib 200 mg oral capsule  -- 1 cap(s) by mouth once a day

## 2019-02-18 NOTE — DISCHARGE NOTE ADULT - PATIENT PORTAL LINK FT
You can access the [facility], offered by [facility], by registering with the following website: [email or web address]

## 2019-02-18 NOTE — PROGRESS NOTE ADULT - SUBJECTIVE AND OBJECTIVE BOX
infectious diseases progress note:    GOOD THOMPSON is a 87y y. o. Female patient    Patient with no concerning overnight events    Allergies    No Known Allergies    Intolerances        ANTIBIOTICS/RELEVANT:  antimicrobials  amoxicillin 500 milliGRAM(s) Oral two times a day    immunologic:    OTHER:  acetaminophen   Tablet .. 650 milliGRAM(s) Oral every 6 hours PRN  clopidogrel Tablet 75 milliGRAM(s) Oral daily  doxazosin 1 milliGRAM(s) Oral at bedtime  gabapentin 600 milliGRAM(s) Oral daily  heparin  Injectable 5000 Unit(s) SubCutaneous every 8 hours  isosorbide   mononitrate ER Tablet (IMDUR) 60 milliGRAM(s) Oral daily  losartan 75 milliGRAM(s) Oral daily  sodium chloride 0.9%. 1000 milliLiter(s) IV Continuous <Continuous>      Objective:  Vital Signs Last 24 Hrs  T(C): 36.9 (18 Feb 2019 06:05), Max: 37.6 (17 Feb 2019 22:03)  T(F): 98.5 (18 Feb 2019 06:05), Max: 99.7 (17 Feb 2019 22:03)  HR: 75 (18 Feb 2019 08:26) (59 - 75)  BP: 170/80 (18 Feb 2019 09:10) (128/93 - 190/119)  BP(mean): --  RR: 17 (18 Feb 2019 06:05) (17 - 18)  SpO2: 100% (18 Feb 2019 06:05) (95% - 100%)    T(C): 36.9 (02-18-19 @ 06:05), Max: 37.6 (02-17-19 @ 22:03)  T(C): 36.9 (02-18-19 @ 06:05), Max: 37.6 (02-17-19 @ 22:03)  T(C): 36.9 (02-18-19 @ 06:05), Max: 37.6 (02-17-19 @ 22:03)      LABS:                        12.5   8.61  )-----------( 174      ( 18 Feb 2019 06:45 )             37.3       8.61 02-18 @ 06:45  7.85 02-17 @ 06:20  6.47 02-16 @ 06:10  6.54 02-15 @ 05:48  6.49 02-14 @ 05:30  6.94 02-13 @ 17:30      02-18    144  |  107  |  20  ----------------------------<  108<H>  3.6   |  21<L>  |  1.06    Ca    9.9      18 Feb 2019 06:45        Creatinine, Serum: 1.06 mg/dL (02-18-19 @ 06:45)  Creatinine, Serum: 1.19 mg/dL (02-17-19 @ 06:20)  Creatinine, Serum: 1.29 mg/dL (02-16-19 @ 06:10)  Creatinine, Serum: 1.13 mg/dL (02-15-19 @ 05:48)  Creatinine, Serum: 1.26 mg/dL (02-14-19 @ 05:30)  Creatinine, Serum: 1.43 mg/dL (02-13-19 @ 17:30)    MICROBIOLOGY:      Culture - Blood (collected 17 Feb 2019 09:25)  Source: BLOOD PERIPHERAL  Preliminary Report (18 Feb 2019 09:23):    NO ORGANISMS ISOLATED    NO ORGANISMS ISOLATED AT 24 HOURS    Culture - Blood (collected 16 Feb 2019 11:22)  Source: BLOOD PERIPHERAL  Preliminary Report (17 Feb 2019 11:20):    NO ORGANISMS ISOLATED    NO ORGANISMS ISOLATED AT 24 HOURS    Culture - Blood (collected 16 Feb 2019 09:46)  Source: BLOOD VENOUS  Preliminary Report (18 Feb 2019 09:44):    NO ORGANISMS ISOLATED    NO ORGANISMS ISOLATED AT 48 HRS.        RADIOLOGY & ADDITIONAL STUDIES:

## 2019-02-18 NOTE — DISCHARGE NOTE ADULT - CARE PROVIDER_API CALL
Shamar Foy; PhD)  Infectious Disease; Internal Medicine  [address]  [place] [address]  Phone: [phone]  Fax: [phone]  Follow Up Time:     Rodney Magallanes)  Internal Medicine  [address] [place] [address], [address]  [place] [address]  Phone: [phone]  Fax: [phone]  Follow Up Time:

## 2019-02-18 NOTE — PROGRESS NOTE ADULT - PROBLEM SELECTOR PLAN 1
Based on sensitivities will change to PO and recommend  Amoxicillin 500mg PO BID until 2/27 (adjusted for renal function)  - last set of blood cultures from 2/16 are negative at 48 hours   From an ID standpoint no further requirement for inpatient status for the management of ID issues. Fine with discharge from ID standpoint when other medical issues no longer require inpatient care and social issues allow for a safe discharge plan.

## 2019-02-18 NOTE — DISCHARGE NOTE ADULT - MEDICATION SUMMARY - MEDICATIONS TO CHANGE
I will SWITCH the dose or number of times a day I take the medications listed below when I get home from the hospital:  None I will SWITCH the dose or number of times a day I take the medications listed below when I get home from the hospital:    losartan 50 mg oral tablet  -- 1 tab(s) by mouth once a day

## 2019-02-18 NOTE — DISCHARGE NOTE ADULT - HOSPITAL COURSE
88 yo F with h/o HTN, TIA, prior GI and retroperitoneal bleed brought to ED by her  for weakness and confusion.    Hospital course: 88 yo F with h/o HTN, TIA, prior GI and retroperitoneal bleed brought to ED by her  for weakness and confusion.    Hospital course:     Urinary tract infection/bactermia   - Pt also febrile   - Abx: s/p IV abx, switched to PO amox through 2/27 per ID   - f/u urine culture- GNR  -2/15 since repeat BCX still growing GNR- escalated to zosyn- f/u repeat bcx 2/16: NGTD  -US abd- Nonobstructing right renal calculus. No hydronephrosis. Trace right perinephric fluid.  - Repeat blood cx 2/16 negative, 2/17 & 2/20 NTD  -ID- Based on sensitivities will change to PO and recommend  Amoxicillin 500mg PO BID until 2/27 (adjusted for renal function)  -2/20 1am- rectal temp 100.4- RVP negative, CXR no definitive infection, WBC WNL  -2/21 afebrile- stable for discharge    Fall, initial encounter  - In setting of infection and encephalopathy. No LOC or head trauma per . CTH and x-rays neg  - Fall precaution  - PT: rec rehab     Encephalopathy  - In setting of UTI/bactermia  - Low suspicion for primary CNS process  - Continue antibiotics. Monitor clinically    Essential hypertension  - Monitor BP  - verified with pharmacy that pt takes losartan 50mg daily and isosorbide mononitrate ER 60mg daily    LUE swelling  Duplex LUE- negative for DVT    PT- rehab- stable for discharge- d/w Dr romero and S 88 yo F with h/o HTN, TIA, prior GI and retroperitoneal bleed brought to ED by her  for weakness and confusion.    Hospital course:     Urinary tract infection/bactermia   - Pt also febrile   - Abx: s/p IV abx, switched to PO amox through 2/27 per ID   - f/u urine culture- GNR  -2/15 since repeat BCX still growing GNR- escalated to zosyn- f/u repeat bcx 2/16: NGTD  -US abd- Nonobstructing right renal calculus. No hydronephrosis. Trace right perinephric fluid.  - Repeat blood cx 2/16 negative, 2/17 & 2/20 NTD  -ID- Based on sensitivities will change to PO and recommend  Amoxicillin 500mg PO BID until 2/27 (adjusted for renal function)  -2/20 1am- rectal temp 100.4- RVP negative, CXR no definitive infection, WBC WNL  -2/21 afebrile- stable for discharge    Fall, initial encounter  - In setting of infection and encephalopathy. No LOC or head trauma per . CTH and x-rays neg  - Fall precaution  - PT: rec rehab     Encephalopathy  - In setting of UTI/bactermia  - Low suspicion for primary CNS process  - Continue antibiotics. Monitor clinically    Essential hypertension  - Monitor BP  - verified with pharmacy that pt takes losartan 50mg daily and isosorbide mononitrate ER 60mg daily    LUE swelling  Duplex LUE- negative for DVT    PT- rehab- stable for discharge- d/w Dr romero and CLAUDIA ag

## 2019-02-18 NOTE — DISCHARGE NOTE ADULT - CARE PLAN
Principal Discharge DX:	Bacteremia  Assessment and plan of treatment:	Likely due to urinary tract infection. Continue antibiotics as directed and monitor for signs/symptoms of infection, such as, fever/chills, burning/pain with urination, urinary frequency/hesitancy, cloudy urine, or blood in urine.  Secondary Diagnosis:	Encephalopathy  Assessment and plan of treatment:	Likely due to infection, as above  Secondary Diagnosis:	Fall, initial encounter  Assessment and plan of treatment:	You were seen for a fall. Maintain a safe environment. Do not change positions quickly. Participate in program recommended by physical therapy  Secondary Diagnosis:	Essential hypertension  Assessment and plan of treatment:	Continue current blood pressure medication regimen as directed. Monitor for any visual changes, headaches or dizziness.  Monitor blood pressure regularly.  Follow up with your PCP for further management for high blood pressure, please call to make appointment within 1 week of discharge Principal Discharge DX:	Bacteremia  Goal:	resolution  Assessment and plan of treatment:	Likely due to urinary tract infection. Continue antibiotics as directed amoxicillin 500mg every 12 hours (2x/day) thru 2/27/19 as per the infectious disease doctor and monitor for signs/symptoms of infection, such as, fever/chills, burning/pain with urination, urinary frequency/hesitancy, cloudy urine, or blood in urine.  Follow up with infectious disease outpatient upon completion of antibiotics after 2/27/19, call for appointment with Dr. Foy  Secondary Diagnosis:	Encephalopathy  Goal:	stable  Assessment and plan of treatment:	Likely due to infection, as above  Secondary Diagnosis:	Fall, initial encounter  Goal:	remain free of falls  Assessment and plan of treatment:	You were seen for a fall. Maintain a safe environment. Do not change positions quickly. Participate in program recommended by physical therapy  Secondary Diagnosis:	Essential hypertension  Goal:	stable  Assessment and plan of treatment:	Continue current blood pressure medication regimen as directed. Monitor for any visual changes, headaches or dizziness.  Monitor blood pressure regularly.  Follow up with your PCP for further management for high blood pressure, please call to make appointment within 1 week of discharge

## 2019-02-18 NOTE — DISCHARGE NOTE ADULT - MEDICATION SUMMARY - MEDICATIONS TO TAKE
I will START or STAY ON the medications listed below when I get home from the hospital:    acetaminophen 325 mg oral tablet  -- 2 tab(s) by mouth every 6 hours, As needed, Temp greater or equal to 38C (100.4F), Mild Pain (1 - 3), Moderate Pain (4 - 6)  -- Indication: For Fever or pain    losartan 50 mg oral tablet  -- 1 tab(s) by mouth once a day  -- Indication: For Essential hypertension    doxazosin 1 mg oral tablet  -- 1 tab(s) by mouth once a day (at bedtime)  -- Indication: For Essential hypertension    isosorbide mononitrate 60 mg oral tablet, extended release  -- 1 tab(s) by mouth once a day  -- Indication: For Essential hypertension    gabapentin 300 mg oral capsule  -- 1 cap(s) by mouth 3 times a day  -- Indication: For Neuropathy    clopidogrel 75 mg oral tablet  -- 1 tab(s) by mouth once a day  -- Indication: For Blood thinner    amoxicillin 500 mg oral capsule  -- 1 cap(s) by mouth 2 times a day thru 2/27/19  -- Indication: For Bacteremia I will START or STAY ON the medications listed below when I get home from the hospital:    acetaminophen 325 mg oral tablet  -- 2 tab(s) by mouth every 6 hours, As needed, Temp greater or equal to 38C (100.4F), Mild Pain (1 - 3), Moderate Pain (4 - 6)  -- Indication: For Fever or pain    losartan 25 mg oral tablet  -- 3 tab(s) by mouth once a day  -- Indication: For Essential hypertension    doxazosin 1 mg oral tablet  -- 1 tab(s) by mouth once a day (at bedtime)  -- Indication: For Essential hypertension    isosorbide mononitrate 60 mg oral tablet, extended release  -- 1 tab(s) by mouth once a day  -- Indication: For Essential hypertension    gabapentin 300 mg oral capsule  -- 1 cap(s) by mouth 3 times a day  -- Indication: For Neuropathy    clopidogrel 75 mg oral tablet  -- 1 tab(s) by mouth once a day  -- Indication: For Blood thinner    amoxicillin 500 mg oral capsule  -- 1 cap(s) by mouth 2 times a day thru 2/27/19  -- Indication: For Bacteremia

## 2019-02-19 DIAGNOSIS — R60.0 LOCALIZED EDEMA: ICD-10-CM

## 2019-02-19 LAB
ANION GAP SERPL CALC-SCNC: 13 MMO/L — SIGNIFICANT CHANGE UP (ref 7–14)
BUN SERPL-MCNC: 26 MG/DL — HIGH (ref 7–23)
CALCIUM SERPL-MCNC: 10.1 MG/DL — SIGNIFICANT CHANGE UP (ref 8.4–10.5)
CHLORIDE SERPL-SCNC: 106 MMOL/L — SIGNIFICANT CHANGE UP (ref 98–107)
CO2 SERPL-SCNC: 23 MMOL/L — SIGNIFICANT CHANGE UP (ref 22–31)
CREAT SERPL-MCNC: 1.13 MG/DL — SIGNIFICANT CHANGE UP (ref 0.5–1.3)
GLUCOSE SERPL-MCNC: 135 MG/DL — HIGH (ref 70–99)
GRAM STAIN BLOOD: SIGNIFICANT CHANGE UP
HCT VFR BLD CALC: 36.2 % — SIGNIFICANT CHANGE UP (ref 34.5–45)
HGB BLD-MCNC: 12.2 G/DL — SIGNIFICANT CHANGE UP (ref 11.5–15.5)
MCHC RBC-ENTMCNC: 33.7 % — SIGNIFICANT CHANGE UP (ref 32–36)
MCHC RBC-ENTMCNC: 35.2 PG — HIGH (ref 27–34)
MCV RBC AUTO: 104.3 FL — HIGH (ref 80–100)
METHOD TYPE: SIGNIFICANT CHANGE UP
NRBC # FLD: 0 K/UL — LOW (ref 25–125)
ORGANISM # SPEC MICROSCOPIC CNT: SIGNIFICANT CHANGE UP
ORGANISM # SPEC MICROSCOPIC CNT: SIGNIFICANT CHANGE UP
PLATELET # BLD AUTO: 213 K/UL — SIGNIFICANT CHANGE UP (ref 150–400)
PMV BLD: 10.7 FL — SIGNIFICANT CHANGE UP (ref 7–13)
POTASSIUM SERPL-MCNC: 3.9 MMOL/L — SIGNIFICANT CHANGE UP (ref 3.5–5.3)
POTASSIUM SERPL-SCNC: 3.9 MMOL/L — SIGNIFICANT CHANGE UP (ref 3.5–5.3)
RBC # BLD: 3.47 M/UL — LOW (ref 3.8–5.2)
RBC # FLD: 12.9 % — SIGNIFICANT CHANGE UP (ref 10.3–14.5)
SODIUM SERPL-SCNC: 142 MMOL/L — SIGNIFICANT CHANGE UP (ref 135–145)
WBC # BLD: 10.39 K/UL — SIGNIFICANT CHANGE UP (ref 3.8–10.5)
WBC # FLD AUTO: 10.39 K/UL — SIGNIFICANT CHANGE UP (ref 3.8–10.5)

## 2019-02-19 PROCEDURE — 93971 EXTREMITY STUDY: CPT | Mod: 26

## 2019-02-19 RX ORDER — ACETAMINOPHEN 500 MG
650 TABLET ORAL EVERY 6 HOURS
Qty: 0 | Refills: 0 | Status: DISCONTINUED | OUTPATIENT
Start: 2019-02-19 | End: 2019-02-21

## 2019-02-19 RX ORDER — GABAPENTIN 400 MG/1
300 CAPSULE ORAL THREE TIMES A DAY
Qty: 0 | Refills: 0 | Status: DISCONTINUED | OUTPATIENT
Start: 2019-02-19 | End: 2019-02-21

## 2019-02-19 RX ADMIN — GABAPENTIN 300 MILLIGRAM(S): 400 CAPSULE ORAL at 13:31

## 2019-02-19 RX ADMIN — HEPARIN SODIUM 5000 UNIT(S): 5000 INJECTION INTRAVENOUS; SUBCUTANEOUS at 06:16

## 2019-02-19 RX ADMIN — LOSARTAN POTASSIUM 75 MILLIGRAM(S): 100 TABLET, FILM COATED ORAL at 06:15

## 2019-02-19 RX ADMIN — Medication 650 MILLIGRAM(S): at 13:50

## 2019-02-19 RX ADMIN — CLOPIDOGREL BISULFATE 75 MILLIGRAM(S): 75 TABLET, FILM COATED ORAL at 12:29

## 2019-02-19 RX ADMIN — HEPARIN SODIUM 5000 UNIT(S): 5000 INJECTION INTRAVENOUS; SUBCUTANEOUS at 22:41

## 2019-02-19 RX ADMIN — Medication 650 MILLIGRAM(S): at 12:49

## 2019-02-19 RX ADMIN — HEPARIN SODIUM 5000 UNIT(S): 5000 INJECTION INTRAVENOUS; SUBCUTANEOUS at 13:31

## 2019-02-19 RX ADMIN — Medication 500 MILLIGRAM(S): at 17:38

## 2019-02-19 RX ADMIN — Medication 1 MILLIGRAM(S): at 22:40

## 2019-02-19 RX ADMIN — Medication 500 MILLIGRAM(S): at 06:15

## 2019-02-19 RX ADMIN — ISOSORBIDE MONONITRATE 60 MILLIGRAM(S): 60 TABLET, EXTENDED RELEASE ORAL at 12:30

## 2019-02-19 NOTE — PROGRESS NOTE ADULT - PROBLEM SELECTOR PLAN 1
- Metabolic 2' sepsis 2' UTI  - now 2' after-effects of anti-psychotic medication  - resume home dosing of neurontin

## 2019-02-19 NOTE — PROGRESS NOTE ADULT - SUBJECTIVE AND OBJECTIVE BOX
More awake today    Vital Signs Last 24 Hrs  T(C): 37 (19 Feb 2019 14:12), Max: 37.6 (18 Feb 2019 22:34)  T(F): 98.6 (19 Feb 2019 14:12), Max: 99.6 (18 Feb 2019 22:34)  HR: 72 (19 Feb 2019 14:12) (53 - 82)  BP: 128/65 (19 Feb 2019 14:12) (128/65 - 161/73)  BP(mean): --  RR: 19 (19 Feb 2019 14:12) (17 - 20)  SpO2: 96% (19 Feb 2019 14:12) (96% - 99%)    GENERAL: More awake today  HEENT: NCAT, EOMI  Neck: supple without JVD  CHEST/LUNG: Clear to auscultation bilaterally; No wheeze, equal breath sounds bilaterally   HEART: Regular rate and rhythm; No murmurs, rubs, or gallops  ABDOMEN: Soft, Nontender, Nondistended; Bowel sounds present, no organomegaly  EXTREMITIES:  2+ Peripheral Pulses, lt UE with 1+ edema but no palpable cord or signs of cellulitis  NEUROLOGY: A+O x 2, nonfocal  SKIN: Normal color, No rashes or lesions    LABS:                        12.2   10.39 )-----------( 213      ( 19 Feb 2019 06:38 )             36.2     02-19    142  |  106  |  26<H>  ----------------------------<  135<H>  3.9   |  23  |  1.13    Ca    10.1      19 Feb 2019 06:38        CAPILLARY BLOOD GLUCOSE

## 2019-02-20 LAB
ANION GAP SERPL CALC-SCNC: 12 MMO/L — SIGNIFICANT CHANGE UP (ref 7–14)
B PERT DNA SPEC QL NAA+PROBE: NOT DETECTED — SIGNIFICANT CHANGE UP
BACTERIA BLD CULT: SIGNIFICANT CHANGE UP
BUN SERPL-MCNC: 32 MG/DL — HIGH (ref 7–23)
C PNEUM DNA SPEC QL NAA+PROBE: NOT DETECTED — SIGNIFICANT CHANGE UP
CALCIUM SERPL-MCNC: 9.2 MG/DL — SIGNIFICANT CHANGE UP (ref 8.4–10.5)
CHLORIDE SERPL-SCNC: 109 MMOL/L — HIGH (ref 98–107)
CO2 SERPL-SCNC: 20 MMOL/L — LOW (ref 22–31)
CREAT SERPL-MCNC: 1.23 MG/DL — SIGNIFICANT CHANGE UP (ref 0.5–1.3)
E COLI DNA BLD POS QL NAA+NON-PROBE: SIGNIFICANT CHANGE UP
FLUAV H1 2009 PAND RNA SPEC QL NAA+PROBE: NOT DETECTED — SIGNIFICANT CHANGE UP
FLUAV H1 RNA SPEC QL NAA+PROBE: NOT DETECTED — SIGNIFICANT CHANGE UP
FLUAV H3 RNA SPEC QL NAA+PROBE: NOT DETECTED — SIGNIFICANT CHANGE UP
FLUAV SUBTYP SPEC NAA+PROBE: NOT DETECTED — SIGNIFICANT CHANGE UP
FLUBV RNA SPEC QL NAA+PROBE: NOT DETECTED — SIGNIFICANT CHANGE UP
GLUCOSE SERPL-MCNC: 108 MG/DL — HIGH (ref 70–99)
HADV DNA SPEC QL NAA+PROBE: NOT DETECTED — SIGNIFICANT CHANGE UP
HCOV PNL SPEC NAA+PROBE: SIGNIFICANT CHANGE UP
HCT VFR BLD CALC: 32.5 % — LOW (ref 34.5–45)
HGB BLD-MCNC: 10.5 G/DL — LOW (ref 11.5–15.5)
HMPV RNA SPEC QL NAA+PROBE: NOT DETECTED — SIGNIFICANT CHANGE UP
HPIV1 RNA SPEC QL NAA+PROBE: NOT DETECTED — SIGNIFICANT CHANGE UP
HPIV2 RNA SPEC QL NAA+PROBE: NOT DETECTED — SIGNIFICANT CHANGE UP
HPIV3 RNA SPEC QL NAA+PROBE: NOT DETECTED — SIGNIFICANT CHANGE UP
HPIV4 RNA SPEC QL NAA+PROBE: NOT DETECTED — SIGNIFICANT CHANGE UP
MCHC RBC-ENTMCNC: 32.3 % — SIGNIFICANT CHANGE UP (ref 32–36)
MCHC RBC-ENTMCNC: 35.5 PG — HIGH (ref 27–34)
MCV RBC AUTO: 109.8 FL — HIGH (ref 80–100)
NRBC # FLD: 0 K/UL — LOW (ref 25–125)
ORGANISM # SPEC MICROSCOPIC CNT: SIGNIFICANT CHANGE UP
PLATELET # BLD AUTO: 218 K/UL — SIGNIFICANT CHANGE UP (ref 150–400)
PMV BLD: 10.6 FL — SIGNIFICANT CHANGE UP (ref 7–13)
POTASSIUM SERPL-MCNC: 3.8 MMOL/L — SIGNIFICANT CHANGE UP (ref 3.5–5.3)
POTASSIUM SERPL-SCNC: 3.8 MMOL/L — SIGNIFICANT CHANGE UP (ref 3.5–5.3)
RBC # BLD: 2.96 M/UL — LOW (ref 3.8–5.2)
RBC # FLD: 13 % — SIGNIFICANT CHANGE UP (ref 10.3–14.5)
RSV RNA SPEC QL NAA+PROBE: NOT DETECTED — SIGNIFICANT CHANGE UP
RV+EV RNA SPEC QL NAA+PROBE: NOT DETECTED — SIGNIFICANT CHANGE UP
SODIUM SERPL-SCNC: 141 MMOL/L — SIGNIFICANT CHANGE UP (ref 135–145)
WBC # BLD: 7.83 K/UL — SIGNIFICANT CHANGE UP (ref 3.8–10.5)
WBC # FLD AUTO: 7.83 K/UL — SIGNIFICANT CHANGE UP (ref 3.8–10.5)

## 2019-02-20 PROCEDURE — 71045 X-RAY EXAM CHEST 1 VIEW: CPT | Mod: 26

## 2019-02-20 RX ORDER — SODIUM CHLORIDE 9 MG/ML
1000 INJECTION INTRAMUSCULAR; INTRAVENOUS; SUBCUTANEOUS ONCE
Qty: 0 | Refills: 0 | Status: COMPLETED | OUTPATIENT
Start: 2019-02-20 | End: 2019-02-20

## 2019-02-20 RX ADMIN — GABAPENTIN 300 MILLIGRAM(S): 400 CAPSULE ORAL at 22:13

## 2019-02-20 RX ADMIN — Medication 650 MILLIGRAM(S): at 01:11

## 2019-02-20 RX ADMIN — ISOSORBIDE MONONITRATE 60 MILLIGRAM(S): 60 TABLET, EXTENDED RELEASE ORAL at 12:52

## 2019-02-20 RX ADMIN — Medication 650 MILLIGRAM(S): at 02:34

## 2019-02-20 RX ADMIN — GABAPENTIN 300 MILLIGRAM(S): 400 CAPSULE ORAL at 07:02

## 2019-02-20 RX ADMIN — HEPARIN SODIUM 5000 UNIT(S): 5000 INJECTION INTRAVENOUS; SUBCUTANEOUS at 13:56

## 2019-02-20 RX ADMIN — HEPARIN SODIUM 5000 UNIT(S): 5000 INJECTION INTRAVENOUS; SUBCUTANEOUS at 22:13

## 2019-02-20 RX ADMIN — GABAPENTIN 300 MILLIGRAM(S): 400 CAPSULE ORAL at 13:56

## 2019-02-20 RX ADMIN — Medication 500 MILLIGRAM(S): at 07:02

## 2019-02-20 RX ADMIN — Medication 500 MILLIGRAM(S): at 17:53

## 2019-02-20 RX ADMIN — Medication 1 MILLIGRAM(S): at 22:13

## 2019-02-20 RX ADMIN — LOSARTAN POTASSIUM 75 MILLIGRAM(S): 100 TABLET, FILM COATED ORAL at 07:02

## 2019-02-20 RX ADMIN — CLOPIDOGREL BISULFATE 75 MILLIGRAM(S): 75 TABLET, FILM COATED ORAL at 12:51

## 2019-02-20 RX ADMIN — SODIUM CHLORIDE 1000 MILLILITER(S): 9 INJECTION INTRAMUSCULAR; INTRAVENOUS; SUBCUTANEOUS at 01:16

## 2019-02-20 RX ADMIN — HEPARIN SODIUM 5000 UNIT(S): 5000 INJECTION INTRAVENOUS; SUBCUTANEOUS at 07:00

## 2019-02-20 NOTE — PROGRESS NOTE ADULT - PROBLEM SELECTOR PLAN 4
- In setting of UTI  - Low suspicion for primary CNS process  - Continue antibiotics. Monitor clinically
- In setting of infection and encephalopathy. No LOC or head trauma per . CTH and x-rays neg  - Fall precaution  - PT consult in am
- Monitor BP  - Continue home meds
Check lt UE venous doppler  If negative, lt UE elevation above chest while sleeping
lt UE venous doppler (-) for DVT  lt UE elevation above chest while sleeping

## 2019-02-20 NOTE — PROGRESS NOTE ADULT - PROBLEM SELECTOR PLAN 2
will follow
- Continue ceftriaxone  - E coli detected in blood PCR  - 1st repeat blood cx growing GNR as well  - 2nd repeat blood cxs testing  - renal U/S showing nonobstructing rt kidney stone  - appreciate ID
will follow
- Continue amoxicillin , last day is 2/27  - E coli detected in blood PCR  - 1st repeat blood cx grew GNR as well  - 2nd repeat blood cxs 2/16 NTD  - renal U/S showing nonobstructing rt kidney stone  - appreciate ID
- Continue ceftriaxone  - E coli detected in blood PCR  - 1st repeat blood cx growing GNR as well  - 2nd repeat blood cxs ordered  - if the renal / bladder U/S is negative, may still need abd / pelvic CT with IV contrast to rule out active GI infection  - appreciate ID
- In setting of infection and encephalopathy. No LOC or head trauma per . CTH and x-rays neg  - Fall precaution  - PT consult in am
2' GNR bacteremia 2' UTI  f/u repeat blood culture from 2/16; so far still negative  resolved
2' GNR bacteremia 2' UTI  repeat blood culture from 2/16 are so far still negative  f/u blood bcx from 2/19  resolved
2' GNR bacteremia 2' UTI  repeat blood culture from 2/16 are so far still negative  resolved
seems confused, unclear baseline
seems confused, unclear baseline

## 2019-02-20 NOTE — PROGRESS NOTE ADULT - PROBLEM SELECTOR PLAN 7
SC heparin
- Monitor BP  - Continue home meds
- Monitor BP  - Continue home meds
SC heparin

## 2019-02-20 NOTE — PROGRESS NOTE ADULT - PROBLEM SELECTOR PLAN 6
- Monitor BP  - Continue home meds
prerenal 2' sepsis  s/p 12 hours of IVF NS 2/16  resolved
prerenal 2' sepsis  s/p 12 hours of IVF NS 2/16  resolved

## 2019-02-20 NOTE — PROGRESS NOTE ADULT - PROBLEM SELECTOR PROBLEM 7
Need for prophylactic measure
Essential hypertension
Essential hypertension
Need for prophylactic measure

## 2019-02-20 NOTE — PROGRESS NOTE ADULT - PROBLEM SELECTOR PROBLEM 4
Encephalopathy
Essential hypertension
Extremity edema
Extremity edema
Fall, initial encounter

## 2019-02-20 NOTE — PROGRESS NOTE ADULT - ASSESSMENT
87f with htn, tia, gerd, admitted with encephalopathy, fall  found to have fever and pyuria  Ecoli bacteremia suspected due to pyelonephritis
86 yo F with h/o HTN, TIA, prior GI and retroperitoneal bleeds brought to ED by  for confusion and weakness. Found to have UTI
86 yo F with h/o HTN, TIA, prior GI and retroperitoneal bleeds brought to ED by  for confusion and weakness. Found to have UTI
87f with htn, tia, gerd, admitted with encephalopathy, fall  found to have fever and pyuria  Ecoli bacteremia suspected due to pyelonephritis
87f with htn, tia, gerd, admitted with encephalopathy, fall  found to have fever and pyuria  Ecoli bacteremia suspected due to pyelonephritis
88 yo F with h/o HTN, TIA, prior GI and retroperitoneal bleeds brought to ED by  for confusion and weakness. Found to have UTI
87f with htn, tia, gerd, admitted with encephalopathy, fall  found to have fever and pyuria  Ecoli bacteremia suspected due to pyelonephritis
86 yo F with h/o HTN, TIA, prior GI and retroperitoneal bleeds brought to ED by  for confusion and weakness. Found to have UTI

## 2019-02-20 NOTE — PROGRESS NOTE ADULT - PROBLEM SELECTOR PLAN 3
per primary team
- In setting of infection and encephalopathy. No LOC or head trauma per . CTH and x-rays neg  - Fall precaution  - PT consult in am
per primary team
- Continue amoxicillin , last day is 2/27  - E coli detected in blood PCR  - 1st repeat blood cx grew GNR as well  - 2nd repeat blood cxs 2/16 NTD  - f/u repeat blood cx from 2/19  - renal U/S showing nonobstructing rt kidney stone  - appreciate ID
- Continue amoxicillin , last day is 2/27  - E coli detected in blood PCR  - 1st repeat blood cx grew GNR as well  - 2nd repeat blood cxs 2/16 NTD  - renal U/S showing nonobstructing rt kidney stone  - appreciate ID
- Continue amoxicillin , last day is 2/27  - E coli detected in blood PCR  - 1st repeat blood cx grew GNR as well  - 2nd repeat blood cxs 2/16 NTD  - renal U/S showing nonobstructing rt kidney stone  - appreciate ID
- In setting of UTI  - Low suspicion for primary CNS process  - Continue antibiotics. Monitor clinically
- In setting of infection and encephalopathy. No LOC or head trauma per . CTH and x-rays neg  - Fall precaution  - PT consult in am
- In setting of infection and encephalopathy. No LOC or head trauma per . CTH and x-rays neg  - Fall precaution  - PT consult in am
per primary team
per primary team    Thank you for consulting us and involving us in the management of this most interesting and challenging case.     Please Call with any further questions
Passed

## 2019-02-20 NOTE — PROGRESS NOTE ADULT - PROBLEM SELECTOR PROBLEM 5
CELI (acute kidney injury)
Fall, initial encounter
Fall, initial encounter
Medication management

## 2019-02-20 NOTE — PROGRESS NOTE ADULT - PROBLEM SELECTOR PROBLEM 1
Sepsis
Bacteremia
Encephalopathy
Sepsis
Sepsis
Urinary tract infection without hematuria, site unspecified
Bacteremia

## 2019-02-20 NOTE — PROGRESS NOTE ADULT - PROBLEM SELECTOR PROBLEM 6
Essential hypertension
CELI (acute kidney injury)
CELI (acute kidney injury)
Essential hypertension

## 2019-02-20 NOTE — PROGRESS NOTE ADULT - REASON FOR ADMISSION
UTI/ encephalopathy

## 2019-02-20 NOTE — PROGRESS NOTE ADULT - PROBLEM SELECTOR PROBLEM 3
Essential hypertension
Fall, initial encounter
Essential hypertension
Encephalopathy
Essential hypertension
Essential hypertension
Fall, initial encounter
Fall, initial encounter
Urinary tract infection without hematuria, site unspecified

## 2019-02-20 NOTE — PROGRESS NOTE ADULT - PROBLEM SELECTOR PLAN 5
prerenal 2' sepsis  s/p 12 hours of IVF NS today
- In setting of infection and encephalopathy. No LOC or head trauma per . CTH and x-rays neg  - Fall precaution  - PT consult appreciated
- In setting of infection and encephalopathy. No LOC or head trauma per . CTH and x-rays neg  - Fall precaution  - PT consult in am
Consulted pharmacist for med rec
prerenal 2' sepsis  s/p 12 hours of IVF NS 2/16  resolved
prerenal 2' sepsis  s/p 12 hours of IVF NS 2/16  resolved
prerenal 2' sepsis  s/p 12 hours of IVF NS today

## 2019-02-20 NOTE — PROGRESS NOTE ADULT - SUBJECTIVE AND OBJECTIVE BOX
100.4 temp last night, was pancultured    Vital Signs Last 24 Hrs  T(C): 36.9 (20 Feb 2019 17:18), Max: 38 (20 Feb 2019 01:11)  T(F): 98.5 (20 Feb 2019 17:18), Max: 100.4 (20 Feb 2019 01:11)  HR: 92 (20 Feb 2019 17:18) (61 - 125)  BP: 143/82 (20 Feb 2019 17:18) (137/83 - 162/84)  BP(mean): --  RR: 17 (20 Feb 2019 17:18) (17 - 19)  SpO2: 97% (20 Feb 2019 17:18) (95% - 100%)    GENERAL: More awake today  HEENT: NCAT, EOMI  Neck: supple without JVD  CHEST/LUNG: Clear to auscultation bilaterally; No wheeze, equal breath sounds bilaterally   HEART: Regular rate and rhythm; No murmurs, rubs, or gallops  ABDOMEN: Soft, Nontender, Nondistended; Bowel sounds present, no organomegaly  EXTREMITIES:  2+ Peripheral Pulses, lt UE with 1+ edema but no palpable cord or signs of cellulitis  NEUROLOGY: A+O x 2, nonfocal  SKIN: Normal color, No rashes or lesions    LABS:                        10.5   7.83  )-----------( 218      ( 20 Feb 2019 05:20 )             32.5     02-20    141  |  109<H>  |  32<H>  ----------------------------<  108<H>  3.8   |  20<L>  |  1.23    Ca    9.2      20 Feb 2019 05:20        CAPILLARY BLOOD GLUCOSE

## 2019-02-20 NOTE — PROGRESS NOTE ADULT - PROBLEM SELECTOR PLAN 1
- Metabolic 2' sepsis 2' UTI  - now 2' after-effects of anti-psychotic medication  - Continue home dosing of neurontin

## 2019-02-20 NOTE — CHART NOTE - NSCHARTNOTEFT_GEN_A_CORE
Notified by RN patient tachycardic to 120s.   Rectal temp 100.4 --- Repeat CXR, Blood Cx x 2, IV bolus, and RVP Notified by RN patient tachycardic to 120s.   Rectal temp 100.4 --- Repeat CXR, Blood Cx x 2, IV bolus, and RVP pending.   Pt on PO Augmentin for bacteremia.   Will continue to monitor    - ADS MARSHALL Rivers

## 2019-02-20 NOTE — PROGRESS NOTE ADULT - PROBLEM SELECTOR PROBLEM 2
Urinary tract infection without hematuria, site unspecified
Encephalopathy
Fall, initial encounter
Sepsis
Urinary tract infection without hematuria, site unspecified
Urinary tract infection without hematuria, site unspecified
Encephalopathy

## 2019-02-21 ENCOUNTER — FORM ENCOUNTER (OUTPATIENT)
Age: 84
End: 2019-02-21

## 2019-02-21 VITALS — DIASTOLIC BLOOD PRESSURE: 81 MMHG | SYSTOLIC BLOOD PRESSURE: 162 MMHG

## 2019-02-21 LAB
ANION GAP SERPL CALC-SCNC: 10 MMO/L — SIGNIFICANT CHANGE UP (ref 7–14)
BACTERIA BLD CULT: SIGNIFICANT CHANGE UP
BACTERIA BLD CULT: SIGNIFICANT CHANGE UP
BUN SERPL-MCNC: 34 MG/DL — HIGH (ref 7–23)
CALCIUM SERPL-MCNC: 9.9 MG/DL — SIGNIFICANT CHANGE UP (ref 8.4–10.5)
CHLORIDE SERPL-SCNC: 107 MMOL/L — SIGNIFICANT CHANGE UP (ref 98–107)
CO2 SERPL-SCNC: 23 MMOL/L — SIGNIFICANT CHANGE UP (ref 22–31)
CREAT SERPL-MCNC: 1.17 MG/DL — SIGNIFICANT CHANGE UP (ref 0.5–1.3)
GLUCOSE SERPL-MCNC: 113 MG/DL — HIGH (ref 70–99)
HCT VFR BLD CALC: 31.3 % — LOW (ref 34.5–45)
HGB BLD-MCNC: 10.4 G/DL — LOW (ref 11.5–15.5)
MAGNESIUM SERPL-MCNC: 2.4 MG/DL — SIGNIFICANT CHANGE UP (ref 1.6–2.6)
MCHC RBC-ENTMCNC: 33.2 % — SIGNIFICANT CHANGE UP (ref 32–36)
MCHC RBC-ENTMCNC: 34.9 PG — HIGH (ref 27–34)
MCV RBC AUTO: 105 FL — HIGH (ref 80–100)
NRBC # FLD: 0 K/UL — LOW (ref 25–125)
PHOSPHATE SERPL-MCNC: 3.1 MG/DL — SIGNIFICANT CHANGE UP (ref 2.5–4.5)
PLATELET # BLD AUTO: 261 K/UL — SIGNIFICANT CHANGE UP (ref 150–400)
PMV BLD: 10.5 FL — SIGNIFICANT CHANGE UP (ref 7–13)
POTASSIUM SERPL-MCNC: 4 MMOL/L — SIGNIFICANT CHANGE UP (ref 3.5–5.3)
POTASSIUM SERPL-SCNC: 4 MMOL/L — SIGNIFICANT CHANGE UP (ref 3.5–5.3)
RBC # BLD: 2.98 M/UL — LOW (ref 3.8–5.2)
RBC # FLD: 13 % — SIGNIFICANT CHANGE UP (ref 10.3–14.5)
SODIUM SERPL-SCNC: 140 MMOL/L — SIGNIFICANT CHANGE UP (ref 135–145)
SPECIMEN SOURCE: SIGNIFICANT CHANGE UP
SPECIMEN SOURCE: SIGNIFICANT CHANGE UP
WBC # BLD: 7.1 K/UL — SIGNIFICANT CHANGE UP (ref 3.8–10.5)
WBC # FLD AUTO: 7.1 K/UL — SIGNIFICANT CHANGE UP (ref 3.8–10.5)

## 2019-02-21 RX ORDER — CELECOXIB 200 MG/1
1 CAPSULE ORAL
Qty: 0 | Refills: 0 | COMMUNITY

## 2019-02-21 RX ORDER — AMOXICILLIN 250 MG/5ML
1 SUSPENSION, RECONSTITUTED, ORAL (ML) ORAL
Qty: 0 | Refills: 0 | COMMUNITY
Start: 2019-02-21

## 2019-02-21 RX ORDER — LOSARTAN POTASSIUM 100 MG/1
3 TABLET, FILM COATED ORAL
Qty: 0 | Refills: 0 | COMMUNITY
Start: 2019-02-21

## 2019-02-21 RX ORDER — ACETAMINOPHEN 500 MG
2 TABLET ORAL
Qty: 0 | Refills: 0 | COMMUNITY
Start: 2019-02-21

## 2019-02-21 RX ORDER — FUROSEMIDE 40 MG
1 TABLET ORAL
Qty: 0 | Refills: 0 | COMMUNITY

## 2019-02-21 RX ADMIN — GABAPENTIN 300 MILLIGRAM(S): 400 CAPSULE ORAL at 06:09

## 2019-02-21 RX ADMIN — GABAPENTIN 300 MILLIGRAM(S): 400 CAPSULE ORAL at 14:25

## 2019-02-21 RX ADMIN — ISOSORBIDE MONONITRATE 60 MILLIGRAM(S): 60 TABLET, EXTENDED RELEASE ORAL at 12:13

## 2019-02-21 RX ADMIN — Medication 650 MILLIGRAM(S): at 10:04

## 2019-02-21 RX ADMIN — CLOPIDOGREL BISULFATE 75 MILLIGRAM(S): 75 TABLET, FILM COATED ORAL at 12:13

## 2019-02-21 RX ADMIN — HEPARIN SODIUM 5000 UNIT(S): 5000 INJECTION INTRAVENOUS; SUBCUTANEOUS at 14:25

## 2019-02-21 RX ADMIN — HEPARIN SODIUM 5000 UNIT(S): 5000 INJECTION INTRAVENOUS; SUBCUTANEOUS at 06:09

## 2019-02-21 RX ADMIN — Medication 500 MILLIGRAM(S): at 06:08

## 2019-02-21 RX ADMIN — LOSARTAN POTASSIUM 75 MILLIGRAM(S): 100 TABLET, FILM COATED ORAL at 06:08

## 2019-02-22 ENCOUNTER — OUTPATIENT (OUTPATIENT)
Dept: OUTPATIENT SERVICES | Facility: HOSPITAL | Age: 84
LOS: 1 days | End: 2019-02-22
Payer: COMMERCIAL

## 2019-02-22 DIAGNOSIS — I82.401 ACUTE EMBOLISM AND THROMBOSIS OF UNSPECIFIED DEEP VEINS OF RIGHT LOWER EXTREMITY: ICD-10-CM

## 2019-02-22 DIAGNOSIS — Z98.89 OTHER SPECIFIED POSTPROCEDURAL STATES: Chronic | ICD-10-CM

## 2019-02-22 LAB
BACTERIA BLD CULT: SIGNIFICANT CHANGE UP
METHYLMALONATE SERPL-SCNC: 226 NMOL/L — SIGNIFICANT CHANGE UP (ref 0–378)

## 2019-02-22 PROCEDURE — 93971 EXTREMITY STUDY: CPT | Mod: 26

## 2019-02-22 PROCEDURE — 93971 EXTREMITY STUDY: CPT

## 2019-02-24 ENCOUNTER — FORM ENCOUNTER (OUTPATIENT)
Age: 84
End: 2019-02-24

## 2019-02-25 LAB
BACTERIA BLD CULT: SIGNIFICANT CHANGE UP
BACTERIA BLD CULT: SIGNIFICANT CHANGE UP

## 2019-03-03 ENCOUNTER — EMERGENCY (EMERGENCY)
Facility: HOSPITAL | Age: 84
LOS: 1 days | Discharge: HOME | End: 2019-03-03
Attending: EMERGENCY MEDICINE
Payer: COMMERCIAL

## 2019-03-03 VITALS
OXYGEN SATURATION: 94 % | TEMPERATURE: 98 F | HEART RATE: 74 BPM | SYSTOLIC BLOOD PRESSURE: 152 MMHG | DIASTOLIC BLOOD PRESSURE: 75 MMHG | RESPIRATION RATE: 19 BRPM

## 2019-03-03 DIAGNOSIS — Z98.89 OTHER SPECIFIED POSTPROCEDURAL STATES: Chronic | ICD-10-CM

## 2019-03-03 LAB
ALBUMIN SERPL ELPH-MCNC: 3 G/DL — LOW (ref 3.3–5)
ALP SERPL-CCNC: 67 U/L — SIGNIFICANT CHANGE UP (ref 40–120)
ALT FLD-CCNC: 13 U/L — SIGNIFICANT CHANGE UP (ref 10–45)
ANION GAP SERPL CALC-SCNC: 13 MMOL/L — SIGNIFICANT CHANGE UP (ref 5–17)
APPEARANCE UR: CLEAR — SIGNIFICANT CHANGE UP
APTT BLD: 27.2 SEC — LOW (ref 27.5–36.3)
AST SERPL-CCNC: 14 U/L — SIGNIFICANT CHANGE UP (ref 10–40)
BASOPHILS # BLD AUTO: 0.1 K/UL — SIGNIFICANT CHANGE UP (ref 0–0.2)
BASOPHILS NFR BLD AUTO: 0.6 % — SIGNIFICANT CHANGE UP (ref 0–2)
BILIRUB SERPL-MCNC: 0.1 MG/DL — LOW (ref 0.2–1.2)
BILIRUB UR-MCNC: NEGATIVE — SIGNIFICANT CHANGE UP
BUN SERPL-MCNC: 28 MG/DL — HIGH (ref 7–23)
CALCIUM SERPL-MCNC: 10.4 MG/DL — SIGNIFICANT CHANGE UP (ref 8.4–10.5)
CHLORIDE SERPL-SCNC: 104 MMOL/L — SIGNIFICANT CHANGE UP (ref 96–108)
CO2 SERPL-SCNC: 22 MMOL/L — SIGNIFICANT CHANGE UP (ref 22–31)
COLOR SPEC: SIGNIFICANT CHANGE UP
CREAT SERPL-MCNC: 1.11 MG/DL — SIGNIFICANT CHANGE UP (ref 0.5–1.3)
DIFF PNL FLD: NEGATIVE — SIGNIFICANT CHANGE UP
EOSINOPHIL # BLD AUTO: 0.2 K/UL — SIGNIFICANT CHANGE UP (ref 0–0.5)
EOSINOPHIL NFR BLD AUTO: 2.7 % — SIGNIFICANT CHANGE UP (ref 0–6)
GAS PNL BLDV: SIGNIFICANT CHANGE UP
GLUCOSE SERPL-MCNC: 107 MG/DL — HIGH (ref 70–99)
GLUCOSE UR QL: NEGATIVE — SIGNIFICANT CHANGE UP
HCT VFR BLD CALC: 32.4 % — LOW (ref 34.5–45)
HGB BLD-MCNC: 11.2 G/DL — LOW (ref 11.5–15.5)
INR BLD: 1.09 RATIO — SIGNIFICANT CHANGE UP (ref 0.88–1.16)
KETONES UR-MCNC: NEGATIVE — SIGNIFICANT CHANGE UP
LEUKOCYTE ESTERASE UR-ACNC: NEGATIVE — SIGNIFICANT CHANGE UP
LYMPHOCYTES # BLD AUTO: 1.2 K/UL — SIGNIFICANT CHANGE UP (ref 1–3.3)
LYMPHOCYTES # BLD AUTO: 14.4 % — SIGNIFICANT CHANGE UP (ref 13–44)
MCHC RBC-ENTMCNC: 34.7 GM/DL — SIGNIFICANT CHANGE UP (ref 32–36)
MCHC RBC-ENTMCNC: 36.2 PG — HIGH (ref 27–34)
MCV RBC AUTO: 104 FL — HIGH (ref 80–100)
MONOCYTES # BLD AUTO: 0.7 K/UL — SIGNIFICANT CHANGE UP (ref 0–0.9)
MONOCYTES NFR BLD AUTO: 8.8 % — SIGNIFICANT CHANGE UP (ref 2–14)
NEUTROPHILS # BLD AUTO: 5.9 K/UL — SIGNIFICANT CHANGE UP (ref 1.8–7.4)
NEUTROPHILS NFR BLD AUTO: 73.4 % — SIGNIFICANT CHANGE UP (ref 43–77)
NITRITE UR-MCNC: NEGATIVE — SIGNIFICANT CHANGE UP
PH UR: 6.5 — SIGNIFICANT CHANGE UP (ref 5–8)
PLATELET # BLD AUTO: 363 K/UL — SIGNIFICANT CHANGE UP (ref 150–400)
POTASSIUM SERPL-MCNC: 4.1 MMOL/L — SIGNIFICANT CHANGE UP (ref 3.5–5.3)
POTASSIUM SERPL-SCNC: 4.1 MMOL/L — SIGNIFICANT CHANGE UP (ref 3.5–5.3)
PROT SERPL-MCNC: 6.2 G/DL — SIGNIFICANT CHANGE UP (ref 6–8.3)
PROT UR-MCNC: ABNORMAL
PROTHROM AB SERPL-ACNC: 12.6 SEC — SIGNIFICANT CHANGE UP (ref 10–12.9)
RAPID RVP RESULT: SIGNIFICANT CHANGE UP
RBC # BLD: 3.1 M/UL — LOW (ref 3.8–5.2)
RBC # FLD: 11.9 % — SIGNIFICANT CHANGE UP (ref 10.3–14.5)
SODIUM SERPL-SCNC: 139 MMOL/L — SIGNIFICANT CHANGE UP (ref 135–145)
SP GR SPEC: 1.02 — SIGNIFICANT CHANGE UP (ref 1.01–1.02)
UROBILINOGEN FLD QL: NEGATIVE — SIGNIFICANT CHANGE UP
WBC # BLD: 8.1 K/UL — SIGNIFICANT CHANGE UP (ref 3.8–10.5)
WBC # FLD AUTO: 8.1 K/UL — SIGNIFICANT CHANGE UP (ref 3.8–10.5)

## 2019-03-03 PROCEDURE — 70450 CT HEAD/BRAIN W/O DYE: CPT | Mod: 26

## 2019-03-03 PROCEDURE — 99284 EMERGENCY DEPT VISIT MOD MDM: CPT

## 2019-03-03 PROCEDURE — 71045 X-RAY EXAM CHEST 1 VIEW: CPT | Mod: 26

## 2019-03-03 RX ORDER — VANCOMYCIN HCL 1 G
1000 VIAL (EA) INTRAVENOUS ONCE
Qty: 0 | Refills: 0 | Status: COMPLETED | OUTPATIENT
Start: 2019-03-03 | End: 2019-03-03

## 2019-03-03 RX ORDER — SODIUM CHLORIDE 9 MG/ML
1000 INJECTION INTRAMUSCULAR; INTRAVENOUS; SUBCUTANEOUS ONCE
Qty: 0 | Refills: 0 | Status: COMPLETED | OUTPATIENT
Start: 2019-03-03 | End: 2019-03-03

## 2019-03-03 RX ORDER — PIPERACILLIN AND TAZOBACTAM 4; .5 G/20ML; G/20ML
3.38 INJECTION, POWDER, LYOPHILIZED, FOR SOLUTION INTRAVENOUS ONCE
Qty: 0 | Refills: 0 | Status: COMPLETED | OUTPATIENT
Start: 2019-03-03 | End: 2019-03-03

## 2019-03-03 RX ADMIN — PIPERACILLIN AND TAZOBACTAM 3.38 GRAM(S): 4; .5 INJECTION, POWDER, LYOPHILIZED, FOR SOLUTION INTRAVENOUS at 20:00

## 2019-03-03 RX ADMIN — PIPERACILLIN AND TAZOBACTAM 200 GRAM(S): 4; .5 INJECTION, POWDER, LYOPHILIZED, FOR SOLUTION INTRAVENOUS at 19:13

## 2019-03-03 RX ADMIN — Medication 250 MILLIGRAM(S): at 20:52

## 2019-03-03 RX ADMIN — SODIUM CHLORIDE 1000 MILLILITER(S): 9 INJECTION INTRAMUSCULAR; INTRAVENOUS; SUBCUTANEOUS at 19:13

## 2019-03-03 RX ADMIN — SODIUM CHLORIDE 1000 MILLILITER(S): 9 INJECTION INTRAMUSCULAR; INTRAVENOUS; SUBCUTANEOUS at 21:00

## 2019-03-03 RX ADMIN — Medication 1000 MILLIGRAM(S): at 22:10

## 2019-03-03 NOTE — ED ADULT NURSE NOTE - NS_ED_NURSE_TEACHING_TOPIC_ED_A_ED
all d/c instructions printed and given to senior care staff for  [facility] rehab. DR FAN france spoke with attending MD and RN at [facility]

## 2019-03-03 NOTE — ED ADULT NURSE REASSESSMENT NOTE - STATUS
[facility]

## 2019-03-03 NOTE — ED PROVIDER NOTE - PHYSICAL EXAMINATION
Gen: NAD, AOx2  Head: NCAT  HEENT: PERRL, oral mucosa moist, normal conjunctiva  Lung: CTAB, no respiratory distress  CV: rrr, no murmurs, Normal perfusion  Abd: soft, NTND, no CVA tenderness  MSK: No edema, no visible deformities  Neuro: No focal neurologic deficits  Skin: No rash   Psych: normal affect Gen: AOx2, unable to follow commands, patient tells me she "lives in the ceiling"  Head: NCAT  HEENT: PERRL, oral mucosa dry with cracked lips, normal conjunctiva  Lung: CTAB, no respiratory distress  CV: bradycardic, no murmurs, Normal perfusion  Abd: soft, nondistended, slight bruising at site of lovenox injections  MSK: No edema, no visible deformities  Neuro: No focal neurologic deficits, patient moving all extremities spontaneously, clear speech, will not follow commands for full neuro exam  Skin: blanching erythema to buttock Gen: AOx2, unable to follow commands, patient tells me she "lives in the ceiling"  Head: NCAT  HEENT: PERRL, oral mucosa dry with cracked lips, normal conjunctiva  Lung: CTAB, no respiratory distress  CV: bradycardic, no murmurs, Normal perfusion  Abd: soft, nondistended, slight bruising at site of lovenox injections  MSK: No edema, no visible deformities  Neuro: No focal neurologic deficits, patient moving all extremities spontaneously, clear speech, will not follow commands for full neuro exam  Skin: blanching erythema to buttock  Attending Angela Schultz: Gen: NAD, heent: atrauamtic, eomi, perrla, mmm, op pink, uvula midline, neck; nttp, no nuchal rigidity, chest: nttp, no crepitus, cv: rrr, no murmurs, lungs: ctab, abd: soft, nontender, nondistended, no peritoneal signs, +BS, no guarding, ext: wwp, neg homans, skin: area bruising to lower abdomen, neuro: awake and alert, following commands, speech clear, sensation and strength intact, no focal deficits

## 2019-03-03 NOTE — ED PROVIDER NOTE - ATTENDING CONTRIBUTION TO CARE
Attending MD Angela Schultz:  I personally have seen and examined this patient.  Resident note reviewed and agree on plan of care and except where noted.  See HPI, PE, and MDM for details.

## 2019-03-03 NOTE — ED ADULT NURSE NOTE - OBJECTIVE STATEMENT
86 y/o F, PMH HTN, TIA on plavix, PVD, GIB, angina, recently admitted for UTI and frequent falls BIBA from Nursing Home for AMS for unknown amount of time. EMS reports Nursing home staff was unsure how long pt has been altered but that pt's baseline is AAOx2, disoriented to time. Pt currently AAOx2 at baseline mental status with confusion. Pt c/o pain all over. Mj independently. No cough, SOB, v/d, abd tenderness noted. Blanchable redness noted to sacrum, skin intact, rectal temp 100.2F MD aware, Pt straight cathed using sterile technique. 2 RNs present to confirm sterility. Pt tolerated procedure well. Sterile specimen collected. UA and Culture sent as ordered. Approx 600 cc yellow urine output noted to bag. Pt cleaned, diaper changed, linens and gown changed. Safety maintained.

## 2019-03-03 NOTE — ED PROVIDER NOTE - CLINICAL SUMMARY MEDICAL DECISION MAKING FREE TEXT BOX
87F sent from [facility] for AMS.  Pt febrile and and confused, not following commands.  Given recent admission for bacteremia, will obtain sepsis labs, cultures, cxr, ekg, give fluids, abx and plan for admission.  - Aniyah Chappell DO 87F [place] [facility].  Pt febrile and and confused, not following commands.  Given recent admission for bacteremia, will obtain sepsis labs, cultures, cxr, ekg, give fluids, abx and plan for admission.  - Aniyah Chappell DO  Attending Angela Schultz: 87 y'o female with multiple medical issues including recent admission for bacteremia presnting with concern for AMS. upon arrival pt awake and following commands. oriented to name. called facility and pt reportedly at her baseline. upon arrival rectal temp of 100.4. pt cultured. abdomen soft and nontender. full infectious work up without clear etiology of fever. serial abdomianl exams unchanged. no reported mental status changes or neck stiffness to suggest meningitis. less likely hepatic encephalopathy. will try to discuss with family, unclear need for abx as no obvious source of infection. will monitor temperature. d/w family for disp

## 2019-03-03 NOTE — ED ADULT NURSE NOTE - NSIMPLEMENTINTERV_GEN_ALL_ED
Implemented All Fall with Harm Risk Interventions:  [place] to call system. Call bell, personal items and telephone within reach. Instruct patient to call for assistance. Room bathroom lighting operational. Non-slip footwear when patient is off stretcher. Physically safe environment: no spills, clutter or unnecessary equipment. Stretcher in lowest position, wheels locked, appropriate side rails in place. Provide visual cue, wrist band, yellow gown, etc. Monitor gait and stability. Monitor for mental status changes and reorient to person, place, and time. Review medications for side effects contributing to fall risk. Reinforce activity limits and safety measures with patient and family. Provide visual clues: red socks.

## 2019-03-03 NOTE — ED PROVIDER NOTE - PROGRESS NOTE DETAILS
Richa Gonzalez, DO: Called nurse manage at [facility] who is unable to identify if patient would be sent back if patient spiked fever. Geriatric fellow paged, pending callback. Richa Gonzalez, DO: Patient signed out to me by Dr. Chappell pending infectious workup and CT. Workup unremarkable. Patient AAOx1 at baseline. Calm and cooperative and afebrile. Called nurse manage at [facility] who is unable to identify if patient would be sent back if patient spiked fever. Geriatric fellow paged, pending callback. Richa Gonzalez DO: D/w geriatric fellow on call, aware of w/u. If bed available, patient stable for discharge home. Will current would not recommend admission, geriatric fellow aware. Richa Gonzalez, DO: Happy, nursing supervisor, notified of plan to discharge back. Discussed plan with Dr. Betancourt ([phone]) who stated that patient was sent in for hypoactive delerium at family's request for further workup. Work-up at this time up-revealing. Will discharge home as patient reportedly at baseline. Dr. Betancourt aware. Richa Gonzalez DO: Patient previously discharged. EMS dispatcher unwilling to transport patient as paitent now hypertensive with BP now 212/90s. EMS dispatcher 2.5 hours late. Patient's /75 on arrival. Gave 25 mg of home dose Losartan on 3/21/2019 not originally listed in [facility] Chart. EMS to return in 1 hour.

## 2019-03-03 NOTE — ED PROVIDER NOTE - NSFOLLOWUPINSTRUCTIONS_ED_ALL_ED_FT
1. Please follow up with Dr. Betancourt as scheduled.   2. Please return to the ED should you have any new or worsening symptoms or have any new concerns.   3. Read all attached.

## 2019-03-03 NOTE — ED PROVIDER NOTE - OBJECTIVE STATEMENT
87F pmh HTN, falls, recent admission for uti, bacteremia presents from [facility] rehab for altered mental status.  Pt is A&O x 2 at baseline.  Noted to be altered, not following commands and generally weaker today so she was sent to ER.  Pt remains A&O x 2 but cannot follow commands.  Pt unable to contribute to history.

## 2019-03-04 VITALS
HEART RATE: 71 BPM | TEMPERATURE: 98 F | SYSTOLIC BLOOD PRESSURE: 177 MMHG | RESPIRATION RATE: 20 BRPM | OXYGEN SATURATION: 96 % | DIASTOLIC BLOOD PRESSURE: 76 MMHG

## 2019-03-04 LAB
CULTURE RESULTS: NO GROWTH — SIGNIFICANT CHANGE UP
SPECIMEN SOURCE: SIGNIFICANT CHANGE UP

## 2019-03-04 PROCEDURE — 96367 TX/PROPH/DG ADDL SEQ IV INF: CPT

## 2019-03-04 PROCEDURE — 87486 CHLMYD PNEUM DNA AMP PROBE: CPT

## 2019-03-04 PROCEDURE — 85014 HEMATOCRIT: CPT

## 2019-03-04 PROCEDURE — 87633 RESP VIRUS 12-25 TARGETS: CPT

## 2019-03-04 PROCEDURE — 87040 BLOOD CULTURE FOR BACTERIA: CPT

## 2019-03-04 PROCEDURE — 85610 PROTHROMBIN TIME: CPT

## 2019-03-04 PROCEDURE — 82962 GLUCOSE BLOOD TEST: CPT

## 2019-03-04 PROCEDURE — 84132 ASSAY OF SERUM POTASSIUM: CPT

## 2019-03-04 PROCEDURE — 83605 ASSAY OF LACTIC ACID: CPT

## 2019-03-04 PROCEDURE — 82330 ASSAY OF CALCIUM: CPT

## 2019-03-04 PROCEDURE — 87798 DETECT AGENT NOS DNA AMP: CPT

## 2019-03-04 PROCEDURE — 87086 URINE CULTURE/COLONY COUNT: CPT

## 2019-03-04 PROCEDURE — 99284 EMERGENCY DEPT VISIT MOD MDM: CPT | Mod: 25

## 2019-03-04 PROCEDURE — 85027 COMPLETE CBC AUTOMATED: CPT

## 2019-03-04 PROCEDURE — 82803 BLOOD GASES ANY COMBINATION: CPT

## 2019-03-04 PROCEDURE — 71045 X-RAY EXAM CHEST 1 VIEW: CPT

## 2019-03-04 PROCEDURE — 85730 THROMBOPLASTIN TIME PARTIAL: CPT

## 2019-03-04 PROCEDURE — 70450 CT HEAD/BRAIN W/O DYE: CPT

## 2019-03-04 PROCEDURE — 81001 URINALYSIS AUTO W/SCOPE: CPT

## 2019-03-04 PROCEDURE — 82947 ASSAY GLUCOSE BLOOD QUANT: CPT

## 2019-03-04 PROCEDURE — 82435 ASSAY OF BLOOD CHLORIDE: CPT

## 2019-03-04 PROCEDURE — 84295 ASSAY OF SERUM SODIUM: CPT

## 2019-03-04 PROCEDURE — 96365 THER/PROPH/DIAG IV INF INIT: CPT

## 2019-03-04 PROCEDURE — 87581 M.PNEUMON DNA AMP PROBE: CPT

## 2019-03-04 PROCEDURE — 80053 COMPREHEN METABOLIC PANEL: CPT

## 2019-03-04 RX ORDER — LOSARTAN POTASSIUM 100 MG/1
25 TABLET, FILM COATED ORAL DAILY
Qty: 0 | Refills: 0 | Status: DISCONTINUED | OUTPATIENT
Start: 2019-03-04 | End: 2019-03-07

## 2019-03-04 RX ORDER — DOXAZOSIN MESYLATE 4 MG
1 TABLET ORAL ONCE
Qty: 0 | Refills: 0 | Status: COMPLETED | OUTPATIENT
Start: 2019-03-04 | End: 2019-03-04

## 2019-03-04 RX ORDER — ISOSORBIDE MONONITRATE 60 MG/1
60 TABLET, EXTENDED RELEASE ORAL ONCE
Qty: 0 | Refills: 0 | Status: COMPLETED | OUTPATIENT
Start: 2019-03-04 | End: 2019-03-04

## 2019-03-04 RX ORDER — METOPROLOL TARTRATE 50 MG
25 TABLET ORAL ONCE
Qty: 0 | Refills: 0 | Status: DISCONTINUED | OUTPATIENT
Start: 2019-03-04 | End: 2019-03-04

## 2019-03-04 RX ADMIN — ISOSORBIDE MONONITRATE 60 MILLIGRAM(S): 60 TABLET, EXTENDED RELEASE ORAL at 05:15

## 2019-03-04 RX ADMIN — LOSARTAN POTASSIUM 25 MILLIGRAM(S): 100 TABLET, FILM COATED ORAL at 03:39

## 2019-03-04 RX ADMIN — Medication 1 MILLIGRAM(S): at 04:50

## 2019-03-04 NOTE — ED ADULT NURSE REASSESSMENT NOTE - NS ED NURSE REASSESS COMMENT FT1
Attempted to given patient isosorbide pill. Patient unable to swallow pill. MD Gamez notified, pill to be resent by pharmacy.

## 2019-03-04 NOTE — ED ADULT NURSE REASSESSMENT NOTE - NS ED NURSE REASSESS COMMENT FT1
[facility] present for transport to [facility]- pt cleared for discharge per Dr WIL Gamez

## 2019-03-04 NOTE — ED ADULT NURSE REASSESSMENT NOTE - NS ED NURSE REASSESS COMMENT FT1
[facility] present for transport to [facility] but unable to transport pt at present due to HTN- DR WIL Gamez and DR FAN [facility] and [facility] to receeve medication and they will return in 1 hr for pickup if BP improved

## 2019-03-08 LAB
CULTURE RESULTS: SIGNIFICANT CHANGE UP
CULTURE RESULTS: SIGNIFICANT CHANGE UP
SPECIMEN SOURCE: SIGNIFICANT CHANGE UP
SPECIMEN SOURCE: SIGNIFICANT CHANGE UP

## 2020-04-23 NOTE — ED ADULT TRIAGE NOTE - NS ED NURSE DIRECT TO ROOM YN
[place] 

Pouch











Matti Warren MD Apr 23, 2020 10:32 No

## 2020-10-21 NOTE — CONSULT NOTE ADULT - ASSESSMENT
85 F h/o TIA (baseline mild confusion), HTN, anemia, h/o non bleeding gastric ulcer (5/17 admission for UGIB) presented with dizziness, was found to have anemia.    #2 Anemia- Likely 2/2 retroperitoneal hematoma, other differential includes: PUD (h/o non bleeding gastric ulcer), erosive gastropathy (ASA, plavix use), AVMs, malignancy  -Recent EGD and colonoscopy revealing non bleeding gastric ulcer, will do a capsule study to further evaluate source of bleeding  -NPO MN for capsule endoscopy  -serial H/H, transfuse to hgb of 7  -hold off ASA and Plavix for now    Seen and examined by Sarah Sanders, PGY3.   Will d/w attending. 85 F h/o TIA (baseline mild confusion), HTN, anemia, h/o non bleeding gastric ulcer (5/17 admission for UGIB) presented with dizziness, was found to have anemia.    #2 Anemia- Likely 2/2 retroperitoneal hematoma, other differential includes: PUD (h/o non bleeding gastric ulcer), erosive gastropathy (ASA, plavix use), AVMs, malignancy  -NPO MN for EGD  -If EGD is non-revealing, will do a capsule endoscopy to further evaluate source of bleeding  - Serial H/H, transfuse to hgb of 7  - Hold off ASA and Plavix for now    Seen and examined by Sarah Sanders, PGY3.   Will d/w attending. Home

## 2021-08-24 NOTE — ED ADULT NURSE NOTE - BP NONINVASIVE SYSTOLIC (MM HG)
Patient Education     Acute Otitis Media with Infection (Child)    Your child has a middle ear infection (acute otitis media). It's caused by bacteria or viruses. The middle ear is the space behind the eardrum. The eustachian tube connects the ear to the nasal passage. The eustachian tubes help drain fluid from the ears. They also keep the air pressure equal inside and outside the ears. These tubes are shorter and more horizontal in children. This makes it more likely for the tubes to become blocked. A blockage lets fluid and pressure build up in the middle ear. Bacteria or fungi can grow in this fluid and cause an ear infection. This infection is commonly known as an earache.   The main symptom of an ear infection is ear pain. Other symptoms may include pulling at the ear, being more fussy than usual, fever, decreased appetite, and vomiting or diarrhea. Your child s hearing may also be affected. Your child may have had a respiratory infection first.   An ear infection may clear up on its own. Or your child may need to take medicine. After the infection goes away, your child may still have fluid in the middle ear. It may take weeks or months for this fluid to go away. During that time, your child may have temporary hearing loss. But all other symptoms of the earache should be gone.   Home care  Follow these guidelines when caring for your child at home:    The healthcare provider will likely prescribe medicines for pain. The provider may also prescribe antibiotics to treat the infection. These may be liquid medicines to give by mouth. Or they may be ear drops. Follow the provider s instructions for giving these medicines to your child.  Don't give your child any other medicine without first asking your child's healthcare provider, especially the first time.    Because ear infections can clear up on their own, the provider may suggest waiting for a few days before giving your child medicines for infection.    To  reduce pain, have your child rest in an upright position. Hot or cold compresses held against the ear may help ease pain.    Don't smoke in the house or around your child. Keep your child away from secondhand smoke.  To help prevent future infections:    Don't smoke near your child. Secondhand smoke raises the risk for ear infections in children.    Make sure your child gets all appropriate vaccines.    Don't bottle-feed while your baby is lying on his or her back. (This position can cause middle ear infections because it allows milk to run into the eustachian tubes.)        If you breastfeed, continue until your child is 6 to 12 months of age.  To apply ear drops:  1. Put the bottle in warm water if the medicine is kept in the refrigerator. Cold drops in the ear are uncomfortable.  2. Have your child lie down on a flat surface. Gently hold your child s head to one side.  3. Remove any drainage from the ear with a clean tissue or cotton swab. Clean only the outer ear. Don t put the cotton swab into the ear canal.  4. Straighten the ear canal by gently pulling the earlobe up and back.  5. Keep the dropper a half-inch above the ear canal. This will keep the dropper from becoming contaminated. Put the drops against the side of the ear canal.  6. Have your child stay lying down for 2 to 3 minutes. This gives time for the medicine to enter the ear canal. If your child doesn t have pain, gently massage the outer ear near the opening.  7. Wipe any extra medicine away from the outer ear with a clean cotton ball.    Follow-up care  Follow up with your child s healthcare provider as directed. Your child will need to have the ear rechecked to make sure the infection has gone away. Check with the healthcare provider to see when they want to see your child.   Special note to parents  If your child continues to get earaches, he or she may need ear tubes. The provider will put small tubes in your child s eardrum to help keep fluid  from building up. This procedure is a simple and works well.   When to seek medical advice  Call your child's healthcare provider for any of the following:     Fever (see Fever and children, below)    New symptoms, especially swelling around the ear or weakness of face muscles    Severe pain    Infection seems to get worse, not better     Neck pain    Your child acts very sick or not himself or herself    Fever or pain don't improve with antibiotics after 48 hours  Fever and children  Use a digital thermometer to check your child s temperature. Don t use a mercury thermometer. There are different kinds and uses of digital thermometers. They include:     Rectal. For children younger than 3 years, a rectal temperature is the most accurate.    Forehead (temporal). This works for children age 3 months and older. If a child under 3 months old has signs of illness, this can be used for a first pass. The provider may want to confirm with a rectal temperature.    Ear (tympanic). Ear temperatures are accurate after 6 months of age, but not before.    Armpit (axillary). This is the least reliable but may be used for a first pass to check a child of any age with signs of illness. The provider may want to confirm with a rectal temperature.    Mouth (oral). Don t use a thermometer in your child s mouth until he or she is at least 4 years old.  Use the rectal thermometer with care. Follow the product maker s directions for correct use. Insert it gently. Label it and make sure it s not used in the mouth. It may pass on germs from the stool. If you don t feel OK using a rectal thermometer, ask the healthcare provider what type to use instead. When you talk with any healthcare provider about your child s fever, tell him or her which type you used.   Below are guidelines to know if your young child has a fever. Your child s healthcare provider may give you different numbers for your child. Follow your provider s specific  instructions.   Fever readings for a baby under 3 months old:     First, ask your child s healthcare provider how you should take the temperature.    Rectal or forehead: 100.4 F (38 C) or higher    Armpit: 99 F (37.2 C) or higher  Fever readings for a child age 3 months to 36 months (3 years):     Rectal, forehead, or ear: 102 F (38.9 C) or higher    Armpit: 101 F (38.3 C) or higher  Call the healthcare provider in these cases:     Repeated temperature of 104 F (40 C) or higher in a child of any age    Fever of 100.4  F (38  C) or higher in baby younger than 3 months    Fever that lasts more than 24 hours in a child under age 2    Fever that lasts for 3 days in a child age 2 or older    [employer] last reviewed this educational content on 4/1/2020 2000-2021 [employer]. All rights reserved. This information is not intended as a substitute for professional medical care. Always follow your healthcare professional's instructions.            168

## 2021-10-13 NOTE — PHYSICAL THERAPY INITIAL EVALUATION ADULT - PATIENT/FAMILY/SIGNIFICANT OTHER GOALS STATEMENT, PT EVAL
Pulmonary Telephone Visit    Primary Care MD: Renata Scott MD     Primary Pulmonologist:  Arben Gutierrez MD    Reason for Follow up:  No chief complaint on file.      Verbal Consent Obtained for Phone Call Billing: yes    Verbal Consent Obtained for Phone Call Visit: yes    He is an established patient of mine or is a patient of one of my clinic partners who is currently unavailable.  He is in [place] and his identity has been established.   David understands that we are limiting office visits due to the coronavirus pandemic and he consents to a virtual visit with charges submitted to his insurance.     HISTORY OF PRESENT ILLNESS/SUBJECTIVE     HISTORY OF PRESENT ILLNESS/SUBJECTIVE:    Patient is a 82 year old with vasomotor rhinitis partially controlled with Zyrtec and Atrovent NS.  Symptoms worsened by eating.  Also has moderate COPD and is on Symbicort 80/4.5 and PRN Albuterol.  Has sleep maintenance insomnia and has been on Ambien.  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    PAST MEDICAL, FAMILY AND SOCIAL HISTORY     Medical, family, and social history reviewed with patient via the telephone today and unchanged.    Medications:  Personally Reviewed.  Current Outpatient Medications   Medication Sig Dispense Refill   • albuterol 108 (90 Base) MCG/ACT inhaler Inhale 2 puffs into the lungs every 4 hours as needed for Shortness of Breath or Wheezing (wheezing). 1 each 11   • budesonide-formoterol (Symbicort) 160-4.5 MCG/ACT inhaler Inhale 2 puffs into the lungs 2 times daily. 1 each 11   • zolpidem (AMBIEN) 10 MG tablet TAKE ONE TO ONE AND ONE-HALF TABLET BY MOUTH EVERY EVENING AS NEEDED FOR INSOMNIA 45 tablet 4   • Cholecalciferol (vitamin D) 50 mcg (2,000 units) capsule Take 1 capsule by mouth daily. 90 capsule 1   • lisinopril-hydroCHLOROthiazide (ZESTORETIC) 20-25 MG per tablet Take 1 tablet by mouth daily. 90 tablet 1   • traZODone (DESYREL) 50 MG tablet Take 1 tablet by  mouth nightly. 90 tablet 1   • ipratropium (ATROVENT) 0.06 % nasal spray Spray 2 sprays in each nostril 4 times daily. 15 mL 11   • traZODone (DESYREL) 50 MG tablet Take 1 tablet by mouth nightly. 30 tablet 5   • Carboxymethylcellul-Glycerin (REFRESH OPTIVE) 1-0.9 % Gel      • Multiple Vitamin tablet Take 1 tablet by mouth daily.     • Artificial Tear Solution (GENTEAL TEARS) 0.1-0.3 % Solution Use as directed     • acetaminophen (TYLENOL) 500 MG tablet Take 1 tablet by mouth every 6 hours as needed for Pain. 30 tablet 0   • ASPIRIN 81 MG PO TABS 1 tablet daily OTC 0     No current facility-administered medications for this visit.       Allergies:  Personally Reviewed.  ALLERGIES:   Allergen Reactions   • Penicillins SWELLING     edema       Smoking: Personally Reviewed.  Social History     Tobacco Use   Smoking Status Former Smoker   • Packs/day: 0.50   • Years: 33.00   • Pack years: 16.50   • Quit date: 2000   • Years since quittin.7   Smokeless Tobacco Never Used        REVIEW OF SYSTEMS     Review of Systems:   A 10 point comprehensive review of systems was obtained.  Positive noted in history of present illness, all others are reviewed and are negative.    Patient supplied Vital Signs: none        IMAGING     IMAGING:  Imaging studies personally reviewed by me. Official radiology findings and interpretation is as follows: NA    DATABASE     Compliance Data  DME Company: [employer]     LABORATORY     WBC (K/mcL)   Date Value   2021 6.6     RBC (mil/mcL)   Date Value   2021 4.74     HCT (%)   Date Value   2021 42.5     HGB (g/dL)   Date Value   2021 14.3     PLT (K/mcL)   Date Value   2021 226        Sodium (mmol/L)   Date Value   2021 140     Potassium (mmol/L)   Date Value   2021 3.6     Chloride (mmol/L)   Date Value   2021 106     Glucose (mg/dL)   Date Value   2021 116 (H)     Calcium (mg/dL)   Date Value   2021 10.1     Carbon Dioxide (mmol/L)    Date Value   02/26/2021 29     BUN (mg/dL)   Date Value   02/26/2021 11     Creatinine (mg/dL)   Date Value   02/26/2021 0.70        No results found for: [facility]     ASSESSMENT/PLAN     Medical Issues Discussed: vasomotor rhinitis, COPD, GWEN    Pertinent Findings: no significant changes in status.    Assessment or diagnosis, if new: none    Any medication adjustments or refills: refills provided for Symbicort and Albuterol    Labs ordered: none    Follow-up recommendations/plan of treatment, if new: RTC in 6 months    No follow-ups on file.     Call with any questions or worsening of symptoms.  Patient verbalizes understanding and agreement with plan.  All questions answered.    Arben Gutierrez MD    Total time spent during phone call: 15 minutes        Pt's desire is to return home with increase functional activity.

## 2021-11-23 NOTE — PRE-OP CHECKLIST - BSA (M2)
1.61 [FreeTextEntry1] : the lab report including TSH, A1C, CMP, lipid panel, the endocrinology and ENT notes were reviewed, he may stay at the 25 mg dose of losartan, flu vaccine given, carotid artery duplex ordered

## 2021-12-13 NOTE — H&P ADULT. - PROBLEM SELECTOR PROBLEM 4
Rituxan Counseling:  I discussed with the patient the risks of Rituxan infusions. Side effects can include infusion reactions, severe drug rashes including mucocutaneous reactions, reactivation of latent hepatitis and other infections and rarely progressive multifocal leukoencephalopathy.  All of the patient's questions and concerns were addressed. TIA (transient ischemic attack)

## 2022-10-01 NOTE — ED ADULT NURSE NOTE - NS TRANSFER PATIENT BELONGINGS
Report called to floor RN now. Pt is alert and oriented x 4 with daughter at bedside. Pt may go off unit without tele monitor. Pt will transport without tele. Clothing

## 2022-10-17 NOTE — ED ADULT TRIAGE NOTE - BP NONINVASIVE SYSTOLIC (MM HG)
155 Epidermal Autograft Text: The defect edges were debeveled with a #15 scalpel blade.  Given the location of the defect, shape of the defect and the proximity to free margins an epidermal autograft was deemed most appropriate.  Using a sterile surgical marker, the primary defect shape was transferred to the donor site. The epidermal graft was then harvested.  The skin graft was then placed in the primary defect and oriented appropriately.

## 2023-04-21 NOTE — PROVIDER CONTACT NOTE (CRITICAL VALUE NOTIFICATION) - ASSESSMENT
[facility] - Emergency Dept.  Cardiology  Consult Note    Patient Name: Laron Kothari Jr.  MRN: [id number]  Admission Date: 4/20/2023  Hospital Length of Stay: 0 days  Code Status: Prior   Attending Provider: Yomi Hurst MD   Consulting Provider: Laura Oliveros PA-C  Primary Care Physician: Kristen Adler DO  Principal Problem:Chest pain    Patient information was obtained from patient, past medical records and ER records.     Inpatient consult to Cardiology  Consult performed by: Laura Oliveros PA-C  Consult ordered by: Gordon Cortes MD        Subjective:     Chief Complaint:  Chest pain     HPI:   Mr. Kothari is a 51 year old male patient whose current medical conditions include hyperlipidemia, HTN, DM type II, and CAD s/p recent NSTEMI with RCA stent placement on 4/11/23 (also has distal LCX and LAD disease, medical mgmt) who presented to [facility] ED yesterday with a chief complaint of substernal sharp chest discomfort that occurred yesterday while at rest. Pain lasted a few seconds and then spontaneously resolved but recurred again approximately 20-30 minutes later, concerning patient and prompting him to go to the ED. He denied any associated SOB, diaphoresis, palpitations, near syncope, or syncope. Initial workup in ED revealed flat troponin, 0.094 x 2 and patient was subsequently admitted for further evaluation and treatment. Cardiology consulted to assist with management. Patient seen and examined today, resting in bed. Feels well. Does report some mild chest soreness with movement. States chest pain was different in nature from his MI pain earlier this month. He reports compliance with his medications. Followed in cardiology clinic. Chart reviewed. EKG without acute ischemic changes.           Past Medical History:   Diagnosis Date    Blood in stool 08/07/2018    Deep vein thrombosis (DVT) 2017    Left leg    Diabetes mellitus, type 2 2013    Gout     Hyperlipidemia     Hypertension     Neuropathy      Smoker        Past Surgical History:   Procedure Laterality Date    APPENDECTOMY      ARTHROSCOPIC CHONDROPLASTY OF KNEE JOINT Right 11/24/2020    Procedure: ARTHROSCOPY, KNEE, WITH CHONDROPLASTY;  Surgeon: Nahum Rose MD;  Location: [facility] OR;  Service: Orthopedics;  Laterality: Right;  chondroplasty medial condyle and anteriorly    COLONOSCOPY N/A 8/7/2018    Procedure: COLONOSCOPY;  Surgeon: Ten Valentine MD;  Location: [facility] ENDO;  Service: Endoscopy;  Laterality: N/A;    COLONOSCOPY N/A 2/23/2022    Procedure: COLONOSCOPY;  Surgeon: Octavio Craig MD;  Location: [facility] ENDO;  Service: Endoscopy;  Laterality: N/A;    KNEE ARTHROSCOPY W/ MENISCECTOMY Right 11/24/2020    Procedure: ARTHROSCOPY, KNEE, WITH MENISCECTOMY;  Surgeon: Nahum Rose MD;  Location: [facility] OR;  Service: Orthopedics;  Laterality: Right;  Partial medial and lateral meniscectomy    LEFT HEART CATHETERIZATION Left 4/11/2023    Procedure: Left heart cath;  Surgeon: Sue Lara MD;  Location: [facility] CATH LAB;  Service: Cardiology;  Laterality: Left;    PERCUTANEOUS TRANSLUMINAL BALLOON ANGIOPLASTY OF CORONARY ARTERY  4/11/2023    Procedure: Angioplasty-coronary;  Surgeon: Sue Lara MD;  Location: [facility] CATH LAB;  Service: Cardiology;;    ROBOT-ASSISTED CHOLECYSTECTOMY USING DA TRIPP XI N/A 1/21/2020    Procedure: XI ROBOTIC CHOLECYSTECTOMY;  Surgeon: Sebastien Rivera MD;  Location: [facility] OR;  Service: General;  Laterality: N/A;    STENT, DRUG ELUTING, SINGLE VESSEL, CORONARY  4/11/2023    Procedure: Stent, Drug Eluting, Single Vessel, Coronary;  Surgeon: Sue Lara MD;  Location: [facility] CATH LAB;  Service: Cardiology;;    TONSILLECTOMY, ADENOIDECTOMY         Review of patient's allergies indicates:  No Known Allergies    No current facility-administered medications on file prior to encounter.     Current Outpatient Medications on File Prior to Encounter   Medication Sig    aspirin (ECOTRIN) 81 MG EC  "tablet Take 1 tablet (81 mg total) by mouth once daily.    blood sugar diagnostic Strp Once daily glucose testing.    blood-glucose meter Misc Use as directed.    empagliflozin (JARDIANCE) 10 mg tablet Take 1 tablet (10 mg total) by mouth once daily.    fluticasone propionate (FLONASE) 50 mcg/actuation nasal spray 1 spray (50 mcg total) by Each Nostril route once daily. (Patient taking differently: 1 spray by Each Nostril route daily as needed.)    gabapentin (NEURONTIN) 300 MG capsule Take 1 capsule (300 mg total) by mouth 3 (three) times daily.    glipiZIDE (GLUCOTROL) 5 MG tablet Take 1 tablet (5 mg total) by mouth once daily.    lancets (LANCETS,THIN) Misc Once daily glucose testing.    lisinopriL (PRINIVIL,ZESTRIL) 40 MG tablet Take 1 tablet (40 mg total) by mouth once daily.    metFORMIN (GLUCOPHAGE) 1000 MG tablet Take 1 tablet (1,000 mg total) by mouth 2 (two) times daily with meals.    metoprolol succinate (TOPROL-XL) 50 MG 24 hr tablet Take 1 tablet (50 mg total) by mouth once daily.    pen needle, diabetic (PEN NEEDLE) 31 gauge x 5/16" Ndle Use once daily with insulin injection.    pravastatin (PRAVACHOL) 80 MG tablet Take 1 tablet (80 mg total) by mouth every evening.    ranolazine (RANEXA) 500 MG Tb12 Take 1 tablet (500 mg total) by mouth 2 (two) times daily.    sildenafiL (VIAGRA) 50 MG tablet Take 1 tablet (50 mg total) by mouth daily as needed for Erectile Dysfunction.    ticagrelor (BRILINTA) 90 mg tablet Take 1 tablet (90 mg total) by mouth 2 (two) times daily.     Family History       Problem Relation (Age of Onset)    Cataracts Father    Diabetes Father    Hypertension Mother, Brother    Prostate cancer Father          Tobacco Use    Smoking status: Every Day     Packs/day: 0.25     Years: 36.00     Pack years: 9.00     Types: Cigarettes    Smokeless tobacco: Never    Tobacco comments:     no smoking after m.n prior to sx   Substance and Sexual Activity    Alcohol use: Yes     " Alcohol/week: 26.0 standard drinks     Types: 26 Cans of beer per week     Comment: daily    Drug use: No    Sexual activity: Yes     Partners: Female     Review of Systems   Constitutional: Negative.   HENT: Negative.     Eyes: Negative.    Cardiovascular:  Positive for chest pain (atypical;).   Respiratory: Negative.     Endocrine: Negative.    Hematologic/Lymphatic: Negative.    Skin: Negative.    Musculoskeletal: Negative.    Gastrointestinal: Negative.    Genitourinary: Negative.    Neurological: Negative.    Psychiatric/Behavioral: Negative.     Allergic/Immunologic: Negative.    Objective:     Vital Signs (Most Recent):  Temp: 98.1 °F (36.7 °C) (04/20/23 1817)  Pulse: 70 (04/21/23 0130)  Resp: 19 (04/21/23 0130)  BP: 110/67 (04/21/23 0000)  SpO2: 99 % (04/21/23 0130) Vital Signs (24h Range):  Temp:  [98.1 °F (36.7 °C)] 98.1 °F (36.7 °C)  Pulse:  [64-94] 70  Resp:  [11-19] 19  SpO2:  [99 %-100 %] 99 %  BP: (109-168)/() 110/67     Weight: 104.8 kg (231 lb)  Body mass index is 29.66 kg/m².    SpO2: 99 %       No intake or output data in the 24 hours ending 04/21/23 0942    Lines/Drains/Airways       Peripheral Intravenous Line  Duration                  Peripheral IV - Single Lumen 04/20/23 20 G Right Antecubital 1 day                    Physical Exam  Vitals and nursing note reviewed.   Constitutional:       General: He is not in acute distress.     Appearance: Normal appearance. He is well-developed. He is not diaphoretic.   HENT:      Head: Normocephalic and atraumatic.   Eyes:      General:         Right eye: No discharge.         Left eye: No discharge.      Pupils: Pupils are equal, round, and reactive to light.   Neck:      Thyroid: No thyromegaly.      Vascular: No JVD.      Trachea: No tracheal deviation.   Cardiovascular:      Rate and Rhythm: Normal rate and regular rhythm.      Heart sounds: Normal heart sounds, S1 normal and S2 normal. No murmur heard.  Pulmonary:      Effort: Pulmonary  effort is normal. No respiratory distress.      Breath sounds: Normal breath sounds. No wheezing or rales.   Abdominal:      General: There is no distension.      Palpations: Abdomen is soft.      Tenderness: There is no rebound.   Musculoskeletal:      Cervical back: Neck supple.      Right lower leg: No edema.      Left lower leg: No edema.   Skin:     General: Skin is warm and dry.      Findings: No erythema.   Neurological:      Mental Status: He is alert and oriented to person, place, and time.   Psychiatric:         Mood and Affect: Mood normal.         Behavior: Behavior normal.         Thought Content: Thought content normal.       Significant Labs: CMP   Recent Labs   Lab 04/20/23 1847      K 4.3      CO2 24   *   BUN 13   CREATININE 1.2   CALCIUM 9.8   PROT 7.9   ALBUMIN 3.7   BILITOT 0.3   ALKPHOS 69   AST 13   ALT 20   ANIONGAP 11   , CBC   Recent Labs   Lab 04/20/23 1847   WBC 12.14   HGB 12.4*   HCT 38.1*      , INR   Recent Labs   Lab 04/20/23 1847   INR 1.0   , Troponin   Recent Labs   Lab 04/20/23 1847 04/21/23  0132   TROPONINI 0.094* 0.094*   , and All pertinent lab results from the last 24 hours have been reviewed.    Significant Imaging: Echocardiogram: Transthoracic echo (TTE) complete (Cupid Only):   Results for orders placed or performed during the hospital encounter of 04/11/23   Echo   Result Value Ref Range    BSA 2.47 m2    TDI SEPTAL 0.12 m/s    LV LATERAL E/E' RATIO 7.70 m/s    LV SEPTAL E/E' RATIO 6.42 m/s    LA WIDTH 3.40 cm    IVC diameter 1.15 cm    Left Ventricular Outflow Tract Mean Velocity 0.67 cm/s    Left Ventricular Outflow Tract Mean Gradient 2.05 mmHg    TDI LATERAL 0.10 m/s    LVIDd 4.21 3.5 - 6.0 cm    IVS 1.60 (A) 0.6 - 1.1 cm    Posterior Wall 1.30 (A) 0.6 - 1.1 cm    Ao root annulus 4.01 cm    LVIDs 3.05 2.1 - 4.0 cm    FS 28 28 - 44 %    LA volume 39.92 cm3    STJ 3.98 cm    Ascending aorta 3.94 cm    LV mass 237.56 g    LA size 2.66 cm     TAPSE 1.90 cm    Left Ventricle Relative Wall Thickness 0.62 cm    AV mean gradient 3 mmHg    AV valve area 4.23 cm2    AV Velocity Ratio 0.90     AV index (prosthetic) 0.93     MV valve area p 1/2 method 2.07 cm2    E/A ratio 0.79     Mean e' 0.11 m/s    E wave deceleration time 366.29 msec    IVRT 83.73 msec    Pulm vein S/D ratio 1.07     LVOT diameter 2.40 cm    LVOT area 4.5 cm2    LVOT peak jorge 0.96 m/s    LVOT peak VTI 20.00 cm    Ao peak jorge 1.07 m/s    Ao VTI 21.4 cm    RVOT peak jorge 0.66 m/s    RVOT peak VTI 14.1 cm    LVOT stroke volume 90.43 cm3    AV peak gradient 5 mmHg    PV mean gradient 0.91 mmHg    E/E' ratio 7.00 m/s    MV Peak E Jorge 0.77 m/s    TR Max Jorge 1.96 m/s    MV stenosis pressure 1/2 time 106.22 ms    MV Peak A Jorge 0.98 m/s    PV Peak S Jorge 0.44 m/s    PV Peak D Jorge 0.41 m/s    LV Systolic Volume 36.42 mL    LV Systolic Volume Index 15.0 mL/m2    LV Diastolic Volume 78.87 mL    LV Diastolic Volume Index 32.59 mL/m2    LA Volume Index 16.5 mL/m2    LV Mass Index 98 g/m2    RA Major Axis 3.97 cm    Left Atrium Minor Axis 5.08 cm    Left Atrium Major Axis 5.31 cm    Triscuspid Valve Regurgitation Peak Gradient 15 mmHg    Right Atrial Pressure (from IVC) 3 mmHg    EF 55 %    TV rest pulmonary artery pressure 18 mmHg    Narrative    · The left ventricle is normal in size with concentric remodeling and   normal systolic function.  · Normal left ventricular diastolic function.  · The estimated PA systolic pressure is 18 mmHg.  · Normal right ventricular size with normal right ventricular systolic   function.  · Normal central venous pressure (3 mmHg).  · The estimated ejection fraction is 55%.  · The ascending aorta is mildly dilated.  · There are segmental left ventricular wall motion abnormalities.      , EKG: Reviewed, and X-Ray: CXR: X-Ray Chest 1 View (CXR): No results found for this visit on 04/20/23. and X-Ray Chest PA and Lateral (CXR): No results found for this visit on  04/20/23.    Assessment and Plan:   Patient with CAD/recent NSTEMI with stent placement who presents with atypical CP. Trop flat. Symptoms atypical, different from MI pain. Continue home regimen. Will discuss anti-inflammatory with MD.     * Chest pain  -Presents with atypical CP, some soreness with movement during exam  -NOT similar to his prior MI pain  -Troponin flat  -EKG without acute ischemic changes  -Continue ASA, Brilinta, Ranexa, BB, ACEi  -Will discuss anti-inflammatory with MD  -Follow-up in clinic    Coronary artery disease of native artery of native heart with stable angina pectoris  -Continue current meds/mgmt-DAPT, BB, ACEi, statin, Ranexa    Type 2 diabetes mellitus with diabetic neuropathy  -Mgmt as per hospital medicine    Hypertension goal BP (blood pressure) < 130/80  -Continue home meds-BB, ACEi    Tobacco use disorder  -Cessation    Hyperlipidemia LDL goal <70  -Statin        VTE Risk Mitigation (From admission, onward)         Ordered     Place sequential compression device  Until discontinued         04/21/23 0107                Thank you for your consult. I will follow-up with patient. Please contact us if you have any additional questions.    Laura Oliveros PA-C  Cardiology   [facility] - Emergency Dept.     Patient Axox2, disoriented and confused at times, reoriented as needed. Safety maintained, voids freely, incontinent at times, on IV antibiotics as ordered. skin integrity maintained, will continue to monitor

## 2024-09-05 NOTE — H&P ADULT - NSHPOUTPATIENTPROVIDERS_GEN_ALL_CORE
Problem: Discharge Planning  Goal: Discharge to home or other facility with appropriate resources  Outcome: Progressing     Problem: Safety - Adult  Goal: Free from fall injury  Outcome: Progressing     Problem: Skin/Tissue Integrity - Adult  Goal: Skin integrity remains intact  Outcome: Progressing     Problem: Metabolic/Fluid and Electrolytes - Adult  Goal: Electrolytes maintained within normal limits  Outcome: Progressing  Goal: Hemodynamic stability and optimal renal function maintained  Outcome: Progressing  Goal: Glucose maintained within prescribed range  Outcome: Progressing     Problem: Chronic Conditions and Co-morbidities  Goal: Patient's chronic conditions and co-morbidity symptoms are monitored and maintained or improved  Outcome: Progressing      [facility]

## 2024-10-18 NOTE — PATIENT PROFILE ADULT. - CENTRAL VENOUS CATHETER
Patient is agreeable to pap therapy, order has been sent to [facility] per pt request 10/18/24 AB  
no

## 2025-04-01 NOTE — ED ADULT TRIAGE NOTE - SPO2 (%)
Additional Notes: I am going to treat tinea, and then have patient follow up in three to four weeks. I do not want him to use his clobetasol at all during this time. I will plan to refill clobetasol at follow up if he needs it, but today his main problem is tinea. Detail Level: Simple Render Risk Assessment In Note?: no 100